# Patient Record
Sex: FEMALE | Race: BLACK OR AFRICAN AMERICAN | NOT HISPANIC OR LATINO | Employment: FULL TIME | URBAN - METROPOLITAN AREA
[De-identification: names, ages, dates, MRNs, and addresses within clinical notes are randomized per-mention and may not be internally consistent; named-entity substitution may affect disease eponyms.]

---

## 2017-01-18 ENCOUNTER — OFFICE VISIT - HEALTHEAST (OUTPATIENT)
Dept: INTERNAL MEDICINE | Facility: CLINIC | Age: 32
End: 2017-01-18

## 2017-01-18 ENCOUNTER — COMMUNICATION - HEALTHEAST (OUTPATIENT)
Dept: TELEHEALTH | Facility: CLINIC | Age: 32
End: 2017-01-18

## 2017-01-18 DIAGNOSIS — K21.9 GERD (GASTROESOPHAGEAL REFLUX DISEASE): ICD-10-CM

## 2017-01-18 DIAGNOSIS — F43.21 GRIEF REACTION: ICD-10-CM

## 2017-01-18 DIAGNOSIS — R51.9 HEADACHE: ICD-10-CM

## 2017-01-18 DIAGNOSIS — G47.00 INSOMNIA: ICD-10-CM

## 2017-02-01 ENCOUNTER — COMMUNICATION - HEALTHEAST (OUTPATIENT)
Dept: FAMILY MEDICINE | Facility: CLINIC | Age: 32
End: 2017-02-01

## 2017-02-15 ENCOUNTER — OFFICE VISIT - HEALTHEAST (OUTPATIENT)
Dept: INTERNAL MEDICINE | Facility: CLINIC | Age: 32
End: 2017-02-15

## 2017-02-15 DIAGNOSIS — F32.9 REACTIVE DEPRESSION (SITUATIONAL): ICD-10-CM

## 2017-02-15 DIAGNOSIS — F43.21 GRIEF REACTION: ICD-10-CM

## 2017-02-15 DIAGNOSIS — G44.209 TENSION HEADACHE: ICD-10-CM

## 2017-02-15 ASSESSMENT — MIFFLIN-ST. JEOR: SCORE: 1264.5

## 2017-08-12 ENCOUNTER — HEALTH MAINTENANCE LETTER (OUTPATIENT)
Age: 32
End: 2017-08-12

## 2018-03-09 ENCOUNTER — AMBULATORY - HEALTHEAST (OUTPATIENT)
Dept: MULTI SPECIALTY CLINIC | Facility: CLINIC | Age: 33
End: 2018-03-09

## 2018-03-09 ENCOUNTER — OFFICE VISIT (OUTPATIENT)
Dept: FAMILY MEDICINE | Facility: CLINIC | Age: 33
End: 2018-03-09
Payer: COMMERCIAL

## 2018-03-09 VITALS
BODY MASS INDEX: 23.39 KG/M2 | SYSTOLIC BLOOD PRESSURE: 104 MMHG | TEMPERATURE: 98 F | WEIGHT: 137 LBS | HEIGHT: 64 IN | HEART RATE: 86 BPM | DIASTOLIC BLOOD PRESSURE: 59 MMHG

## 2018-03-09 DIAGNOSIS — Z00.00 ROUTINE GENERAL MEDICAL EXAMINATION AT A HEALTH CARE FACILITY: Primary | ICD-10-CM

## 2018-03-09 DIAGNOSIS — H53.9 VISION CHANGES: ICD-10-CM

## 2018-03-09 DIAGNOSIS — Z12.4 SCREENING FOR MALIGNANT NEOPLASM OF CERVIX: ICD-10-CM

## 2018-03-09 LAB
HPV_EXT - HISTORICAL: NORMAL
PAP SMEAR - HIM PATIENT REPORTED: NORMAL

## 2018-03-09 PROCEDURE — G0145 SCR C/V CYTO,THINLAYER,RESCR: HCPCS | Performed by: FAMILY MEDICINE

## 2018-03-09 PROCEDURE — 87624 HPV HI-RISK TYP POOLED RSLT: CPT | Performed by: FAMILY MEDICINE

## 2018-03-09 PROCEDURE — 99385 PREV VISIT NEW AGE 18-39: CPT | Performed by: FAMILY MEDICINE

## 2018-03-09 ASSESSMENT — PATIENT HEALTH QUESTIONNAIRE - PHQ9
SUM OF ALL RESPONSES TO PHQ QUESTIONS 1-9: 6
10. IF YOU CHECKED OFF ANY PROBLEMS, HOW DIFFICULT HAVE THESE PROBLEMS MADE IT FOR YOU TO DO YOUR WORK, TAKE CARE OF THINGS AT HOME, OR GET ALONG WITH OTHER PEOPLE: NOT DIFFICULT AT ALL
SUM OF ALL RESPONSES TO PHQ QUESTIONS 1-9: 6

## 2018-03-09 NOTE — LETTER
[address] [id number] [phone]      March 18, 2019      Xiao Beal  [address] [facility] [address]          Dear Xiao Beal:    This is to remind you that your provider wanted you to return to the clinic for lab testing.    If you are coming in for Lipids and/or Glucose testing please fast for 10-12 hours. Morning medications can be taken with water.    You may call our office at [phone] to schedule an appointment.    Please disregard this notice if you have already had your labs drawn or made an            appointment.        Sincerely,    Nica Tse MD

## 2018-03-09 NOTE — MR AVS SNAPSHOT
After Visit Summary   3/9/2018    Xiao Beal    MRN: [id number]           Patient Information     Date Of Birth          1985        Visit Information        Provider Department      3/9/2018 11:00 AM Mariola Alvarado MD [facility]        Today's Diagnoses     Routine general medical examination at a health care facility    -  1    Screening for malignant neoplasm of cervix        Vision changes          Care Instructions      Preventive Health Recommendations  Female Ages 26 - 39  Yearly exam:   See your health care provider every year in order to    Review health changes.     Discuss preventive care.      Review your medicines if you your doctor has prescribed any.    Until age 30: Get a Pap test every three years (more often if you have had an abnormal result).    After age 30: Talk to your doctor about whether you should have a Pap test every 3 years or have a Pap test with HPV screening every 5 years.   You do not need a Pap test if your uterus was removed (hysterectomy) and you have not had cancer.  You should be tested each year for STDs (sexually transmitted diseases), if you're at risk.   Talk to your provider about how often to have your cholesterol checked.  If you are at risk for diabetes, you should have a diabetes test (fasting glucose).  Shots: Get a flu shot each year. Get a tetanus shot every 10 years.   Nutrition:     Eat at least 5 servings of fruits and vegetables each day.    Eat whole-grain bread, whole-wheat pasta and brown rice instead of white grains and rice.    Talk to your provider about Calcium and Vitamin D.     Lifestyle    Exercise at least 150 minutes a week (30 minutes a day, 5 days of the week). This will help you control your weight and prevent disease.    Limit alcohol to one drink per day.    No smoking.     Wear sunscreen to prevent skin cancer.    See your dentist every six months for an exam and cleaning.            Follow-ups  after your visit        Additional Services     OPTOMETRY REFERRAL       Your provider has referred you to: [facility]: [facility] [phone]    [email or web address]    Please be aware that coverage of these services is subject to the terms and limitations of your health insurance plan.  Call member services at your health plan with any benefit or coverage questions.      Please bring the following with you to your appointment:    (1) Any X-Rays, CTs or MRIs which have been performed.  Contact the facility where they were done to arrange for  prior to your scheduled appointment.    (2) List of current medications  (3) This referral request   (4) Any documents/labs given to you for this referral                  Future tests that were ordered for you today     Open Future Orders        Priority Expected Expires Ordered    **Glucose FUTURE anytime Routine 3/9/2018 3/9/2019 3/9/2018    Lipid panel reflex to direct LDL Fasting Routine 3/9/2018 3/9/2019 3/9/2018            Who to contact     If you have questions or need follow up information about today's clinic visit or your schedule please contact [facility] directly at [phone].  Normal or non-critical lab and imaging results will be communicated to you by MyChart, letter or phone within 4 business days after the clinic has received the results. If you do not hear from us within 7 days, please contact the clinic through MyChart or phone. If you have a critical or abnormal lab result, we will notify you by phone as soon as possible.  Submit refill requests through Lyft or call your pharmacy and they will forward the refill request to us. Please allow 3 business days for your refill to be completed.          Additional Information About Your Visit        BioserieharKontest Information     Lyft lets you send messages to your doctor, view your test results, renew your prescriptions, schedule appointments  "and more. To sign up, go to [email or web address] . Click on \"Log in\" on the left side of the screen, which will take you to the Welcome page. Then click on \"Sign up Now\" on the right side of the page.     You will be asked to enter the access code listed below, as well as some personal information. Please follow the directions to create your username and password.     Your access code is: [email or web address]  Expires: 4/15/2018 10:32 AM     Your access code will  in 90 days. If you need help or a new code, please call your [facility] clinic or [phone].        Care EveryWhere ID     This is your Care EveryWhere ID. This could be used by other organizations to access your [facility] medical records  [id number]        Your Vitals Were     Pulse Temperature Height Last Period Breastfeeding? BMI (Body Mass Index)    86 98  F (36.7  C) (Oral) 5' 4\" (1.626 m) 2018 (Approximate) No 23.52 kg/m2       Blood Pressure from Last 3 Encounters:   18 104/59   11/01/10 103/62   06 114/74    Weight from Last 3 Encounters:   18 137 lb (62.1 kg)   11/01/10 120 lb (54.4 kg)   06 131 lb 8 oz (59.6 kg)              We Performed the Following     HPV High Risk Types DNA Cervical     OPTOMETRY REFERRAL     Pap imaged thin layer screen with HPV - recommended age 30 - 65 years (select HPV order below)        Primary Care Provider Office Phone # Fax #    Richard Buchanan [phone]       [facility] [address] [place] [address]        Equal Access to Services     [facility]: Cecilia Bird, víctor peterson, cony steinberg. So [facility] [phone].    ATENCIÓN: Si habla español, tiene a david disposición servicios gratuitos de asistencia lingüística. Llame al [phone].    We comply with applicable federal civil rights laws and [place] laws. We do not discriminate on the basis of race, color, " national origin, age, disability, sex, sexual orientation, or gender identity.            Thank you!     Thank you for choosing [facility]  for your care. Our goal is always to provide you with excellent care. Hearing back from our patients is one way we can continue to improve our services. Please take a few minutes to complete the written survey that you may receive in the mail after your visit with us. Thank you!             Your Updated Medication List - Protect others around you: Learn how to safely use, store and throw away your medicines at www.disposemymeds.org.          This list is accurate as of 3/9/18 12:00 PM.  Always use your most recent med list.                   Brand Name Dispense Instructions for use Diagnosis    NO ACTIVE MEDICATIONS

## 2018-03-09 NOTE — PROGRESS NOTES
SUBJECTIVE:   CC: Xiao Beal is an 33 year old woman who presents for preventive health visit.     Physical   Annual:     Getting at least 3 servings of Calcium per day::  Yes    Bi-annual eye exam::  NO    Dental care twice a year::  Yes    Sleep apnea or symptoms of sleep apnea::  None    Frequency of exercise::  None    Taking medications regularly::  Yes    Medication side effects::  Not applicable    Additional concerns today::  No            Skin:  She has dry skin. She has seen a dermatologist in the past and she was told to take less hot showers and use lotions. She states that she still has some dry skin and it is itchy. She will scratch it to the point that she will break her skin on her legs. She uses coconut oil as a moisturizer every day, but nothing else. She will put the coconut oil on at night. The itching is worst on her legs.     Mood:  Would like to discuss emotional support -  committed suicide last year 1/1/2017. She is trying to work through this with her family. She has two children 12 and 13. She states that she is no longer feeling depressed, and she is working through this. She states that she was the one that found him, so this has been hard of her.     Additional Concerns:  She states that she had both of her children through [employer]. She has been going to [facility] for about the last 14 years, but she states that she had not been very good at follow up.     She is concerned about her vision, it seems to be changing.     She has hearing some sounds in her right ear at times. The sound will decrease, and then resolve.     Her mother has diverticulitis.     Of Note:  She is from [place] about [place] ago She was in a refugee [place].      Today's PHQ-2 Score:   PHQ-2 ( 1999 [employer]) 3/9/2018   Q1: Little interest or pleasure in doing things 0   Q2: Feeling down, depressed or hopeless 3   PHQ-2 Score 3   Q1: Little interest or  pleasure in doing things Not at all   Q2: Feeling down, depressed or hopeless Nearly every day   PHQ-2 Score 3       Abuse: Current or Past(Physical, Sexual or Emotional)- No  Do you feel safe in your environment - Yes    Social History   Substance Use Topics     Smoking status: Never Smoker     Smokeless tobacco: Never Used     Alcohol use No     No flowsheet data found.No flowsheet data found.    Reviewed orders with patient.  Reviewed health maintenance and updated orders accordingly - Yes  Patient Active Problem List   Diagnosis     actual AGE greater than recorded     Past Surgical History:   Procedure Laterality Date     C  DELIVERY ONLY  10-09-04    , Low Cervical. Transfused with 2 units       Social History   Substance Use Topics     Smoking status: Never Smoker     Smokeless tobacco: Never Used     Alcohol use No     Family History   Problem Relation Age of Onset     GASTROINTESTINAL DISEASE Mother      diverticulitis     CANCER Father      lung cancer         Current Outpatient Prescriptions   Medication Sig Dispense Refill     NO ACTIVE MEDICATIONS        No Known Allergies    Mammogram not appropriate for this patient based on age.    Pertinent mammograms are reviewed under the imaging tab.  History of abnormal Pap smear: YES - updated in Problem List and Health Maintenance accordingly    Reviewed and updated as needed this visit by clinical staff  Tobacco  Allergies  Meds  Problems  Med Hx  Surg Hx  Fam Hx  Soc Hx          Reviewed and updated as needed this visit by Provider  Allergies  Meds  Problems  Med Hx  Surg Hx  Fam Hx            Review of Systems  C: NEGATIVE for fever, chills, change in weight  I: NEGATIVE for worrisome rashes, moles or lesions  E: NEGATIVE for vision changes or irritation  ENT: NEGATIVE for ear, mouth and throat problems  R: NEGATIVE for significant cough or SOB  B: NEGATIVE for masses, tenderness or discharge  CV: NEGATIVE for chest pain,  "palpitations or peripheral edema  GI: NEGATIVE for nausea, abdominal pain, heartburn, or change in bowel habits  : NEGATIVE for unusual urinary or vaginal symptoms. Periods are regular.  M: NEGATIVE for significant arthralgias or myalgia  N: NEGATIVE for weakness, dizziness or paresthesias  P: NEGATIVE for changes in mood or affect     This document serves as a record of the services and decisions personally performed by GRISELDA MATHUR. It was created on his/her behalf by Traci Ibarra, a trained medical scribe. The creation of this document is based on the provider's statements to the medical scribe. Traci Ibarra, March 9, 2018 11:28 AM    OBJECTIVE:   /59  Pulse 86  Temp 98  F (36.7  C) (Oral)  Ht 5' 4\" (1.626 m)  Wt 137 lb (62.1 kg)  LMP 03/03/2018 (Approximate)  Breastfeeding? No  BMI 23.52 kg/m2  Physical Exam  GENERAL: healthy, alert and no distress  EYES: Eyes grossly normal to inspection, PERRL and conjunctivae and sclerae normal  HENT: ear canals and TM's normal, nose and mouth without ulcers or lesions  NECK: no adenopathy, no asymmetry, masses, or scars and thyroid normal to palpation  RESP: lungs clear to auscultation - no rales, rhonchi or wheezes  BREAST: normal without masses, tenderness or nipple discharge and no palpable axillary masses or adenopathy  CV: regular rate and rhythm, normal S1 S2, no S3 or S4, no murmur, click or rub, no peripheral edema and peripheral pulses strong  ABDOMEN: soft, nontender, no hepatosplenomegaly, no masses and bowel sounds normal   (female): normal female external genitalia, normal urethral meatus, vaginal mucosa pink, moist, well rugated, and normal cervix/adnexa/uterus without masses or discharge  MS: no gross musculoskeletal defects noted, no edema  SKIN: dry skin on legs. no suspicious lesions or rashes  NEURO: Normal strength and tone, mentation intact and speech normal  PSYCH: mentation appears normal, affect " "normal/bright    ASSESSMENT/PLAN:   (Z00.00) Routine general medical examination at a health care facility  (primary encounter diagnosis)  Comment: Healthy.  Plan: **Glucose FUTURE anytime, Lipid panel reflex to        direct LDL Fasting        Mammogram recommended, patient declined at this time. All other health maintenance updated per chart.  We discussed recommendation for mammography based on actual age.  But patient is concerned about cost and lack of insurance coverage so prefers to wait at this time.    (Z12.4) Screening for malignant neoplasm of cervix  Comment: Health maintenance.  Plan: Pap imaged thin layer screen with HPV -         recommended age 30 - 65 years (select HPV order        below), HPV High Risk Types DNA Cervical            (H53.9) Vision changes  Comment: Patient complains of some vision changes.   Plan: OPTOMETRY REFERRAL        Consult recommended.       COUNSELING:  Reviewed preventive health counseling, as reflected in patient instructions         reports that she has never smoked. She has never used smokeless tobacco.    Estimated body mass index is 23.52 kg/(m^2) as calculated from the following:    Height as of this encounter: 5' 4\" (1.626 m).    Weight as of this encounter: 137 lb (62.1 kg).       Counseling Resources:  ATP IV Guidelines  Pooled Cohorts Equation Calculator  Breast Cancer Risk Calculator  FRAX Risk Assessment  ICSI Preventive Guidelines  Dietary Guidelines for Americans, 2010  USDA's MyPlate  ASA Prophylaxis  Lung CA Screening    Mariola Alvarado MD  [facility]  Answers for HPI/ROS submitted by the patient on 3/9/2018   PHQ-2 Score: 3  If you checked off any problems, how difficult have these problems made it for you to do your work, take care of things at home, or get along with other people?: Not difficult at all  PHQ9 TOTAL SCORE: 6    The information in this document, created by the medical scribe Traci Ibarra for me, accurately " reflects the services I personally performed and the decisions made by me. I have reviewed and approved this document for accuracy prior to leaving the patient care area.

## 2018-03-09 NOTE — PATIENT INSTRUCTIONS
Preventive Health Recommendations  Female Ages 26 - 39  Yearly exam:   See your health care provider every year in order to    Review health changes.     Discuss preventive care.      Review your medicines if you your doctor has prescribed any.    Until age 30: Get a Pap test every three years (more often if you have had an abnormal result).    After age 30: Talk to your doctor about whether you should have a Pap test every 3 years or have a Pap test with HPV screening every 5 years.   You do not need a Pap test if your uterus was removed (hysterectomy) and you have not had cancer.  You should be tested each year for STDs (sexually transmitted diseases), if you're at risk.   Talk to your provider about how often to have your cholesterol checked.  If you are at risk for diabetes, you should have a diabetes test (fasting glucose).  Shots: Get a flu shot each year. Get a tetanus shot every 10 years.   Nutrition:     Eat at least 5 servings of fruits and vegetables each day.    Eat whole-grain bread, whole-wheat pasta and brown rice instead of white grains and rice.    Talk to your provider about Calcium and Vitamin D.     Lifestyle    Exercise at least 150 minutes a week (30 minutes a day, 5 days of the week). This will help you control your weight and prevent disease.    Limit alcohol to one drink per day.    No smoking.     Wear sunscreen to prevent skin cancer.    See your dentist every six months for an exam and cleaning.    [facility]   Discharged by : Teri PLATA MA    If you have any questions regarding your visit please contact your care team:     Team Gold                Clinic Hours Telephone Number     Dr. Nica Murphy NP 7am-7pm  Monday - Thursday   7am-5pm  Fridays  [phone]   (Appointment scheduling available 24/7)     RN Line  [phone] option 2     Urgent Care - [place] [place] and [place] [facility]  [place] - 11am-9pm Monday-Friday Saturday-Sunday- 9am-5pm     [place] -   5pm-9pm Monday-Friday Saturday-Sunday- 9am-5pm    [phone] - [place]    [phone] - [place]       For a Price Quote for your services, please call our Consumer Price Line at [phone].     What options do I have for visits at the clinic other than the traditional office visit?     To expand how we care for you, many of our providers are utilizing electronic visits (e-visits) and telephone visits, when medically appropriate, for interactions with their patients rather than a visit in the clinic. We also offer nurse visits for many medical concerns. Just like any other service, we will bill your insurance company for this type of visit based on time spent on the phone with your provider. Not all insurance companies cover these visits. Please check with your medical insurance if this type of visit is covered. You will be responsible for any charges that are not paid by your insurance.   E-visits via Nellix: generally incur a $35.00 fee.     Telephone visits:   Time spent on the phone: *charged based on time that is spent on the phone in increments of 10 minutes. Estimated cost:   5-10 mins $30.00   11-20 mins. $59.00   21-30 mins. $85.00     Use VenueBookt (secure email communication and access to your chart) to send your primary care provider a message or make an appointment. Ask someone on your Team how to sign up for Nellix.     As always, Thank you for trusting us with your health care needs!      [facility]:    Scheduling Appointments  Liban Alvarado [facility]  Call: [phone]    MadisonRadha thurman, [facility]  Call: [phone]    [facility]  Call: [phone]    For Gastroenterology referrals   [facility]   Clinics and Surgery Center, 4th Floor   [address] [place] [address]   Appointments: [phone]    WHERE TO GO FOR  CARE?  Clinic    Make an appointment if you:       Are sick (cold, cough, flu, sore throat, earache or in pain).       Have a small injury (sprain, small cut, burn or broken bone).       Need a physical exam, Pap smear, vaccine or prescription refill.       Have questions about your health or medicines.    To reach us:      Call [phone] ([phone]). Open 24 hours every day. (For counseling services, call [phone].)    Log into [employer] at Symptify. (Visit [email or web address] to create an account.) Hospital emergency room    An emergency is a serious or life- threatening problem that must be treated right away.    Call 830 or get to the hospital if you have:      Very bad or sudden:            - Chest pain or pressure         - Bleeding         - Head or belly pain         - Dizziness or trouble seeing, walking or                          Speaking      Problems breathing      Blood in your vomit or you are coughing up blood      A major injury (knocked out, loss of a finger or limb, rape, broken bone protruding from skin)    A mental health crisis. (Or call the Mental Health Crisis line at 1-977.119.8131 or Suicide Prevention Hotline at 1-888.650.3564.)    Open 24 hours every day. You don't need an appointment.     Urgent care    Visit urgent care for sickness or small injuries when the clinic is closed. You don't need an appointment. To check hours or find an urgent care near you, visit [email or web address]. Online care    Get online care from [facility] for more than 70 common problems, like colds, allergies and infections. Open 24 hours every day at:   [email or web address]   Need help deciding?    For advice about where to be seen, you may call your clinic and ask to speak with a nurse. We're here for you 24 hours every day.         If you are deaf or hard of hearing, please let us know. We provide many free services including sign language interpreters, oral interpreters, TTYs, telephone  amplifiers, note takers and written materials.

## 2018-03-09 NOTE — LETTER
March 19, 2018    Xiao Beal  [address] [address]    Dear Xiao,  We are happy to inform you that your PAP smear result from 3/9/18 is normal.  We are now able to do a follow up test on PAP smears. The DNA test is for HPV (Human Papilloma Virus). Cervical cancer is closely linked with certain types of HPV. Your result showed no evidence of high risk HPV.  Therefore we recommend you return in 3 years for your next pap smear.  You will still need to return to the clinic every year for an annual exam and other preventive tests.  Please contact the clinic at [phone] with any questions.  Sincerely,    Mariola Alvarado MD/[facility]

## 2018-03-10 ASSESSMENT — PATIENT HEALTH QUESTIONNAIRE - PHQ9: SUM OF ALL RESPONSES TO PHQ QUESTIONS 1-9: 6

## 2018-03-13 LAB
COPATH REPORT: NORMAL
PAP: NORMAL

## 2018-03-14 ENCOUNTER — TELEPHONE (OUTPATIENT)
Dept: FAMILY MEDICINE | Facility: CLINIC | Age: 33
End: 2018-03-14

## 2018-03-14 LAB
FINAL DIAGNOSIS: NORMAL
HPV HR 12 DNA CVX QL NAA+PROBE: NEGATIVE
HPV16 DNA SPEC QL NAA+PROBE: NEGATIVE
HPV18 DNA SPEC QL NAA+PROBE: NEGATIVE
SPECIMEN DESCRIPTION: NORMAL
SPECIMEN SOURCE CVX/VAG CYTO: NORMAL

## 2018-03-14 NOTE — TELEPHONE ENCOUNTER
Patient notified by phone with understanding voiced. She states she is also due for fasting labs, and writer sees future orders entered, so assisted with scheduling labs for 3/19/18.    Mariann Roper RN

## 2018-03-14 NOTE — TELEPHONE ENCOUNTER
Reason for Call:  Other     Detailed comments: Patient stated that she had discussed a lotion that she was to use at last office visit and unsure of the name. Please contact patient to discuss further. [facility]     Phone Number Patient can be reached at: Home number on file [phone] (home)    Best Time:     Can we leave a detailed message on this number? Not Applicable    Call taken on 3/14/2018 at 11:58 AM by Lovely Noland

## 2018-03-14 NOTE — TELEPHONE ENCOUNTER
My apologies.  We were discussing Aquaphor - a lotion she could apply daily to her skin to help relieve dryness  I had intended to put this in her after visit summary - but I see that I did not get it there.

## 2018-03-14 NOTE — TELEPHONE ENCOUNTER
Route to provider patient request for name of topical cream suggested at last visit.  Writer reviewed note and medications from visit on 3/9/18, and no mention of any topical creams.     Mariann Roper RN

## 2018-04-10 ENCOUNTER — TELEPHONE (OUTPATIENT)
Dept: FAMILY MEDICINE | Facility: CLINIC | Age: 33
End: 2018-04-10

## 2018-04-10 NOTE — TELEPHONE ENCOUNTER
Received word from  that patient's visit was billed for true age as there is a discrepancy between patient's true age and age documented.  Called and spoke with patient to let her know that if patient received a denial for their 4/9 visit to reach out to patient's insurance.  Patient said that she was going to reach out to her  to get assistance in getting age corrected.  More of an FYI to patient.

## 2018-04-16 ENCOUNTER — OFFICE VISIT (OUTPATIENT)
Dept: FAMILY MEDICINE | Facility: CLINIC | Age: 33
End: 2018-04-16
Payer: COMMERCIAL

## 2018-04-16 VITALS — SYSTOLIC BLOOD PRESSURE: 105 MMHG | TEMPERATURE: 98 F | DIASTOLIC BLOOD PRESSURE: 58 MMHG

## 2018-04-16 DIAGNOSIS — Z23 NEED FOR VACCINATION: ICD-10-CM

## 2018-04-16 DIAGNOSIS — Z71.84 TRAVEL ADVICE ENCOUNTER: Primary | ICD-10-CM

## 2018-04-16 PROCEDURE — 90632 HEPA VACCINE ADULT IM: CPT | Mod: GA | Performed by: NURSE PRACTITIONER

## 2018-04-16 PROCEDURE — 99402 PREV MED CNSL INDIV APPRX 30: CPT | Mod: 25 | Performed by: NURSE PRACTITIONER

## 2018-04-16 PROCEDURE — 90471 IMMUNIZATION ADMIN: CPT | Mod: GA | Performed by: NURSE PRACTITIONER

## 2018-04-16 PROCEDURE — 90717 YELLOW FEVER VACCINE SUBQ: CPT | Mod: GA | Performed by: NURSE PRACTITIONER

## 2018-04-16 PROCEDURE — 90691 TYPHOID VACCINE IM: CPT | Mod: GA | Performed by: NURSE PRACTITIONER

## 2018-04-16 PROCEDURE — 90472 IMMUNIZATION ADMIN EACH ADD: CPT | Mod: GA | Performed by: NURSE PRACTITIONER

## 2018-04-16 PROCEDURE — 90734 MENACWYD/MENACWYCRM VACC IM: CPT | Mod: GA | Performed by: NURSE PRACTITIONER

## 2018-04-16 RX ORDER — AZITHROMYCIN 500 MG/1
500 TABLET, FILM COATED ORAL DAILY
Qty: 3 TABLET | Refills: 0 | Status: SHIPPED | OUTPATIENT
Start: 2018-04-16 | End: 2018-04-19

## 2018-04-16 RX ORDER — ATOVAQUONE AND PROGUANIL HYDROCHLORIDE 250; 100 MG/1; MG/1
1 TABLET, FILM COATED ORAL DAILY
Qty: 20 TABLET | Refills: 0 | Status: SHIPPED | OUTPATIENT
Start: 2018-04-16 | End: 2021-10-08

## 2018-04-16 RX ORDER — ONDANSETRON 4 MG/1
4-8 TABLET, ORALLY DISINTEGRATING ORAL EVERY 8 HOURS PRN
Qty: 20 TABLET | Refills: 1 | Status: SHIPPED | OUTPATIENT
Start: 2018-04-16 | End: 2021-10-08

## 2018-04-16 NOTE — NURSING NOTE
"Chief Complaint   Patient presents with     [facility]     initial /58 (BP Location: Left arm, Cuff Size: Adult Regular)  Temp 98  F (36.7  C) (Tympanic) Estimated body mass index is 23.52 kg/(m^2) as calculated from the following:    Height as of 3/9/18: 5' 4\" (1.626 m).    Weight as of 3/9/18: 137 lb (62.1 kg).  BP completed using cuff size: regular.  L  arm      Health Maintenance that is potentially due pending provider review:  NONE    n/a    Neo Tolentino ma  "

## 2018-04-16 NOTE — PATIENT INSTRUCTIONS
Today April 16, 2018 you received the    Hepatitis A Vaccine - Please return on 10/13/18 or later for your 2nd and final dose.    Yellow Fever (YF)    Meningococcal (Menactra) Vaccine    Typhoid - injectable. This vaccine is valid for two years.   .    These appointments can be made as a NURSE ONLY visit.    **It is very important for the vaccinations to be given on the scheduled day(s), this helps ensure you receive the full effectiveness of the vaccine.**    Please call [facility] with any questions [phone]    Thank you for visiting [facility]

## 2018-04-16 NOTE — PROGRESS NOTES
Nurse Note      Itinerary:  [facility]      Departure Date: 5/12/18      Return Date: 5/30/18      Length of Trip 2 weeks      Reason for Travel: Tourism           Urban or rural: both      Accommodations: Hostel        IMMUNIZATION HISTORY  Have you received any immunizations within the past 4 weeks?  No  Have you ever fainted from having your blood drawn or from an injection?  No  Have you ever had a fever reaction to vaccination?  No  Have you ever had any bad reaction or side effect from any vaccination?  No  Have you ever had hepatitis A or B vaccine?  Yes  Do you live (or work closely) with anyone who has AIDS, an AIDS-like condition, any other immune disorder or who is on chemotherapy for cancer?  No  Do you have a family history of immunodeficiency?  No  Have you received any injection of immune globulin or any blood products during the past 12 months?  No    Patient roomed by Neo Pitts  Xiao Beal is a 33 year old female seen today with 2 daughters for counsultation for international travel to [facility] for Visiting friends and relatives.  Patient will be departing in  3 week(s) and staying for   2.5 week(s) and  traveling with family members  Patient itinerary :  will be in the urban region of [place] with additional travel to other regions which presents risk for Malaria, Yellow Fever, Dengue Fever, Chikungungya, Zika,  Trypanosomiasis, Schistosomiasis, Rabies, food borne illnesses, motor vehicle accidents, Typhoid, Leishmaniasis and Lassa Fever. exposure.      Patient's activities will include sightseeing and visiting friends and relatives.    Special medical concerns: none  Pre-travel questionnaire was completed by patient and reviewed by provider.     Vitals: /58 (BP Location: Left arm, Cuff Size: Adult Regular)  Temp 98  F (36.7  C) (Tympanic)  BMI= There is no height or weight on file to calculate BMI.    EXAM:  General:  Well-nourished, well-developed in no  acute distress.  Appears to be stated age, interacts appropriately and expresses understanding of information given to patient.    Current Outpatient Prescriptions   Medication Sig Dispense Refill     NO ACTIVE MEDICATIONS        Patient Active Problem List   Diagnosis     Abnormal glandular Papanicolaou smear of cervix     No Known Allergies      Immunizations discussed include:   Hepatitis A:  Ordered/given today, risks, benefits and side effects reviewed  Hepatitis B: Up to date  Influenza: Up to date  Typhoid: Ordered/given today, risks, benefits and side effects reviewed  Rabies: Declined  Cost  reviewed managment of a animal bite or scratch (washing wound, seek medical care within 24 hours for post exposure prophylaxis )  Yellow Fever: Stamaril Ordered/given today - consent completed, side effects, precautions, allergies, risks discussed. Patient expressed understanding.  Iranian Encephalitis: Not indicated  Meningococcus: Ordered/given today, risks, benefits and side effects reviewed  Tetanus/Diphtheria: Up to date  Measles/Mumps/Rubella: Up to date  Cholera: Not needed  Polio: Up to date  Pneumococcal: Under age of 65  Varicella: Up to date  Zostavax:  Not indicated  Shingrix: Not indicated  HPV:  Not indicated  TB:  Low risk     Stamaril Informed Consent    The patient was provided with a copy of the IRB-approved consent form and all questions were answered before the patient agreed to participate by signing the informed consent document.   A copy of the form was provided to the patient.    Date: April 16, 2018  Consent Version Date: 05/10/2017  Consent Obtained by:  Tracy Sommer CNP (Lori)     HIPAA:  Yes  HIPAA Authorization Signed Date: April 16, 2018      Inclusion/Exclusion Criteria:    (Similar to Yellow Fever-VAX)      The patient met all of the following inclusion criteria in order to be eligible for the Stamaril vaccination under this EAP (Expanded Access Investigational New Drug Program)            At increased risk for YF, including researchers, laboratory workers, vaccine production staff, and those who are traveling within 30 days to a YF-endemic region or to a country requiring proof of YF vaccination under IHRs (International Health Regulations)?       Yes     Patient is greater than or equal to 9 months of age on the day of vaccination?     Yes     Patient is greater than or equal to 18 years of age and signed and dated the Consent Forms?     Yes     Patient is < 18 years of age and parent(s)/guardian(s) signed and dated the Consent Forms?      Patient is 7 years to < 18 years of age and signed and dated the Assent form?        No Assent is required.  Patient is <7 years of age.     No      No      N/A     The patient did not meet any of the following criteria that would have excluded the patient from receiving the Stamaril vaccination under this EAP              Patient is less than 9 months of age.       No     The patient is breast-feeding and cannot stop nursing for at least 14 days after vaccination.    Note: Yellow Fever vaccine virus may be transmissible via breast milk by nursing mothers who are vaccinated during the final 2 weeks of pregnancy or post-partum.   Following transmission, infants may develop encephalitis.  The minimum time of discontinuation of breastfeeding for 14 days after vaccination is based on the expected clearance of live-attenuated vaccine virus.       No     The patient is immunosuppressed, whether congenital or idiopathic, including for example, leukemia, lymphoma, other malignancies, and patients who are receiving immunosuppressant medications (e.g. Systemic corticosteroids [greater than the standard dose of topical or inhaled steroids], alkylating drugs, antimetabolites, of other cytotoxic or immunomodulatory drugs) or radiation therapy or organ transplantation.       No     The patient has known hypersensitivity to the active substance or to any of the excipients of  Stamaril vaccine or to eggs or chicken proteins.     No     The patient is symptomatic for human immunodeficiency virus (HIV) infection     No     The patient is asymptomatic for HIV infection but accompanied by evidence of severe immune suppression    Note:  Evidence of severe immune suppression includes CD4+ T-cell counts < 200 cubic millimeters (or < 15% total lymphocytes in children aged < 6 years), or as determined by the health care provider.       No     The patient has a history of thymus dysfunction (including myasthenia gravis, thymoma, thymectomy)     No     Moderate or severe febrile illness or acute illness    Note: Participation in the EAP can be reassessed when moderate or severe febrile illness or acute illness has resolved.       No       Altitude Exposure on this trip: no  Past tolerance to Altitude: na    ASSESSMENT/PLAN:    ICD-10-CM    1. Travel advice encounter Z71.89 atovaquone-proguanil (MALARONE) 250-100 MG per tablet     C YELLOW FEVER IMMUNIZATION, LIVE, SQ (STAMARIL)     TYPHOID VACCINE, IM     HEPA VACCINE ADULT IM     MENINGOCOCCAL VACCINE,IM (MENACTRA)     azithromycin (ZITHROMAX) 500 MG tablet     ondansetron (ZOFRAN ODT) 4 MG ODT tab   2. Need for vaccination Z23 C YELLOW FEVER IMMUNIZATION, LIVE, SQ (STAMARIL)     TYPHOID VACCINE, IM     HEPA VACCINE ADULT IM     MENINGOCOCCAL VACCINE,IM (MENACTRA)     I have reviewed general recommendations for safe travel   including: food/water precautions, insect precautions, safer sex   practices given high prevalence of Zika, HIV and other STDs,   roadway safety. Educational materials and [employer] report provided.    Malaraia prophylaxis recommended: Malarone  Symptomatic treatment for traveler's diarrhea: azithromycin  Altitude illness prevention and treatment: none      Evacuation insurance advised and resources were provided to patient.    Total visit time 30 minutes  with over 50% of time spent counseling patient as detailed above.    Tracy  BARRY Sommer CNP

## 2018-04-18 ENCOUNTER — OFFICE VISIT (OUTPATIENT)
Dept: OPTOMETRY | Facility: CLINIC | Age: 33
End: 2018-04-18
Payer: COMMERCIAL

## 2018-04-18 DIAGNOSIS — H52.7 ACCOMMODATIVE DYSFUNCTION: ICD-10-CM

## 2018-04-18 DIAGNOSIS — H52.12 MYOPIA, LEFT: Primary | ICD-10-CM

## 2018-04-18 DIAGNOSIS — H04.129 DRY EYE: ICD-10-CM

## 2018-04-18 PROCEDURE — 92004 COMPRE OPH EXAM NEW PT 1/>: CPT | Performed by: OPTOMETRIST

## 2018-04-18 PROCEDURE — 92015 DETERMINE REFRACTIVE STATE: CPT | Performed by: OPTOMETRIST

## 2018-04-18 ASSESSMENT — REFRACTION_MANIFEST
OD_ADD: +1.50
OD_CYLINDER: +0.25
OD_SPHERE: +1.00
OS_ADD: +1.50
OD_SPHERE: PLANO
OD_AXIS: 163
OS_CYLINDER: SPHERE
OS_AXIS: 29
OD_SPHERE: -0.25
OD_CYLINDER: +0.25
OS_CYLINDER: +0.25
METHOD_AUTOREFRACTION: 1
OS_SPHERE: -0.75
OS_SPHERE: -0.50
OS_SPHERE: -0.50

## 2018-04-18 ASSESSMENT — VISUAL ACUITY
OS_SC+: -3
METHOD: SNELLEN - LINEAR
OD_SC: 20/200
METHOD_MR_RETINOSCOPY: 1
OS_SC: 20/20
OS_SC: 20/125
OD_SC: 20/20

## 2018-04-18 ASSESSMENT — TONOMETRY
IOP_METHOD: APPLANATION
OS_IOP_MMHG: 15
OD_IOP_MMHG: 15

## 2018-04-18 ASSESSMENT — CONF VISUAL FIELD
OD_NORMAL: 1
OS_NORMAL: 1

## 2018-04-18 ASSESSMENT — EXTERNAL EXAM - RIGHT EYE: OD_EXAM: NORMAL

## 2018-04-18 ASSESSMENT — CUP TO DISC RATIO
OS_RATIO: 0.3
OD_RATIO: 0.3

## 2018-04-18 ASSESSMENT — EXTERNAL EXAM - LEFT EYE: OS_EXAM: NORMAL

## 2018-04-18 ASSESSMENT — SLIT LAMP EXAM - LIDS: COMMENTS: NORMAL

## 2018-04-18 NOTE — PROGRESS NOTES
Chief Complaint   Patient presents with     COMPREHENSIVE EYE EXAM     1st Exam        Last Eye Exam: 1st  Dilated Previously: No, side effects of dilation explained today    What are you currently using to see?  does not use glasses or contacts       Distance Vision Acuity: Satisfied with vision    Near Vision Acuity: Not satisfied     Eye Comfort: good  Do you use eye drops? 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       OBJECTIVE: See Ophthalmology exam    ASSESSMENT:    ICD-10-CM    1. Myopia, left H52.12 REFRACTION     EYE EXAM (SIMPLE-NONBILLABLE)   2. Accommodative dysfunction H52.7 REFRACTION   3. Dry eye H04.129 EYE EXAM (SIMPLE-NONBILLABLE)      PLAN:   Prescription for reading only at this time   Warm compresses artificial tears    Change make up to below lower lash line    Jacklyn Martínez OD

## 2018-04-18 NOTE — PATIENT INSTRUCTIONS
Slightly nearsighted in L eye , mostly need reading correction   DRY EYE TREATMENT    I recommend using artificial tears for your dry eye. There are over the counter drops that work well and may be used up to 4x daily. ( systane balance, refresh optive, soothe xp)   If you need more than 4 drops daily, use a preservative free product which come in individual vials which may be used for 24 hours and discarded.     Artificial tears work best as a preventative and not as well after your eyes are starting to bother you.  It may take 4- 6 weeks of using the drops before you notice improvement.  If after that time you are still having problems schedule an appointment for an evaluation and discussion of different treatments.  Dry eyes are a chronic condition and you may have more symptoms at certain times of the year.      Additional recommended treatment:  Warm compresses once to twice daily for 5-10 minutes    Directions for warm soaks  There are few methods for hot compresses. Moisten a washcloth with hot water, or microwave for 10 seconds, being careful to not get the cloth too hot.   Then put the washcloth onto your eyelids for 5 minutes. It will cool quickly so a rice pack or eyemask that can be heated and laid on top of the washcloth will help retain the heat.          Omega 3 fatty acid supplements taken 1-2x daily

## 2018-04-18 NOTE — MR AVS SNAPSHOT
After Visit Summary   4/18/2018    Xiao Beal    MRN: [id number]           Patient Information     Date Of Birth          1985        Visit Information        Provider Department      4/18/2018 5:40 PM Jacklyn Martínez, [facility]        Today's Diagnoses     Myopia, left    -  1    Accommodative dysfunction        Dry eye          Care Instructions    Slightly nearsighted in L eye , mostly need reading correction   DRY EYE TREATMENT    I recommend using artificial tears for your dry eye. There are over the counter drops that work well and may be used up to 4x daily. ( systane balance, refresh optive, soothe xp)   If you need more than 4 drops daily, use a preservative free product which come in individual vials which may be used for 24 hours and discarded.     Artificial tears work best as a preventative and not as well after your eyes are starting to bother you.  It may take 4- 6 weeks of using the drops before you notice improvement.  If after that time you are still having problems schedule an appointment for an evaluation and discussion of different treatments.  Dry eyes are a chronic condition and you may have more symptoms at certain times of the year.      Additional recommended treatment:  Warm compresses once to twice daily for 5-10 minutes    Directions for warm soaks  There are few methods for hot compresses. Moisten a washcloth with hot water, or microwave for 10 seconds, being careful to not get the cloth too hot.   Then put the washcloth onto your eyelids for 5 minutes. It will cool quickly so a rice pack or eyemask that can be heated and laid on top of the washcloth will help retain the heat.          Omega 3 fatty acid supplements taken 1-2x daily            Follow-ups after your visit        Follow-up notes from your care team     Return in about 1 year (around 4/18/2019).      Who to contact     If you have questions or need follow up information about  "today's clinic visit or your schedule please contact [facility] directly at [phone].  Normal or non-critical lab and imaging results will be communicated to you by MyChart, letter or phone within 4 business days after the clinic has received the results. If you do not hear from us within 7 days, please contact the clinic through MyChart or phone. If you have a critical or abnormal lab result, we will notify you by phone as soon as possible.  Submit refill requests through [employer]) or call your pharmacy and they will forward the refill request to us. Please allow 3 business days for your refill to be completed.          Additional Information About Your Visit        Microtaskhart Information     [employer]) lets you send messages to your doctor, view your test results, renew your prescriptions, schedule appointments and more. To sign up, go to [email or web address]) . Click on \"Log in\" on the left side of the screen, which will take you to the Welcome page. Then click on \"Sign up Now\" on the right side of the page.     You will be asked to enter the access code listed below, as well as some personal information. Please follow the directions to create your username and password.     Your access code is: [email or web address]  Expires: 7/15/2018  3:42 PM     Your access code will  in 90 days. If you need help or a new code, please call your [facility] clinic or [phone].        Care EveryWhere ID     This is your Care EveryWhere ID. This could be used by other organizations to access your [facility] medical records  [id number]         Blood Pressure from Last 3 Encounters:   18 105/58   18 104/59   11/01/10 103/62    Weight from Last 3 Encounters:   18 62.1 kg (137 lb)   11/01/10 54.4 kg (120 lb)   06 59.6 kg (131 lb 8 oz)              We Performed the Following     EYE EXAM (SIMPLE-NONBILLABLE)     REFRACTION        Primary Care Provider Office Phone # Fax #    Mariola Davi Alvarado, " [phone]       [address] [address]        Equal Access to Services     VLADIMIR TRINIDAD : Hadii aad ku hadrenveronica Cyndeejamie, wakrisda rodriguearleneha, qaakvyata kacallumda mei, cony anthonyin hayaaastrid pattersonsusan lugo laNidhihong abdul. So [facility] [phone].    ATENCIÓN: Si habla español, tiene a david disposición servicios gratuitos de asistencia lingüística. Llame al [phone].    We comply with applicable federal civil rights laws and [place] laws. We do not discriminate on the basis of race, color, national origin, age, disability, sex, sexual orientation, or gender identity.            Thank you!     Thank you for choosing [facility]  for your care. Our goal is always to provide you with excellent care. Hearing back from our patients is one way we can continue to improve our services. Please take a few minutes to complete the written survey that you may receive in the mail after your visit with us. Thank you!             Your Updated Medication List - Protect others around you: Learn how to safely use, store and throw away your medicines at www.disposemymeds.org.          This list is accurate as of 4/18/18  6:33 PM.  Always use your most recent med list.                   Brand Name Dispense Instructions for use Diagnosis    atovaquone-proguanil 250-100 MG per tablet    MALARONE    20 tablet    Take 1 tablet by mouth daily Start 2 days before exposure to Malaria and continue daily till  7 days after exposure.    Travel advice encounter       azithromycin 500 MG tablet    ZITHROMAX    3 tablet    Take 1 tablet (500 mg) by mouth daily for 3 doses Take 1 tablet a day for up to 3 days for severe diarrhea    Travel advice encounter       NO ACTIVE MEDICATIONS           ondansetron 4 MG ODT tab    ZOFRAN ODT    20 tablet    Take 1-2 tablets (4-8 mg) by mouth every 8 hours as needed for nausea    Travel advice encounter

## 2018-04-18 NOTE — LETTER
4/18/2018         RE: Xiao Beal  [address] [address]        Dear Colleague,    Thank you for referring your patient, Xiao Beal, to the [facility]. Please see a copy of my visit note below.    Chief Complaint   Patient presents with     COMPREHENSIVE EYE EXAM     1st Exam        Last Eye Exam: 1st  Dilated Previously: No, side effects of dilation explained today    What are you currently using to see?  does not use glasses or contacts       Distance Vision Acuity: Satisfied with vision    Near Vision Acuity: Not satisfied     Eye Comfort: good  Do you use eye drops? 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       OBJECTIVE: See Ophthalmology exam    ASSESSMENT:    ICD-10-CM    1. Myopia, left H52.12 REFRACTION     EYE EXAM (SIMPLE-NONBILLABLE)   2. Accommodative dysfunction H52.7 REFRACTION   3. Dry eye H04.129 EYE EXAM (SIMPLE-NONBILLABLE)      PLAN:   Prescription for reading only at this time   Warm compresses artificial tears    Change make up to below lower lash line    Jacklyn Martínez OD     Again, thank you for allowing me to participate in the care of your patient.        Sincerely,        Jacklyn Martínez, OD

## 2019-04-30 ENCOUNTER — OFFICE VISIT - HEALTHEAST (OUTPATIENT)
Dept: FAMILY MEDICINE | Facility: CLINIC | Age: 34
End: 2019-04-30

## 2019-04-30 DIAGNOSIS — H93.8X1 SENSATION OF PLUGGED EAR ON RIGHT SIDE: ICD-10-CM

## 2019-04-30 DIAGNOSIS — Z00.00 ROUTINE GENERAL MEDICAL EXAMINATION AT A HEALTH CARE FACILITY: ICD-10-CM

## 2019-04-30 DIAGNOSIS — H93.11 TINNITUS OF RIGHT EAR: ICD-10-CM

## 2019-04-30 LAB
ALBUMIN SERPL-MCNC: 4 G/DL (ref 3.5–5)
ALP SERPL-CCNC: 48 U/L (ref 45–120)
ALT SERPL W P-5'-P-CCNC: 14 U/L (ref 0–45)
ANION GAP SERPL CALCULATED.3IONS-SCNC: 8 MMOL/L (ref 5–18)
AST SERPL W P-5'-P-CCNC: 16 U/L (ref 0–40)
BILIRUB SERPL-MCNC: 0.4 MG/DL (ref 0–1)
BUN SERPL-MCNC: 13 MG/DL (ref 8–22)
CALCIUM SERPL-MCNC: 9.5 MG/DL (ref 8.5–10.5)
CHLORIDE BLD-SCNC: 107 MMOL/L (ref 98–107)
CHOLEST SERPL-MCNC: 192 MG/DL
CO2 SERPL-SCNC: 26 MMOL/L (ref 22–31)
CREAT SERPL-MCNC: 0.71 MG/DL (ref 0.6–1.1)
FASTING STATUS PATIENT QL REPORTED: YES
GFR SERPL CREATININE-BSD FRML MDRD: >60 ML/MIN/1.73M2
GLUCOSE BLD-MCNC: 84 MG/DL (ref 70–125)
HDLC SERPL-MCNC: 57 MG/DL
LDLC SERPL CALC-MCNC: 122 MG/DL
POTASSIUM BLD-SCNC: 4.3 MMOL/L (ref 3.5–5)
PROT SERPL-MCNC: 6.8 G/DL (ref 6–8)
SODIUM SERPL-SCNC: 141 MMOL/L (ref 136–145)
TRIGL SERPL-MCNC: 63 MG/DL

## 2019-04-30 ASSESSMENT — MIFFLIN-ST. JEOR: SCORE: 1284.63

## 2019-05-01 ENCOUNTER — COMMUNICATION - HEALTHEAST (OUTPATIENT)
Dept: FAMILY MEDICINE | Facility: CLINIC | Age: 34
End: 2019-05-01

## 2019-05-23 ENCOUNTER — OFFICE VISIT - HEALTHEAST (OUTPATIENT)
Dept: FAMILY MEDICINE | Facility: CLINIC | Age: 34
End: 2019-05-23

## 2019-05-23 DIAGNOSIS — M54.50 ACUTE MIDLINE LOW BACK PAIN WITHOUT SCIATICA: ICD-10-CM

## 2019-05-23 DIAGNOSIS — M54.2 NECK PAIN: ICD-10-CM
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
## 2019-05-28 ENCOUNTER — COMMUNICATION - HEALTHEAST (OUTPATIENT)
Dept: FAMILY MEDICINE | Facility: CLINIC | Age: 34
End: 2019-05-28

## 2019-05-30 ENCOUNTER — OFFICE VISIT - HEALTHEAST (OUTPATIENT)
Dept: FAMILY MEDICINE | Facility: CLINIC | Age: 34
End: 2019-05-30
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
## 2019-05-30 DIAGNOSIS — S46.819S STRAIN OF TRAPEZIUS MUSCLE, UNSPECIFIED LATERALITY, SEQUELA: ICD-10-CM

## 2019-05-30 DIAGNOSIS — G44.309 POST-TRAUMATIC HEADACHE, NOT INTRACTABLE, UNSPECIFIED CHRONICITY PATTERN: ICD-10-CM

## 2019-05-30 ASSESSMENT — MIFFLIN-ST. JEOR: SCORE: 1289.17

## 2019-06-05 ENCOUNTER — OFFICE VISIT - HEALTHEAST (OUTPATIENT)
Dept: AUDIOLOGY | Facility: CLINIC | Age: 34
End: 2019-06-05

## 2019-06-05 ENCOUNTER — OFFICE VISIT - HEALTHEAST (OUTPATIENT)
Dept: OTOLARYNGOLOGY | Facility: CLINIC | Age: 34
End: 2019-06-05

## 2019-06-05 DIAGNOSIS — H90.3 BILATERAL SENSORINEURAL HEARING LOSS: ICD-10-CM

## 2019-06-05 DIAGNOSIS — H93.13 TINNITUS OF BOTH EARS: ICD-10-CM

## 2019-06-05 DIAGNOSIS — H93.13 TINNITUS, BILATERAL: ICD-10-CM

## 2019-06-05 DIAGNOSIS — H90.3 SENSORINEURAL HEARING LOSS, BILATERAL: ICD-10-CM

## 2019-08-29 ENCOUNTER — OFFICE VISIT - HEALTHEAST (OUTPATIENT)
Dept: FAMILY MEDICINE | Facility: CLINIC | Age: 34
End: 2019-08-29
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
## 2019-08-29 DIAGNOSIS — S16.1XXD STRAIN OF NECK MUSCLE, SUBSEQUENT ENCOUNTER: ICD-10-CM

## 2019-09-16 ENCOUNTER — OFFICE VISIT - HEALTHEAST (OUTPATIENT)
Dept: PHYSICAL THERAPY | Facility: REHABILITATION | Age: 34
End: 2019-09-16

## 2019-09-16 DIAGNOSIS — M54.2 CERVICALGIA: ICD-10-CM

## 2019-09-23 ENCOUNTER — OFFICE VISIT - HEALTHEAST (OUTPATIENT)
Dept: PHYSICAL THERAPY | Facility: REHABILITATION | Age: 34
End: 2019-09-23

## 2019-09-23 DIAGNOSIS — M54.2 CERVICALGIA: ICD-10-CM

## 2019-10-07 ENCOUNTER — OFFICE VISIT - HEALTHEAST (OUTPATIENT)
Dept: PHYSICAL THERAPY | Facility: REHABILITATION | Age: 34
End: 2019-10-07

## 2019-10-07 DIAGNOSIS — M54.2 CERVICALGIA: ICD-10-CM

## 2019-10-14 ENCOUNTER — OFFICE VISIT - HEALTHEAST (OUTPATIENT)
Dept: PHYSICAL THERAPY | Facility: REHABILITATION | Age: 34
End: 2019-10-14

## 2019-10-14 DIAGNOSIS — M54.2 CERVICALGIA: ICD-10-CM

## 2019-10-21 ENCOUNTER — OFFICE VISIT - HEALTHEAST (OUTPATIENT)
Dept: PHYSICAL THERAPY | Facility: REHABILITATION | Age: 34
End: 2019-10-21

## 2019-10-21 DIAGNOSIS — M54.2 CERVICALGIA: ICD-10-CM

## 2019-10-28 ENCOUNTER — OFFICE VISIT - HEALTHEAST (OUTPATIENT)
Dept: PHYSICAL THERAPY | Facility: REHABILITATION | Age: 34
End: 2019-10-28

## 2019-10-28 DIAGNOSIS — M54.2 CERVICALGIA: ICD-10-CM

## 2019-11-11 ENCOUNTER — OFFICE VISIT - HEALTHEAST (OUTPATIENT)
Dept: PHYSICAL THERAPY | Facility: REHABILITATION | Age: 34
End: 2019-11-11

## 2019-11-11 DIAGNOSIS — M54.2 CERVICALGIA: ICD-10-CM

## 2021-01-11 ENCOUNTER — COMMUNICATION - HEALTHEAST (OUTPATIENT)
Dept: FAMILY MEDICINE | Facility: CLINIC | Age: 36
End: 2021-01-11

## 2021-01-11 ENCOUNTER — OFFICE VISIT - HEALTHEAST (OUTPATIENT)
Dept: FAMILY MEDICINE | Facility: CLINIC | Age: 36
End: 2021-01-11

## 2021-01-11 ENCOUNTER — COMMUNICATION - HEALTHEAST (OUTPATIENT)
Dept: SCHEDULING | Facility: CLINIC | Age: 36
End: 2021-01-11

## 2021-01-11 DIAGNOSIS — N63.20 LEFT BREAST LUMP: ICD-10-CM

## 2021-01-11 DIAGNOSIS — Z00.00 ROUTINE GENERAL MEDICAL EXAMINATION AT A HEALTH CARE FACILITY: ICD-10-CM

## 2021-01-11 DIAGNOSIS — T50.Z95A: ICD-10-CM

## 2021-01-11 DIAGNOSIS — S16.1XXA STRAIN OF NECK MUSCLE, INITIAL ENCOUNTER: ICD-10-CM

## 2021-01-11 ASSESSMENT — MIFFLIN-ST. JEOR
SCORE: 1301.79
SCORE: 1301.79

## 2021-01-20 ENCOUNTER — HOSPITAL ENCOUNTER (OUTPATIENT)
Dept: MAMMOGRAPHY | Facility: CLINIC | Age: 36
Discharge: HOME | End: 2021-01-20

## 2021-01-20 ENCOUNTER — AMBULATORY - HEALTHEAST (OUTPATIENT)
Dept: SURGERY | Facility: CLINIC | Age: 36
End: 2021-01-20

## 2021-01-20 DIAGNOSIS — N63.20 LEFT BREAST LUMP: ICD-10-CM

## 2021-01-21 ENCOUNTER — HOSPITAL ENCOUNTER (OUTPATIENT)
Dept: MAMMOGRAPHY | Facility: CLINIC | Age: 36
Discharge: HOME | End: 2021-01-21

## 2021-01-21 DIAGNOSIS — N63.20 LEFT BREAST MASS: ICD-10-CM

## 2021-01-21 DIAGNOSIS — N63.10 BREAST MASS, RIGHT: ICD-10-CM

## 2021-01-21 DIAGNOSIS — N63.20 LEFT BREAST LUMP: ICD-10-CM

## 2021-01-21 DIAGNOSIS — N60.01 BREAST CYST, RIGHT: ICD-10-CM

## 2021-01-21 DIAGNOSIS — R59.9 ENLARGED LYMPH NODE: ICD-10-CM

## 2021-01-22 ENCOUNTER — COMMUNICATION - HEALTHEAST (OUTPATIENT)
Dept: FAMILY MEDICINE | Facility: CLINIC | Age: 36
End: 2021-01-22

## 2021-01-24 ENCOUNTER — COMMUNICATION - HEALTHEAST (OUTPATIENT)
Dept: FAMILY MEDICINE | Facility: CLINIC | Age: 36
End: 2021-01-24

## 2021-01-25 ENCOUNTER — COMMUNICATION - HEALTHEAST (OUTPATIENT)
Dept: ONCOLOGY | Facility: HOSPITAL | Age: 36
End: 2021-01-25

## 2021-01-26 ENCOUNTER — COMMUNICATION - HEALTHEAST (OUTPATIENT)
Dept: ADMINISTRATIVE | Facility: CLINIC | Age: 36
End: 2021-01-26

## 2021-01-27 ENCOUNTER — HOSPITAL ENCOUNTER (OUTPATIENT)
Dept: SURGERY | Facility: CLINIC | Age: 36
Discharge: HOME | End: 2021-01-27
Attending: SURGERY

## 2021-01-27 DIAGNOSIS — C50.912 INVASIVE DUCTAL CARCINOMA OF BREAST, LEFT (H): ICD-10-CM

## 2021-01-27 ASSESSMENT — MIFFLIN-ST. JEOR: SCORE: 1299.98

## 2021-01-28 ENCOUNTER — COMMUNICATION - HEALTHEAST (OUTPATIENT)
Dept: FAMILY MEDICINE | Facility: CLINIC | Age: 36
End: 2021-01-28

## 2021-01-28 ENCOUNTER — OFFICE VISIT - HEALTHEAST (OUTPATIENT)
Dept: FAMILY MEDICINE | Facility: CLINIC | Age: 36
End: 2021-01-28

## 2021-01-28 DIAGNOSIS — M79.622 PAIN OF LEFT UPPER ARM: ICD-10-CM

## 2021-01-28 DIAGNOSIS — Z17.0 MALIGNANT NEOPLASM OF UPPER-OUTER QUADRANT OF LEFT BREAST IN FEMALE, ESTROGEN RECEPTOR POSITIVE (H): ICD-10-CM

## 2021-01-28 DIAGNOSIS — C50.412 MALIGNANT NEOPLASM OF UPPER-OUTER QUADRANT OF LEFT BREAST IN FEMALE, ESTROGEN RECEPTOR POSITIVE (H): ICD-10-CM

## 2021-01-28 DIAGNOSIS — Z12.11 SPECIAL SCREENING FOR MALIGNANT NEOPLASMS, COLON: ICD-10-CM

## 2021-01-28 ASSESSMENT — MIFFLIN-ST. JEOR: SCORE: 1311.27

## 2021-02-02 ENCOUNTER — COMMUNICATION - HEALTHEAST (OUTPATIENT)
Dept: SURGERY | Facility: CLINIC | Age: 36
End: 2021-02-02

## 2021-02-02 ENCOUNTER — RECORDS - HEALTHEAST (OUTPATIENT)
Dept: ADMINISTRATIVE | Facility: OTHER | Age: 36
End: 2021-02-02

## 2021-02-04 ENCOUNTER — OFFICE VISIT - HEALTHEAST (OUTPATIENT)
Dept: ONCOLOGY | Facility: CLINIC | Age: 36
End: 2021-02-04

## 2021-02-04 DIAGNOSIS — Z71.83 ENCOUNTER FOR NONPROCREATIVE GENETIC COUNSELING: ICD-10-CM

## 2021-02-04 DIAGNOSIS — C50.912 MALIGNANT NEOPLASM OF LEFT BREAST IN FEMALE, ESTROGEN RECEPTOR POSITIVE, UNSPECIFIED SITE OF BREAST (H): ICD-10-CM

## 2021-02-04 DIAGNOSIS — Z17.0 MALIGNANT NEOPLASM OF LEFT BREAST IN FEMALE, ESTROGEN RECEPTOR POSITIVE, UNSPECIFIED SITE OF BREAST (H): ICD-10-CM

## 2021-02-05 ENCOUNTER — AMBULATORY - HEALTHEAST (OUTPATIENT)
Dept: INFUSION THERAPY | Facility: HOSPITAL | Age: 36
End: 2021-02-05

## 2021-02-05 ENCOUNTER — RECORDS - HEALTHEAST (OUTPATIENT)
Dept: ADMINISTRATIVE | Facility: OTHER | Age: 36
End: 2021-02-05

## 2021-02-05 DIAGNOSIS — Z17.0 MALIGNANT NEOPLASM OF LEFT BREAST IN FEMALE, ESTROGEN RECEPTOR POSITIVE, UNSPECIFIED SITE OF BREAST (H): ICD-10-CM

## 2021-02-05 DIAGNOSIS — C50.912 MALIGNANT NEOPLASM OF LEFT BREAST IN FEMALE, ESTROGEN RECEPTOR POSITIVE, UNSPECIFIED SITE OF BREAST (H): ICD-10-CM

## 2021-02-09 ENCOUNTER — COMMUNICATION - HEALTHEAST (OUTPATIENT)
Dept: SURGERY | Facility: CLINIC | Age: 36
End: 2021-02-09

## 2021-02-09 ENCOUNTER — HOSPITAL ENCOUNTER (OUTPATIENT)
Dept: SURGERY | Facility: CLINIC | Age: 36
Discharge: HOME | End: 2021-02-09
Attending: SPECIALIST

## 2021-02-09 DIAGNOSIS — C50.912 INVASIVE DUCTAL CARCINOMA OF BREAST, LEFT (H): ICD-10-CM

## 2021-02-10 ENCOUNTER — AMBULATORY - HEALTHEAST (OUTPATIENT)
Dept: FAMILY MEDICINE | Facility: CLINIC | Age: 36
End: 2021-02-10

## 2021-02-10 ENCOUNTER — AMBULATORY - HEALTHEAST (OUTPATIENT)
Dept: SURGERY | Facility: CLINIC | Age: 36
End: 2021-02-10

## 2021-02-10 DIAGNOSIS — Z11.59 ENCOUNTER FOR SCREENING FOR OTHER VIRAL DISEASES: ICD-10-CM

## 2021-02-16 ENCOUNTER — AMBULATORY - HEALTHEAST (OUTPATIENT)
Dept: LAB | Facility: CLINIC | Age: 36
End: 2021-02-16

## 2021-02-16 DIAGNOSIS — Z11.59 ENCOUNTER FOR SCREENING FOR OTHER VIRAL DISEASES: ICD-10-CM

## 2021-02-17 ENCOUNTER — COMMUNICATION - HEALTHEAST (OUTPATIENT)
Dept: FAMILY MEDICINE | Facility: CLINIC | Age: 36
End: 2021-02-17

## 2021-02-17 ENCOUNTER — COMMUNICATION - HEALTHEAST (OUTPATIENT)
Dept: ONCOLOGY | Facility: HOSPITAL | Age: 36
End: 2021-02-17

## 2021-02-17 LAB
SARS-COV-2 PCR COMMENT: NORMAL
SARS-COV-2 RNA SPEC QL NAA+PROBE: NEGATIVE
SARS-COV-2 VIRUS SPECIMEN SOURCE: NORMAL

## 2021-02-18 ENCOUNTER — COMMUNICATION - HEALTHEAST (OUTPATIENT)
Dept: SCHEDULING | Facility: CLINIC | Age: 36
End: 2021-02-18

## 2021-02-18 ENCOUNTER — ANESTHESIA - HEALTHEAST (OUTPATIENT)
Dept: SURGERY | Facility: AMBULATORY SURGERY CENTER | Age: 36
End: 2021-02-18

## 2021-02-18 ASSESSMENT — MIFFLIN-ST. JEOR
SCORE: 1319.78
SCORE: 1319.78

## 2021-02-19 ENCOUNTER — TRANSFERRED RECORDS (OUTPATIENT)
Dept: HEALTH INFORMATION MANAGEMENT | Facility: CLINIC | Age: 36
End: 2021-02-19

## 2021-02-19 ENCOUNTER — HOSPITAL ENCOUNTER (OUTPATIENT)
Dept: NUCLEAR MEDICINE | Facility: HOSPITAL | Age: 36
Discharge: HOME | End: 2021-02-19
Attending: SPECIALIST

## 2021-02-19 ENCOUNTER — SURGERY - HEALTHEAST (OUTPATIENT)
Dept: SURGERY | Facility: AMBULATORY SURGERY CENTER | Age: 36
End: 2021-02-19

## 2021-02-19 ENCOUNTER — HOSPITAL ENCOUNTER (OUTPATIENT)
Dept: MAMMOGRAPHY | Facility: CLINIC | Age: 36
Discharge: HOME | End: 2021-02-19
Attending: SPECIALIST

## 2021-02-19 ENCOUNTER — HOSPITAL ENCOUNTER (OUTPATIENT)
Dept: SURGERY | Facility: AMBULATORY SURGERY CENTER | Age: 36
Discharge: HOME | End: 2021-02-19
Attending: SPECIALIST | Admitting: SPECIALIST
Payer: COMMERCIAL

## 2021-02-19 DIAGNOSIS — C50.912 INVASIVE DUCTAL CARCINOMA OF BREAST, LEFT (H): ICD-10-CM

## 2021-02-19 LAB
DIPSTICK EXPIRATION DATE - HISTORICAL: NORMAL
DIPSTICK LOT NUMBER - HISTORICAL: NORMAL
POC PREG URINE (HCG) HE - HISTORICAL: NEGATIVE
POC SPECIFIC GRAVITY, URINE - HISTORICAL: NORMAL
POCT KIT EXPIRATION DATE - HISTORICAL: NORMAL
POCT KIT LOT NUMBER HE - HISTORICAL: NORMAL
POCT NEGATIVE CONTROL HE - HISTORICAL: NORMAL
POCT POSITIVE CONTROL HE - HISTORICAL: NORMAL

## 2021-02-19 ASSESSMENT — MIFFLIN-ST. JEOR
SCORE: 1321.19
SCORE: 1321.19

## 2021-02-23 ENCOUNTER — COMMUNICATION - HEALTHEAST (OUTPATIENT)
Dept: FAMILY MEDICINE | Facility: CLINIC | Age: 36
End: 2021-02-23

## 2021-02-23 ENCOUNTER — AMBULATORY - HEALTHEAST (OUTPATIENT)
Dept: SURGERY | Facility: CLINIC | Age: 36
End: 2021-02-23

## 2021-02-25 ENCOUNTER — COMMUNICATION - HEALTHEAST (OUTPATIENT)
Dept: FAMILY MEDICINE | Facility: CLINIC | Age: 36
End: 2021-02-25

## 2021-02-26 ENCOUNTER — TRANSCRIBE ORDERS (OUTPATIENT)
Dept: OTHER | Age: 36
End: 2021-02-26

## 2021-02-26 ENCOUNTER — HOSPITAL ENCOUNTER (OUTPATIENT)
Dept: SURGERY | Facility: CLINIC | Age: 36
Discharge: HOME | End: 2021-02-26
Attending: SPECIALIST

## 2021-02-26 DIAGNOSIS — C50.912 INVASIVE DUCTAL CARCINOMA OF BREAST, LEFT (H): ICD-10-CM

## 2021-02-26 DIAGNOSIS — C50.919 INVASIVE DUCTAL CARCINOMA OF BREAST (H): Primary | ICD-10-CM

## 2021-03-01 ENCOUNTER — COMMUNICATION - HEALTHEAST (OUTPATIENT)
Dept: FAMILY MEDICINE | Facility: CLINIC | Age: 36
End: 2021-03-01

## 2021-03-01 NOTE — TELEPHONE ENCOUNTER
ONCOLOGY INTAKE: Records Information      APPT INFORMATION:  Referring provider:  Desire Kent MD   Referring provider s clinic: [facility]  Reason for visit/diagnosis: Invasive ductal carcinoma of breast  Has patient been notified of appointment date and time?: Yes    RECORDS INFORMATION:  Were the records received with the referral (via [employer]fax)? No    Has patient been seen for any external appt for this diagnosis? No    If yes, where? N/a    Has patient had any imaging or procedures outside of [place]  [place] for this condition? No      If Yes, where? N/a    ADDITIONAL INFORMATION:  None

## 2021-03-02 NOTE — TELEPHONE ENCOUNTER
Action    Action Taken 3/2/21:    -[employer] [facility] - Path: [id number]    -[facility] - Path: [id number]    3/3/21:    -Imaging resolved to PACS  10:38 AM       RECORDS STATUS - BREAST    RECORDS REQUESTED FROM: [facility], [facility]   DATE REQUESTED:    NOTES DETAILS STATUS   OFFICE NOTE from referring provider [facility] Dr. Desire Kent: 2/26/21   OFFICE NOTE from medical oncologist     OFFICE NOTE from surgeon     OFFICE NOTE from radiation oncologist     DISCHARGE SUMMARY from hospital     DISCHARGE REPORT from the ER     OPERATIVE REPORT [facility] 2/19/21: Lumpectomy    2/2/21: Colonoscopy - Faxed to [facility] 3/3   MEDICATION LIST     CLINICAL TRIAL TREATMENTS TO DATE     LABS     PATHOLOGY REPORTS  (Tissue diagnosis, Stage, ER/MO percentage positive and intensity of staining, HER2 IHC, FISH, and all biopsies from breast and any distant metastasis)                 [facility], Reports in  ([facility]) Slides requested 3/2 [facility]:  2/19/21: [id number]    [facility]:  1/21/21: [id number]   GENONOMIC TESTING     TYPE:   (Next Generation Sequencing, including Foundation One testing, and Oncotype score) Requested 3/2 from [facility] 2/17/21: [facility]   IMAGING (NEED IMAGES & REPORT)     CT SCANS     MRI     MAMMO PACS 1/21/21 - 12/20/13: [facility]   ULTRASOUND PACS 1/21/21 - 12/20/13: [facility]   PET     BONE SCAN     BRAIN MRI

## 2021-03-04 ENCOUNTER — OFFICE VISIT - HEALTHEAST (OUTPATIENT)
Dept: FAMILY MEDICINE | Facility: CLINIC | Age: 36
End: 2021-03-04

## 2021-03-04 ENCOUNTER — PATIENT OUTREACH (OUTPATIENT)
Dept: ONCOLOGY | Facility: CLINIC | Age: 36
End: 2021-03-04

## 2021-03-04 DIAGNOSIS — C50.412 MALIGNANT NEOPLASM OF UPPER-OUTER QUADRANT OF LEFT BREAST IN FEMALE, ESTROGEN RECEPTOR POSITIVE (H): ICD-10-CM

## 2021-03-04 DIAGNOSIS — Z12.11 SCREEN FOR COLON CANCER: ICD-10-CM

## 2021-03-04 DIAGNOSIS — Z17.0 MALIGNANT NEOPLASM OF UPPER-OUTER QUADRANT OF LEFT BREAST IN FEMALE, ESTROGEN RECEPTOR POSITIVE (H): ICD-10-CM

## 2021-03-04 DIAGNOSIS — Z86.0100 HISTORY OF COLONIC POLYPS: ICD-10-CM

## 2021-03-04 NOTE — PROGRESS NOTES
RN CARE COORDINATION NOTE      Incoming:  Received a call from patient who is scheduled for a consult with Dr Kenny on 3/18. She is concerned about the timing of her appointment with Dr. Kenny.   She had a lumpectomy with Dr Kent on 2/19, the tumor was lager than expected with 2 positive lymph nodes. Oncotype testing was submitted on 2/24.    Discussed the role of Oncotype testing in helping to determine the benefit of chemotherapy for her individual disease. She has already been told she will need radiation due to the positive lymph nodes. Discussed the types of treatment; radiation, chemotherapy, endocrine therapy.     Advised the timing of the appointment with Dr Kenny is acceptable, we should wait to have the Oncotype results back in order for the visit to be beneficial. The Oncotype results are not expected back for another week. She will call writer when she has the results. If the results come back high, writer will discuss with Dr Kenny to determine the need to move up her appointment, if the results are low it will be ok to keep the appointment as scheduled.     She verbalized clearer understanding of the plan and feels more comfortable with the timing of her appointment. Provided contact information, she knows she can call writer anytime with additional questions or concerns.     Samreen Gaona MSN, RN, OCN  RN Care Coordinator  [facility]  [phone]

## 2021-03-05 PROCEDURE — 88321 CONSLTJ&REPRT SLD PREP ELSWR: CPT | Performed by: PATHOLOGY

## 2021-03-05 PROCEDURE — 999N001032 HC STATISTIC REVIEW OUTSIDE SLIDES TC 88321: Performed by: INTERNAL MEDICINE

## 2021-03-08 ENCOUNTER — COMMUNICATION - HEALTHEAST (OUTPATIENT)
Dept: SURGERY | Facility: CLINIC | Age: 36
End: 2021-03-08

## 2021-03-08 ENCOUNTER — AMBULATORY - HEALTHEAST (OUTPATIENT)
Dept: SURGERY | Facility: CLINIC | Age: 36
End: 2021-03-08

## 2021-03-08 PROCEDURE — 999N001032 HC STATISTIC REVIEW OUTSIDE SLIDES TC 88321: Performed by: INTERNAL MEDICINE

## 2021-03-08 PROCEDURE — 88321 CONSLTJ&REPRT SLD PREP ELSWR: CPT | Performed by: PATHOLOGY

## 2021-03-10 ENCOUNTER — PATIENT OUTREACH (OUTPATIENT)
Dept: ONCOLOGY | Facility: CLINIC | Age: 36
End: 2021-03-10

## 2021-03-10 NOTE — PROGRESS NOTES
RN CARE COORDINATION NOTE      Incoming:  Patient called with questions on her Radiation appointment 3/12.She was contacted today to schedule her appt with Dr De Paz for consult and sim. She is wondering if this is correct.   She is scheduled to meet with Dr Kenny on 3/18, her Oncotype results have not been given to her at this time.     Outgoing: Called Dr Kent's office. [phone]. to ask about Oncotype results. Left a vmm for Susan Lombardi RN.         Samreen Gaona MSN, RN, OCN  RN Care Coordinator  [facility]  [phone]

## 2021-03-10 NOTE — PROGRESS NOTES
RN CARE COORDINATION NOTE      Outgoing:  Returned call to patient to update her on the Oncotype results. They results are not back yet, they are estimated to be back on 3/12. Dr Kent's office will fax the results to us when they are in.   The radiation appointment is actually scheduled for 3/19, this is appropriate timing as we will have the Oncotype results back along with the consult with Dr Kenny on 3/18.  Left detailed vmm with details above    Samreen Gaona MSN, RN, OCN  RN Care Coordinator  [facility]  [phone]

## 2021-03-11 LAB
COPATH REPORT: NORMAL
COPATH REPORT: NORMAL

## 2021-03-12 ENCOUNTER — COMMUNICATION - HEALTHEAST (OUTPATIENT)
Dept: SURGERY | Facility: CLINIC | Age: 36
End: 2021-03-12

## 2021-03-15 ENCOUNTER — RECORDS - HEALTHEAST (OUTPATIENT)
Dept: ADMINISTRATIVE | Facility: OTHER | Age: 36
End: 2021-03-15

## 2021-03-15 ENCOUNTER — COMMUNICATION - HEALTHEAST (OUTPATIENT)
Dept: SURGERY | Facility: CLINIC | Age: 36
End: 2021-03-15

## 2021-03-15 LAB
LAB AP CHARGES (HE HISTORICAL CONVERSION): ABNORMAL
PATH REPORT.ADDENDUM SPEC: ABNORMAL
PATH REPORT.ADDENDUM SPEC: ABNORMAL
PATH REPORT.COMMENTS IMP SPEC: ABNORMAL
PATH REPORT.COMMENTS IMP SPEC: ABNORMAL
PATH REPORT.FINAL DX SPEC: ABNORMAL
PATH REPORT.GROSS SPEC: ABNORMAL
PATH REPORT.MICROSCOPIC SPEC OTHER STN: ABNORMAL
PATH REPORT.MICROSCOPIC SPEC OTHER STN: ABNORMAL
PATH REPORT.RELEVANT HX SPEC: ABNORMAL
RESULT FLAG (HE HISTORICAL CONVERSION): ABNORMAL

## 2021-03-18 ENCOUNTER — ONCOLOGY VISIT (OUTPATIENT)
Dept: ONCOLOGY | Facility: CLINIC | Age: 36
End: 2021-03-18
Attending: SPECIALIST
Payer: COMMERCIAL

## 2021-03-18 ENCOUNTER — RECORDS - HEALTHEAST (OUTPATIENT)
Dept: ADMINISTRATIVE | Facility: OTHER | Age: 36
End: 2021-03-18

## 2021-03-18 ENCOUNTER — PRE VISIT (OUTPATIENT)
Dept: ONCOLOGY | Facility: CLINIC | Age: 36
End: 2021-03-18

## 2021-03-18 VITALS
BODY MASS INDEX: 25.78 KG/M2 | WEIGHT: 145.5 LBS | OXYGEN SATURATION: 99 % | TEMPERATURE: 98.5 F | HEART RATE: 54 BPM | HEIGHT: 63 IN | RESPIRATION RATE: 18 BRPM | SYSTOLIC BLOOD PRESSURE: 115 MMHG | DIASTOLIC BLOOD PRESSURE: 75 MMHG

## 2021-03-18 DIAGNOSIS — Z17.0 MALIGNANT NEOPLASM OF OVERLAPPING SITES OF LEFT BREAST IN FEMALE, ESTROGEN RECEPTOR POSITIVE (H): ICD-10-CM

## 2021-03-18 DIAGNOSIS — Z79.811 USE OF AROMATASE INHIBITORS: Primary | ICD-10-CM

## 2021-03-18 DIAGNOSIS — C50.812 MALIGNANT NEOPLASM OF OVERLAPPING SITES OF LEFT BREAST IN FEMALE, ESTROGEN RECEPTOR POSITIVE (H): ICD-10-CM

## 2021-03-18 LAB
ALBUMIN SERPL-MCNC: 3.7 G/DL (ref 3.4–5)
ALP SERPL-CCNC: 76 U/L (ref 40–150)
ALT SERPL W P-5'-P-CCNC: 25 U/L (ref 0–50)
ANION GAP SERPL CALCULATED.3IONS-SCNC: 1 MMOL/L (ref 3–14)
AST SERPL W P-5'-P-CCNC: 11 U/L (ref 0–45)
BASOPHILS # BLD AUTO: 0 10E9/L (ref 0–0.2)
BASOPHILS NFR BLD AUTO: 0.4 %
BILIRUB SERPL-MCNC: 0.4 MG/DL (ref 0.2–1.3)
BUN SERPL-MCNC: 12 MG/DL (ref 7–30)
CALCIUM SERPL-MCNC: 9 MG/DL (ref 8.5–10.1)
CHLORIDE SERPL-SCNC: 108 MMOL/L (ref 94–109)
CO2 SERPL-SCNC: 30 MMOL/L (ref 20–32)
CREAT SERPL-MCNC: 0.66 MG/DL (ref 0.52–1.04)
DIFFERENTIAL METHOD BLD: NORMAL
EOSINOPHIL # BLD AUTO: 0.1 10E9/L (ref 0–0.7)
EOSINOPHIL NFR BLD AUTO: 1.5 %
ERYTHROCYTE [DISTWIDTH] IN BLOOD BY AUTOMATED COUNT: 12.1 % (ref 10–15)
ESTRADIOL SERPL-MCNC: <11 PG/ML
FSH SERPL-ACNC: 59.8 IU/L
GFR SERPL CREATININE-BSD FRML MDRD: >90 ML/MIN/{1.73_M2}
GLUCOSE SERPL-MCNC: 95 MG/DL (ref 70–99)
HCT VFR BLD AUTO: 38 % (ref 35–47)
HGB BLD-MCNC: 13.1 G/DL (ref 11.7–15.7)
IMM GRANULOCYTES # BLD: 0 10E9/L (ref 0–0.4)
IMM GRANULOCYTES NFR BLD: 0.2 %
LYMPHOCYTES # BLD AUTO: 1.9 10E9/L (ref 0.8–5.3)
LYMPHOCYTES NFR BLD AUTO: 34.9 %
MCH RBC QN AUTO: 32 PG (ref 26.5–33)
MCHC RBC AUTO-ENTMCNC: 34.5 G/DL (ref 31.5–36.5)
MCV RBC AUTO: 93 FL (ref 78–100)
MONOCYTES # BLD AUTO: 0.4 10E9/L (ref 0–1.3)
MONOCYTES NFR BLD AUTO: 7.2 %
NEUTROPHILS # BLD AUTO: 3 10E9/L (ref 1.6–8.3)
NEUTROPHILS NFR BLD AUTO: 55.8 %
NRBC # BLD AUTO: 0 10*3/UL
NRBC BLD AUTO-RTO: 0 /100
PLATELET # BLD AUTO: 202 10E9/L (ref 150–450)
POTASSIUM SERPL-SCNC: 3.8 MMOL/L (ref 3.4–5.3)
PROT SERPL-MCNC: 7.1 G/DL (ref 6.8–8.8)
RBC # BLD AUTO: 4.1 10E12/L (ref 3.8–5.2)
SODIUM SERPL-SCNC: 139 MMOL/L (ref 133–144)
WBC # BLD AUTO: 5.3 10E9/L (ref 4–11)

## 2021-03-18 PROCEDURE — G0463 HOSPITAL OUTPT CLINIC VISIT: HCPCS

## 2021-03-18 PROCEDURE — 83001 ASSAY OF GONADOTROPIN (FSH): CPT | Performed by: INTERNAL MEDICINE

## 2021-03-18 PROCEDURE — 36415 COLL VENOUS BLD VENIPUNCTURE: CPT

## 2021-03-18 PROCEDURE — 99205 OFFICE O/P NEW HI 60 MIN: CPT | Performed by: INTERNAL MEDICINE

## 2021-03-18 PROCEDURE — 85025 COMPLETE CBC W/AUTO DIFF WBC: CPT | Performed by: INTERNAL MEDICINE

## 2021-03-18 PROCEDURE — 82670 ASSAY OF TOTAL ESTRADIOL: CPT | Performed by: INTERNAL MEDICINE

## 2021-03-18 PROCEDURE — 80053 COMPREHEN METABOLIC PANEL: CPT | Performed by: INTERNAL MEDICINE

## 2021-03-18 ASSESSMENT — PAIN SCALES - GENERAL: PAINLEVEL: NO PAIN (0)

## 2021-03-18 ASSESSMENT — MIFFLIN-ST. JEOR: SCORE: 1314.11

## 2021-03-18 NOTE — LETTER
3/18/2021         RE: Xiao Beal  [address]  [place] [address]        Dear Colleague,    Thank you for referring your patient, Xiao Beal, to the [facility]. Please see a copy of my visit note below.    Oncology Consult:      I am seeing Ms. Beal at the request of Dr Virgie Kent regarding a new breast cancer.    HPI:  This is a 35 y.o. Female (older than her stated age likely in her 50s based on immigration and change of age), who I'm seeing for a left breast cancer. This was picked up by the patient. She noticed an indentation or dimpling of her breast several months ago. She brought it up to her physician when she was seen because of neck and arm pain after a Covid vaccine. She underwent a mammogram and ultrasound. A needle biopsy was done which shows an invasive ductal carcinoma. It is estrogen receptor positive 95%, progesterone receptor positive weakly 3% and HER-2 negative 0 by IHC.  R breast revealed two fibroadenomas.  L breast - 11x13 mm mass in the 1 oclock position 9 cm from the nipple.  Lymph nodes appeared normal.     She met with Dr Kent.  She underwent a lumpectomy with SNLB.  She is here to discuss the results and next steps with me.      Oncotype Dx score returned at 17 giving her a risk of metastatic disease of 15% at 10 years.  No benefit from chemotherapy.    She believes she is postmenopausal.  She is from [place]  She reports her actual age is likely 49.  She is having hot flashes.  LMP one year ago.  SHe is .  She has two daughters who are teenagers.  No HRT.  No prior radiation.  No prior biopsyies.  No family history of malignancy.  She feels well herself.      No f/c.  No chest pain or shortness of breath.  No n/v/d/c.     ROS: 10 point ROS neg other than the symptoms noted above in the HPI.    Past Medical History:   Diagnosis Date     Abnormal glandular Papanicolaou smear of cervix     confusing info related to pap     Breast cancer  "(H)      Previous  delivery, antepartum condition or complication 2005     Current Outpatient Medications   Medication     atovaquone-proguanil (MALARONE) 250-100 MG per tablet     letrozole (FEMARA) 2.5 MG tablet     NO ACTIVE MEDICATIONS     ondansetron (ZOFRAN ODT) 4 MG ODT tab     No current facility-administered medications for this visit.      NKDA    SH:  .  Two daughters that are teenagers.  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  She is originally from [place] but has been in the [place] since .  No tobacco or etoh use.    FH:  Unknown    PE:  /75   Pulse 54   Temp 98.5  F (36.9  C) (Tympanic)   Resp 18   Ht 1.6 m (5' 3\")   Wt 66 kg (145 lb 8 oz)   SpO2 99%   BMI 25.77 kg/m    Gen: well appearing, NAD  HEENT: no icterus  NECK: supple  CV: rrr   Lungs: clear  Abd: soft, nt, nd + bs  Ext: no edema  Breast: symmetric.  Scar in upper outer quadrant of the left breast.  No lymphadenopathy, no axillary cording    FINAL DIAGNOSIS:   CASE FROM [place], [place] ([id number],   OBTAINED 2021):   A. LEFT BREAST, 1:00, 9 CM FROM NIPPLE, ULTRASOUND-GUIDED NEEDLE CORE   BIOPSY: - INVASIVE DUCTAL CARCINOMA,   [place] grade 1, at least 9 mm in greatest linear extent.   - Focal microcalcifications associated with invasive carcinoma.   - Invasive carcinoma is positive for estrogen receptor (>95% nuclei,   strong staining), low positive for   progesterone receptor (3-5% nuclei, weak staining), and negative for   HER2/solange gene amplification by   immunohistochemistry (score 0).     B. LEFT LYMPH NODE, AXILLA, ULTRASOUND-GUIDED NEEDLE CORE BIOPSY: - Benign    lymph node tissue.C. RIGHT BREAST,   6:00, 5 CM FROM NIPPLE, MASS, ULTRASOUND-GUIDED NEEDLE CORE BIOPSY: -   Fibroadenoma, hyalinized   intracanalicular pattern.- Negative for atypia or malignancy.   D. RIGHT BREAST, 8:00, 6 CM FROM NIPPLE, ULTRASOUND-GUIDED NEEDLE CORE   BIOPSY: - Fibroadenomatous " change with   sclerosing adenosis and microcalcifications associated with benign   epithelium. - Negative for atypia or   malignancy.     I have personally reviewed all specimens and/or slides, including the   listed special stains, and used them   with my medical judgement to determine or confirm the final diagnosis.     Electronically signed out by:     Xochitl Gupta M.D., Madisyn          MICROSCOPIC AND DIAGNOSIS:  A) LEFT BREAST, ORIENTED LUMPECTOMY:       1) INVASIVE DUCTAL CARCINOMA, WITH FOCAL MICROPAPILLARY PATTERN            a) Grade: [place] grade II (of III)            b) Size:  28 x 22 x 15 mm            c) Margins: Uninvolved, 3 mm from inferior margin, 5 mm from  superior and posterior margins       2) DUCTAL CARCINOMA IN SITU: EIC Negative            a) Nuclear grade: Intermediate            b) Patterns: Cribriform and solid, with focal necrosis and  microcalcifications            c) Size: Approximately 10%            d) Margins: Uninvolved, 5 mm from the posterior margin, and at  greater distance from other margins       3) ADDITIONAL FINDINGS:  Background proliferative fibrocystic changes, with duct ectasia and  cystic changes, columnar cell hyperplasia and focal flat cell atypia,  micropapillomas, usual, florid and atypical ductal hyperplasia, and  focal atypical lobular hyperplasia  Multifocal complex sclerosing lesions  Multifocal adenosis and sclerosing adenomas (confirmed in [facility]WC/p63  immunostain in block A24) with microcalcifications            Benign skin with no Paget's disease, no involvement of in situ  or invasive carcinoma       4) Previous biopsy site present, with cavity formation and  organizing changes    B) LEFT AXILLARY LYMPH NODE, LIMITED DISSECTION:       1) TWO OF TOTAL SIX LYMPH NODES POSITIVE FOR METASTATIC CARCINOMA  (2/6),           MEASURES UP TO 9 x 5 mm ON SLIDE       2) EXTENSIVE EXTRANODAL INVOLVEMENT, MEASURES UP TO 6 mm IN  GREATEST DIMENSION    PATHOLOGIC  STAGE: pT2, pN1a, pM-Not applicable    A/P:  35 y/o female with a pT2N1a left breast cancer s/p lumpectomy and SNLB.  The tumor is ER + essentially [place] negative, her2 negative.    I had a long coversation with the patient regarding her overall disease.  We discussed she has had complete surgical excision.  She will need radiation therapy.  She would like to have this done in [facility].  Referral placed.    We discussed the role of chemotherapy.  Given her low oncotype result of 17, I do not anticipate she will get a lot of benefit from chemotherapy.  I'd like to confirm her menopausal status with labs.  From her history however I believe she is postmenopausal.  Based on the ResponderRx study published in NEJM 2020, this woman would not receive any benefit with chemotherapy given the low oncotype even with two lymph nodes involved.      I discussed with her that she will need adjuvant endocrine therapy.  If postmenopausal, I would advise an aromatase inhibitor for ten years based on the MA-17 trial.      Will obtain dexa scan.  Briefly discussed the role of prophylactic bisphosphonates.      Encouraged her to exercise.    She is recovering well.      Plan:  - bloodwork today to confirm menopausal status  - dexa scan  - refer to radiation oncology  - return in 6-8 weeks to discuss starting AI.  - Will need to confirm that she has had genetic testing/counseling.      Cristy Kenny MD

## 2021-03-18 NOTE — NURSING NOTE
Venipuncture blood draw done on patients right antecubital area. Patient tolerated well without any complications. 23 G needle used.     See flowsheets.      Radha Hare MA

## 2021-03-18 NOTE — NURSING NOTE
"      Oncology Rooming Note    March 18, 2021 12:56 PM   Xiao Beal is a 36 year old female who presents for:    Chief Complaint   Patient presents with     Consult     Abnormal glandular Papanicolaou smear of cervix      Initial Vitals: /75   Pulse 54   Temp 98.5  F (36.9  C) (Tympanic)   Resp 18   Ht 1.6 m (5' 3\")   Wt 66 kg (145 lb 8 oz)   SpO2 99%   BMI 25.77 kg/m   Estimated body mass index is 25.77 kg/m  as calculated from the following:    Height as of this encounter: 1.6 m (5' 3\").    Weight as of this encounter: 66 kg (145 lb 8 oz). Body surface area is 1.71 meters squared.  No Pain (0) Comment: Data Unavailable   No LMP recorded.  Allergies reviewed: Yes  Medications reviewed: Yes    Medications: Medication refills not needed today.  Pharmacy name entered into [employer]:    [facility] [place], [place] - [address] [place] [address] [id number] - [place], [place] - [address]  AT [facility] [address]    Clinical concerns: No new needs.     Arleth Gaona CMA                      "

## 2021-03-19 DIAGNOSIS — Z17.0 MALIGNANT NEOPLASM OF OVERLAPPING SITES OF LEFT BREAST IN FEMALE, ESTROGEN RECEPTOR POSITIVE (H): Primary | ICD-10-CM

## 2021-03-19 DIAGNOSIS — C50.812 MALIGNANT NEOPLASM OF OVERLAPPING SITES OF LEFT BREAST IN FEMALE, ESTROGEN RECEPTOR POSITIVE (H): Primary | ICD-10-CM

## 2021-03-19 RX ORDER — LETROZOLE 2.5 MG/1
2.5 TABLET, FILM COATED ORAL DAILY
Qty: 90 TABLET | Refills: 3 | Status: SHIPPED | OUTPATIENT
Start: 2021-03-19 | End: 2022-06-27

## 2021-03-22 ENCOUNTER — DOCUMENTATION ONLY (OUTPATIENT)
Dept: ONCOLOGY | Facility: CLINIC | Age: 36
End: 2021-03-22

## 2021-03-22 NOTE — PROGRESS NOTES
FMLA forms received via pt in clinic.      Forms to be completed and put in folder for provider to approve.    Fax #:  [phone]      Tiana Rodriguez CMA ([facility])

## 2021-03-23 ENCOUNTER — COMMUNICATION - HEALTHEAST (OUTPATIENT)
Dept: ADMINISTRATIVE | Facility: CLINIC | Age: 36
End: 2021-03-23

## 2021-03-23 ENCOUNTER — COMMUNICATION - HEALTHEAST (OUTPATIENT)
Dept: ONCOLOGY | Facility: HOSPITAL | Age: 36
End: 2021-03-23

## 2021-03-23 RX ORDER — LETROZOLE 2.5 MG/1
2.5 TABLET, FILM COATED ORAL DAILY
Qty: 90 TABLET | Refills: 3 | Status: SHIPPED | OUTPATIENT
Start: 2021-03-23 | End: 2021-10-08

## 2021-03-23 NOTE — PROGRESS NOTES
Oncology Consult:      I am seeing Ms. Beal at the request of Dr Virgie Kent regarding a new breast cancer.    HPI:  This is a 35 y.o. Female (older than her stated age likely in her 50s based on immigration and change of age), who I'm seeing for a left breast cancer. This was picked up by the patient. She noticed an indentation or dimpling of her breast several months ago. She brought it up to her physician when she was seen because of neck and arm pain after a Covid vaccine. She underwent a mammogram and ultrasound. A needle biopsy was done which shows an invasive ductal carcinoma. It is estrogen receptor positive 95%, progesterone receptor positive weakly 3% and HER-2 negative 0 by IHC.  R breast revealed two fibroadenomas.  L breast - 11x13 mm mass in the 1 oclock position 9 cm from the nipple.  Lymph nodes appeared normal.     She met with Dr Kent.  She underwent a lumpectomy with SNLB.  She is here to discuss the results and next steps with me.      Oncotype Dx score returned at 17 giving her a risk of metastatic disease of 15% at 10 years.  No benefit from chemotherapy.    She believes she is postmenopausal.  She is from [place]  She reports her actual age is likely 49.  She is having hot flashes.  LMP one year ago.  SHe is .  She has two daughters who are teenagers.  No HRT.  No prior radiation.  No prior biopsyies.  No family history of malignancy.  She feels well herself.      No f/c.  No chest pain or shortness of breath.  No n/v/d/c.     ROS: 10 point ROS neg other than the symptoms noted above in the HPI.    Past Medical History:   Diagnosis Date     Abnormal glandular Papanicolaou smear of cervix     confusing info related to pap     Breast cancer (H)      Previous  delivery, antepartum condition or complication 2005     Current Outpatient Medications   Medication     atovaquone-proguanil (MALARONE) 250-100 MG per tablet     letrozole (FEMARA) 2.5 MG tablet     NO ACTIVE  "MEDICATIONS     ondansetron (ZOFRAN ODT) 4 MG ODT tab     No current facility-administered medications for this visit.      NKDA    SH:  .  Two daughters that are teenagers.  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  She is originally from [place] but has [place] since [place].  No tobacco or etoh use.    FH:  Unknown    PE:  /75   Pulse 54   Temp 98.5  F (36.9  C) (Tympanic)   Resp 18   Ht 1.6 m (5' 3\")   Wt 66 kg (145 lb 8 oz)   SpO2 99%   BMI 25.77 kg/m    Gen: well appearing, NAD  HEENT: no icterus  NECK: supple  CV: rrr   Lungs: clear  Abd: soft, nt, nd + bs  Ext: no edema  Breast: symmetric.  Scar in upper outer quadrant of the left breast.  No lymphadenopathy, no axillary cording    FINAL DIAGNOSIS:   CASE FROM [facility], [place], [place] ([id number],   OBTAINED 01/21/2021):   A. LEFT BREAST, 1:00, 9 CM FROM NIPPLE, ULTRASOUND-GUIDED NEEDLE CORE   BIOPSY: - INVASIVE DUCTAL CARCINOMA,   [place] grade 1, at least 9 mm in greatest linear extent.   - Focal microcalcifications associated with invasive carcinoma.   - Invasive carcinoma is positive for estrogen receptor (>95% nuclei,   strong staining), low positive for   progesterone receptor (3-5% nuclei, weak staining), and negative for   HER2/solange gene amplification by   immunohistochemistry (score 0).     B. LEFT LYMPH NODE, AXILLA, ULTRASOUND-GUIDED NEEDLE CORE BIOPSY: - Benign    lymph node tissue.C. RIGHT BREAST,   6:00, 5 CM FROM NIPPLE, MASS, ULTRASOUND-GUIDED NEEDLE CORE BIOPSY: -   Fibroadenoma, hyalinized   intracanalicular pattern.- Negative for atypia or malignancy.   D. RIGHT BREAST, 8:00, 6 CM FROM NIPPLE, ULTRASOUND-GUIDED NEEDLE CORE   BIOPSY: - Fibroadenomatous change with   sclerosing adenosis and microcalcifications associated with benign   epithelium. - Negative for atypia or   malignancy.     I have personally reviewed all specimens and/or slides, including the   listed special stains, and used " them   with my medical judgement to determine or confirm the final diagnosis.     Electronically signed out by:     Xochitl Gupta M.D., Madisyn          MICROSCOPIC AND DIAGNOSIS:  A) LEFT BREAST, ORIENTED LUMPECTOMY:       1) INVASIVE DUCTAL CARCINOMA, WITH FOCAL MICROPAPILLARY PATTERN            a) Grade: [place] grade II (of III)            b) Size:  28 x 22 x 15 mm            c) Margins: Uninvolved, 3 mm from inferior margin, 5 mm from  superior and posterior margins       2) DUCTAL CARCINOMA IN SITU: EIC Negative            a) Nuclear grade: Intermediate            b) Patterns: Cribriform and solid, with focal necrosis and  microcalcifications            c) Size: Approximately 10%            d) Margins: Uninvolved, 5 mm from the posterior margin, and at  greater distance from other margins       3) ADDITIONAL FINDINGS:  Background proliferative fibrocystic changes, with duct ectasia and  cystic changes, columnar cell hyperplasia and focal flat cell atypia,  micropapillomas, usual, florid and atypical ductal hyperplasia, and  focal atypical lobular hyperplasia  Multifocal complex sclerosing lesions  Multifocal adenosis and sclerosing adenomas (confirmed in [facility]WC/p63  immunostain in block A24) with microcalcifications            Benign skin with no Paget's disease, no involvement of in situ  or invasive carcinoma       4) Previous biopsy site present, with cavity formation and  organizing changes    B) LEFT AXILLARY LYMPH NODE, LIMITED DISSECTION:       1) TWO OF TOTAL SIX LYMPH NODES POSITIVE FOR METASTATIC CARCINOMA  (2/6),           MEASURES UP TO 9 x 5 mm ON SLIDE       2) EXTENSIVE EXTRANODAL INVOLVEMENT, MEASURES UP TO 6 mm IN  GREATEST DIMENSION    PATHOLOGIC STAGE: pT2, pN1a, pM-Not applicable    A/P:  37 y/o female with a pT2N1a left breast cancer s/p lumpectomy and SNLB.  The tumor is ER + essentially [place] negative, her2 negative.    I had a long coversation with the patient regarding her overall  disease.  We discussed she has had complete surgical excision.  She will need radiation therapy.  She would like to have this done in [facility].  Referral placed.    We discussed the role of chemotherapy.  Given her low oncotype result of 17, I do not anticipate she will get a lot of benefit from chemotherapy.  I'd like to confirm her menopausal status with labs.  From her history however I believe she is postmenopausal.  Based on the ResponderRx study published in NEJM 2020, this woman would not receive any benefit with chemotherapy given the low oncotype even with two lymph nodes involved.      I discussed with her that she will need adjuvant endocrine therapy.  If postmenopausal, I would advise an aromatase inhibitor for ten years based on the MA-17 trial.      Will obtain dexa scan.  Briefly discussed the role of prophylactic bisphosphonates.      Encouraged her to exercise.    She is recovering well.      Plan:  - bloodwork today to confirm menopausal status  - dexa scan  - refer to radiation oncology  - return in 6-8 weeks to discuss starting AI.  - Will need to confirm that she has had genetic testing/counseling.      Cristy Kenny MD

## 2021-03-23 NOTE — PROGRESS NOTES
FMLA forms filled out and put in providers folder for review and signature.      Tiana Rodriguez CMA ([facility])

## 2021-03-23 NOTE — TELEPHONE ENCOUNTER
----- Message from Chastity Gaona RN sent at 3/23/2021 11:56 AM CDT -----  Regarding: fax URGENT  Hello,    [facility] at [phone]    Thank you  Samreen

## 2021-03-23 NOTE — PROGRESS NOTES
Referral for radiation faxed to [email or web address] @ [phone].    Tiana Rodriguez CMA ([facility])

## 2021-03-24 ENCOUNTER — COMMUNICATION - HEALTHEAST (OUTPATIENT)
Dept: ONCOLOGY | Facility: HOSPITAL | Age: 36
End: 2021-03-24

## 2021-03-25 ENCOUNTER — OFFICE VISIT - HEALTHEAST (OUTPATIENT)
Dept: RADIATION ONCOLOGY | Facility: HOSPITAL | Age: 36
End: 2021-03-25

## 2021-03-25 DIAGNOSIS — I49.9 IRREGULAR HEART BEAT: ICD-10-CM

## 2021-03-25 DIAGNOSIS — C50.912 INVASIVE DUCTAL CARCINOMA OF BREAST, LEFT (H): ICD-10-CM

## 2021-03-25 ASSESSMENT — MIFFLIN-ST. JEOR: SCORE: 1302.77

## 2021-03-25 NOTE — PROGRESS NOTES
Referral for Radiation  paperwork completed, checked for accuracy, signed and faxed to [facility] @ [phone]. A copy was made, sent to scanning and original mailed to patient at home address.    Successful transmission verified in Right Fax.      Stefanie Fernandes MA     yes

## 2021-03-26 ENCOUNTER — AMBULATORY - HEALTHEAST (OUTPATIENT)
Dept: RADIATION ONCOLOGY | Facility: HOSPITAL | Age: 36
End: 2021-03-26

## 2021-03-26 DIAGNOSIS — C50.912 INVASIVE DUCTAL CARCINOMA OF BREAST, LEFT (H): ICD-10-CM

## 2021-03-29 ENCOUNTER — COMMUNICATION - HEALTHEAST (OUTPATIENT)
Dept: ONCOLOGY | Facility: HOSPITAL | Age: 36
End: 2021-03-29

## 2021-04-01 ENCOUNTER — HOSPITAL ENCOUNTER (OUTPATIENT)
Dept: CARDIOLOGY | Facility: HOSPITAL | Age: 36
Discharge: HOME | End: 2021-04-01
Attending: RADIOLOGY

## 2021-04-01 DIAGNOSIS — I49.9 IRREGULAR HEART BEAT: ICD-10-CM

## 2021-04-01 LAB
ATRIAL RATE - MUSE: 89 BPM
DIASTOLIC BLOOD PRESSURE - MUSE: NORMAL
INTERPRETATION ECG - MUSE: NORMAL
P AXIS - MUSE: 67 DEGREES
PR INTERVAL - MUSE: 150 MS
QRS DURATION - MUSE: 86 MS
QT - MUSE: 358 MS
QTC - MUSE: 435 MS
R AXIS - MUSE: -21 DEGREES
SYSTOLIC BLOOD PRESSURE - MUSE: NORMAL
T AXIS - MUSE: 44 DEGREES
VENTRICULAR RATE- MUSE: 89 BPM

## 2021-04-05 ENCOUNTER — AMBULATORY - HEALTHEAST (OUTPATIENT)
Dept: RADIATION ONCOLOGY | Facility: HOSPITAL | Age: 36
End: 2021-04-05

## 2021-04-06 ENCOUNTER — COMMUNICATION - HEALTHEAST (OUTPATIENT)
Dept: ONCOLOGY | Facility: HOSPITAL | Age: 36
End: 2021-04-06

## 2021-04-06 ENCOUNTER — PATIENT OUTREACH (OUTPATIENT)
Dept: ONCOLOGY | Facility: CLINIC | Age: 36
End: 2021-04-06

## 2021-04-08 ENCOUNTER — COMMUNICATION - HEALTHEAST (OUTPATIENT)
Dept: RADIATION ONCOLOGY | Facility: HOSPITAL | Age: 36
End: 2021-04-08

## 2021-04-08 ENCOUNTER — ANCILLARY PROCEDURE (OUTPATIENT)
Dept: BONE DENSITY | Facility: CLINIC | Age: 36
End: 2021-04-08
Attending: INTERNAL MEDICINE
Payer: COMMERCIAL

## 2021-04-08 DIAGNOSIS — Z17.0 MALIGNANT NEOPLASM OF OVERLAPPING SITES OF LEFT BREAST IN FEMALE, ESTROGEN RECEPTOR POSITIVE (H): ICD-10-CM

## 2021-04-08 DIAGNOSIS — Z79.811 USE OF AROMATASE INHIBITORS: ICD-10-CM

## 2021-04-08 DIAGNOSIS — C50.812 MALIGNANT NEOPLASM OF OVERLAPPING SITES OF LEFT BREAST IN FEMALE, ESTROGEN RECEPTOR POSITIVE (H): ICD-10-CM

## 2021-04-08 PROCEDURE — 77080 DXA BONE DENSITY AXIAL: CPT | Performed by: INTERNAL MEDICINE

## 2021-04-09 ENCOUNTER — AMBULATORY - HEALTHEAST (OUTPATIENT)
Dept: FAMILY MEDICINE | Facility: CLINIC | Age: 36
End: 2021-04-09

## 2021-04-09 ENCOUNTER — PATIENT OUTREACH (OUTPATIENT)
Dept: ONCOLOGY | Facility: CLINIC | Age: 36
End: 2021-04-09

## 2021-04-09 ENCOUNTER — TELEPHONE (OUTPATIENT)
Dept: ONCOLOGY | Facility: CLINIC | Age: 36
End: 2021-04-09

## 2021-04-09 DIAGNOSIS — C50.912 INVASIVE DUCTAL CARCINOMA OF BREAST, LEFT (H): ICD-10-CM

## 2021-04-09 NOTE — PROGRESS NOTES
RN CARE COORDINATION NOTE    Spoke to patient who has a lot of questions regarding the need for radiation. Advised that Dr Kenny will be calling her today to discuss her questions.       Samreen Gaona MSN, RN, OCN  RN Care Coordinator  [facility]  [phone]

## 2021-04-09 NOTE — TELEPHONE ENCOUNTER
I called Xiao to discuss her concerns regarding radiation therapy.  I recommended given her lymph node involvement, I would recommend radiation.  I am in agreement with Dr Espinoza's recommendation plan to give her radiation therapy at 180 cGy each fraction to a total of 5040 cGy in 28 treatments targeted to the left breast and regional lymph nodes followed by additional 1000 cGy in 5 fractions given to the primary tumor bed using electron.       Will arrange follow up with ZANDER in 6 weeks and me in 12 weeks.    I answered questions to the best of my ability.    Cristy Kenny

## 2021-04-11 ENCOUNTER — COMMUNICATION - HEALTHEAST (OUTPATIENT)
Dept: SCHEDULING | Facility: CLINIC | Age: 36
End: 2021-04-11

## 2021-04-12 ENCOUNTER — AMBULATORY - HEALTHEAST (OUTPATIENT)
Dept: RADIATION ONCOLOGY | Facility: HOSPITAL | Age: 36
End: 2021-04-12

## 2021-04-12 ENCOUNTER — OFFICE VISIT - HEALTHEAST (OUTPATIENT)
Dept: RADIATION ONCOLOGY | Facility: HOSPITAL | Age: 36
End: 2021-04-12

## 2021-04-12 DIAGNOSIS — C50.912 INVASIVE DUCTAL CARCINOMA OF BREAST, LEFT (H): ICD-10-CM

## 2021-04-13 ENCOUNTER — AMBULATORY - HEALTHEAST (OUTPATIENT)
Dept: RADIATION ONCOLOGY | Facility: HOSPITAL | Age: 36
End: 2021-04-13

## 2021-04-14 ENCOUNTER — AMBULATORY - HEALTHEAST (OUTPATIENT)
Dept: RADIATION ONCOLOGY | Facility: HOSPITAL | Age: 36
End: 2021-04-14

## 2021-04-15 ENCOUNTER — OFFICE VISIT - HEALTHEAST (OUTPATIENT)
Dept: CARDIOLOGY | Facility: CLINIC | Age: 36
End: 2021-04-15

## 2021-04-15 ENCOUNTER — AMBULATORY - HEALTHEAST (OUTPATIENT)
Dept: RADIATION ONCOLOGY | Facility: HOSPITAL | Age: 36
End: 2021-04-15

## 2021-04-15 DIAGNOSIS — R00.2 PALPITATIONS: ICD-10-CM

## 2021-04-15 ASSESSMENT — MIFFLIN-ST. JEOR: SCORE: 1320.92

## 2021-04-16 ENCOUNTER — AMBULATORY - HEALTHEAST (OUTPATIENT)
Dept: RADIATION ONCOLOGY | Facility: HOSPITAL | Age: 36
End: 2021-04-16

## 2021-04-19 ENCOUNTER — COMMUNICATION - HEALTHEAST (OUTPATIENT)
Dept: ADMINISTRATIVE | Facility: HOSPITAL | Age: 36
End: 2021-04-19

## 2021-04-19 ENCOUNTER — AMBULATORY - HEALTHEAST (OUTPATIENT)
Dept: RADIATION ONCOLOGY | Facility: HOSPITAL | Age: 36
End: 2021-04-19

## 2021-04-19 ENCOUNTER — OFFICE VISIT - HEALTHEAST (OUTPATIENT)
Dept: RADIATION ONCOLOGY | Facility: HOSPITAL | Age: 36
End: 2021-04-19

## 2021-04-19 DIAGNOSIS — C50.912 INVASIVE DUCTAL CARCINOMA OF BREAST, LEFT (H): ICD-10-CM

## 2021-04-20 ENCOUNTER — AMBULATORY - HEALTHEAST (OUTPATIENT)
Dept: RADIATION ONCOLOGY | Facility: HOSPITAL | Age: 36
End: 2021-04-20

## 2021-04-21 ENCOUNTER — AMBULATORY - HEALTHEAST (OUTPATIENT)
Dept: RADIATION ONCOLOGY | Facility: HOSPITAL | Age: 36
End: 2021-04-21

## 2021-04-22 ENCOUNTER — AMBULATORY - HEALTHEAST (OUTPATIENT)
Dept: RADIATION ONCOLOGY | Facility: HOSPITAL | Age: 36
End: 2021-04-22

## 2021-04-23 ENCOUNTER — AMBULATORY - HEALTHEAST (OUTPATIENT)
Dept: RADIATION ONCOLOGY | Facility: HOSPITAL | Age: 36
End: 2021-04-23

## 2021-04-26 ENCOUNTER — OFFICE VISIT - HEALTHEAST (OUTPATIENT)
Dept: RADIATION ONCOLOGY | Facility: HOSPITAL | Age: 36
End: 2021-04-26

## 2021-04-26 ENCOUNTER — AMBULATORY - HEALTHEAST (OUTPATIENT)
Dept: RADIATION ONCOLOGY | Facility: HOSPITAL | Age: 36
End: 2021-04-26

## 2021-04-26 DIAGNOSIS — C50.912 INVASIVE DUCTAL CARCINOMA OF BREAST, LEFT (H): ICD-10-CM

## 2021-04-26 NOTE — PROGRESS NOTES
RN CARE COORDINATION NOTE      Spoke to patient who has several questions on radiation. She is unsure if she wants to move forward with the treatment.   She would like to speak to Dr. Kenny more to better understand her risks of forgoing radiation.     Updated Dr Kenny on patient request to call her to discuss questions.       Samreen Gaona MSN, RN, OCN  RN Care Coordinator  [facility]  [phone]

## 2021-04-27 ENCOUNTER — AMBULATORY - HEALTHEAST (OUTPATIENT)
Dept: RADIATION ONCOLOGY | Facility: HOSPITAL | Age: 36
End: 2021-04-27

## 2021-04-27 ENCOUNTER — OFFICE VISIT - HEALTHEAST (OUTPATIENT)
Dept: ONCOLOGY | Facility: CLINIC | Age: 36
End: 2021-04-27

## 2021-04-27 DIAGNOSIS — C50.912 MALIGNANT NEOPLASM OF LEFT BREAST IN FEMALE, ESTROGEN RECEPTOR POSITIVE, UNSPECIFIED SITE OF BREAST (H): ICD-10-CM

## 2021-04-27 DIAGNOSIS — Z17.0 MALIGNANT NEOPLASM OF LEFT BREAST IN FEMALE, ESTROGEN RECEPTOR POSITIVE, UNSPECIFIED SITE OF BREAST (H): ICD-10-CM

## 2021-04-28 ENCOUNTER — AMBULATORY - HEALTHEAST (OUTPATIENT)
Dept: RADIATION ONCOLOGY | Facility: HOSPITAL | Age: 36
End: 2021-04-28

## 2021-04-29 ENCOUNTER — AMBULATORY - HEALTHEAST (OUTPATIENT)
Dept: RADIATION ONCOLOGY | Facility: HOSPITAL | Age: 36
End: 2021-04-29

## 2021-05-04 ENCOUNTER — AMBULATORY - HEALTHEAST (OUTPATIENT)
Dept: RADIATION ONCOLOGY | Facility: HOSPITAL | Age: 36
End: 2021-05-04

## 2021-05-05 ENCOUNTER — AMBULATORY - HEALTHEAST (OUTPATIENT)
Dept: RADIATION ONCOLOGY | Facility: HOSPITAL | Age: 36
End: 2021-05-05

## 2021-05-05 ENCOUNTER — COMMUNICATION - HEALTHEAST (OUTPATIENT)
Dept: RADIATION ONCOLOGY | Facility: HOSPITAL | Age: 36
End: 2021-05-05

## 2021-05-06 ENCOUNTER — OFFICE VISIT - HEALTHEAST (OUTPATIENT)
Dept: RADIATION ONCOLOGY | Facility: HOSPITAL | Age: 36
End: 2021-05-06

## 2021-05-06 ENCOUNTER — AMBULATORY - HEALTHEAST (OUTPATIENT)
Dept: RADIATION ONCOLOGY | Facility: HOSPITAL | Age: 36
End: 2021-05-06

## 2021-05-06 DIAGNOSIS — C50.912 INVASIVE DUCTAL CARCINOMA OF BREAST, LEFT (H): ICD-10-CM

## 2021-05-07 ENCOUNTER — AMBULATORY - HEALTHEAST (OUTPATIENT)
Dept: RADIATION ONCOLOGY | Facility: HOSPITAL | Age: 36
End: 2021-05-07

## 2021-05-10 ENCOUNTER — OFFICE VISIT - HEALTHEAST (OUTPATIENT)
Dept: RADIATION ONCOLOGY | Facility: HOSPITAL | Age: 36
End: 2021-05-10

## 2021-05-10 ENCOUNTER — AMBULATORY - HEALTHEAST (OUTPATIENT)
Dept: RADIATION ONCOLOGY | Facility: HOSPITAL | Age: 36
End: 2021-05-10

## 2021-05-10 DIAGNOSIS — C50.912 INVASIVE DUCTAL CARCINOMA OF BREAST, LEFT (H): ICD-10-CM

## 2021-05-11 ENCOUNTER — AMBULATORY - HEALTHEAST (OUTPATIENT)
Dept: RADIATION ONCOLOGY | Facility: HOSPITAL | Age: 36
End: 2021-05-11

## 2021-05-12 ENCOUNTER — AMBULATORY - HEALTHEAST (OUTPATIENT)
Dept: RADIATION ONCOLOGY | Facility: HOSPITAL | Age: 36
End: 2021-05-12

## 2021-05-13 ENCOUNTER — AMBULATORY - HEALTHEAST (OUTPATIENT)
Dept: RADIATION ONCOLOGY | Facility: HOSPITAL | Age: 36
End: 2021-05-13

## 2021-05-14 ENCOUNTER — AMBULATORY - HEALTHEAST (OUTPATIENT)
Dept: RADIATION ONCOLOGY | Facility: HOSPITAL | Age: 36
End: 2021-05-14

## 2021-05-14 DIAGNOSIS — C50.912 INVASIVE DUCTAL CARCINOMA OF BREAST, LEFT (H): ICD-10-CM

## 2021-05-17 ENCOUNTER — OFFICE VISIT - HEALTHEAST (OUTPATIENT)
Dept: RADIATION ONCOLOGY | Facility: HOSPITAL | Age: 36
End: 2021-05-17

## 2021-05-17 ENCOUNTER — AMBULATORY - HEALTHEAST (OUTPATIENT)
Dept: RADIATION ONCOLOGY | Facility: HOSPITAL | Age: 36
End: 2021-05-17

## 2021-05-17 DIAGNOSIS — C50.912 INVASIVE DUCTAL CARCINOMA OF BREAST, LEFT (H): ICD-10-CM

## 2021-05-18 ENCOUNTER — AMBULATORY - HEALTHEAST (OUTPATIENT)
Dept: RADIATION ONCOLOGY | Facility: HOSPITAL | Age: 36
End: 2021-05-18

## 2021-05-19 ENCOUNTER — OFFICE VISIT - HEALTHEAST (OUTPATIENT)
Dept: CARDIOLOGY | Facility: CLINIC | Age: 36
End: 2021-05-19

## 2021-05-19 ENCOUNTER — AMBULATORY - HEALTHEAST (OUTPATIENT)
Dept: RADIATION ONCOLOGY | Facility: HOSPITAL | Age: 36
End: 2021-05-19

## 2021-05-19 DIAGNOSIS — R07.2 PRECORDIAL PAIN: ICD-10-CM

## 2021-05-19 ASSESSMENT — MIFFLIN-ST. JEOR: SCORE: 1320.92

## 2021-05-20 ENCOUNTER — AMBULATORY - HEALTHEAST (OUTPATIENT)
Dept: RADIATION ONCOLOGY | Facility: HOSPITAL | Age: 36
End: 2021-05-20

## 2021-05-20 NOTE — PROGRESS NOTES
Medical Oncology Follow-up    Reason for visit: left breast cancer     HPI:  This is a 36 y.o. Female (older than her stated age likely in her 50s based on immigration and change of age), with T2N1a left breast cancer, ER positive. This was picked up by the patient. She noticed an indentation or dimpling of her breast several months ago. She brought it up to her physician when she was seen because of neck and arm pain after a Covid vaccine. She underwent a mammogram and ultrasound. A needle biopsy was done which shows an invasive ductal carcinoma. It is estrogen receptor positive 95%, progesterone receptor positive weakly 3% and HER-2 negative 0 by IHC.  R breast revealed two fibroadenomas.  L breast - 11x13 mm mass in the 1 oclock position 9 cm from the nipple.  Lymph nodes appeared normal.       She met with Dr Kent.  She underwent a lumpectomy with SNLB.      Oncotype Dx score returned at 17 giving her a risk of metastatic disease of 15% at 10 years.  No benefit from chemotherapy. Dr. Kenny recommended adjuvant radiation and adjuvant endocrine therapy. Lab work confirmed postmenopausal.     Interval History: Xiao is here today with her mom. She is currently undergoing radiation treatment at [facility] and has about a week and a half left. Treatment is scheduled to finish on . She has some skin irritation and fatigue. Also notes some hot flashes. She did not start letrozole yet. Radiation oncologist said she should start following treatment.     She has many questions today about benefits/risks of aromatase inhibitors, ER positive breast cancer pathology in general, DEXA scan review.     Past Medical History:   Diagnosis Date     Abnormal glandular Papanicolaou smear of cervix     confusing info related to pap     Breast cancer (H)      Previous  delivery, antepartum condition or complication 2005     Current Outpatient Medications   Medication     atovaquone-proguanil (MALARONE) 250-100 MG per  tablet     letrozole (FEMARA) 2.5 MG tablet     letrozole (FEMARA) 2.5 MG tablet     NO ACTIVE MEDICATIONS     ondansetron (ZOFRAN ODT) 4 MG ODT tab     No current facility-administered medications for this visit.        PHYSICAL EXAM:  /65   Pulse 82   Temp 98.4  F (36.9  C) (Oral)   Resp 18   Wt 67.4 kg (148 lb 11.2 oz)   SpO2 99%   BMI 26.34 kg/m    General: Alert, oriented, pleasant, NAD  HEENT: Normocephalic, atraumatic, no icterus.   Breast: Inspection done only. Skin has mild erythema and hyperpigmentation. Some minor skin breakdown infraclavicular space       A/P:  35 y/o female with a pT2N1a left breast cancer s/p lumpectomy and SNLB.  The tumor is ER + essentially [place] negative, her2 negative.    Left breast cancer: S/p lumpectomy + SLNB and now undergoing adjuvant radiation. She is tolerating okay with expected side effects. Reviewed timeline of fatigue. Encouraged her to continue aquaphor BID and use silvadene as prescribed by rad onc. Okay to defer letrozole until she is 2 weeks post-XRT. I reviewed letrozole MOA and common side effects including loss of bone density over time, hot flashes, vaginal dryness, joint stiffness. Reviewed her oncotype DX and recurrence risk. Dr. Kenny has recommended a 10 year course of AI.     Bone health: DEXA scan was reviewed showing osteopenia. Discussed calcium and vitamin D and weight bearing exercise. Dr. Kenny may discuss bisphosphonates more at next visit.       Follow-up as planned in early July. Discussed follow-up after this will be every 3 months.     80 minutes spent on the date of the encounter doing chart review, review of test results, interpretation of tests, patient visit and documentation     Raquel Alarcon PA-C

## 2021-05-21 ENCOUNTER — ONCOLOGY VISIT (OUTPATIENT)
Dept: ONCOLOGY | Facility: CLINIC | Age: 36
End: 2021-05-21
Attending: INTERNAL MEDICINE
Payer: COMMERCIAL

## 2021-05-21 ENCOUNTER — AMBULATORY - HEALTHEAST (OUTPATIENT)
Dept: RADIATION ONCOLOGY | Facility: HOSPITAL | Age: 36
End: 2021-05-21

## 2021-05-21 VITALS
TEMPERATURE: 98.4 F | SYSTOLIC BLOOD PRESSURE: 104 MMHG | RESPIRATION RATE: 18 BRPM | HEART RATE: 82 BPM | OXYGEN SATURATION: 99 % | DIASTOLIC BLOOD PRESSURE: 65 MMHG | WEIGHT: 148.7 LBS | BODY MASS INDEX: 26.34 KG/M2

## 2021-05-21 DIAGNOSIS — C50.812 MALIGNANT NEOPLASM OF OVERLAPPING SITES OF LEFT BREAST IN FEMALE, ESTROGEN RECEPTOR POSITIVE (H): Primary | ICD-10-CM

## 2021-05-21 DIAGNOSIS — Z17.0 MALIGNANT NEOPLASM OF OVERLAPPING SITES OF LEFT BREAST IN FEMALE, ESTROGEN RECEPTOR POSITIVE (H): Primary | ICD-10-CM

## 2021-05-21 PROCEDURE — 99214 OFFICE O/P EST MOD 30 MIN: CPT | Performed by: PHYSICIAN ASSISTANT

## 2021-05-21 PROCEDURE — G0463 HOSPITAL OUTPT CLINIC VISIT: HCPCS

## 2021-05-21 ASSESSMENT — PAIN SCALES - GENERAL: PAINLEVEL: NO PAIN (0)

## 2021-05-21 NOTE — LETTER
2021         RE: Xiao Beal  [address]  [place] [address]      Medical Oncology Follow-up    Reason for visit: left breast cancer     HPI:  This is a 36 y.o. Female (older than her stated age likely in her 50s based on immigration and change of age), with T2N1a left breast cancer, ER positive. This was picked up by the patient. She noticed an indentation or dimpling of her breast several months ago. She brought it up to her physician when she was seen because of neck and arm pain after a Covid vaccine. She underwent a mammogram and ultrasound. A needle biopsy was done which shows an invasive ductal carcinoma. It is estrogen receptor positive 95%, progesterone receptor positive weakly 3% and HER-2 negative 0 by IHC.  R breast revealed two fibroadenomas.  L breast - 11x13 mm mass in the 1 oclock position 9 cm from the nipple.  Lymph nodes appeared normal.       She met with Dr Kent.  She underwent a lumpectomy with SNLB.      Oncotype Dx score returned at 17 giving her a risk of metastatic disease of 15% at 10 years.  No benefit from chemotherapy. Dr. Kenny recommended adjuvant radiation and adjuvant endocrine therapy. Lab work confirmed postmenopausal.     Interval History: Xiao is here today with her mom. She is currently undergoing radiation treatment at [facility] and has about a week and a half left. Treatment is scheduled to finish on . She has some skin irritation and fatigue. Also notes some hot flashes. She did not start letrozole yet. Radiation oncologist said she should start following treatment.     She has many questions today about benefits/risks of aromatase inhibitors, ER positive breast cancer pathology in general, DEXA scan review.     Past Medical History:   Diagnosis Date     Abnormal glandular Papanicolaou smear of cervix     confusing info related to pap     Breast cancer (H)      Previous  delivery, antepartum condition or complication 2005     Current  Outpatient Medications   Medication     atovaquone-proguanil (MALARONE) 250-100 MG per tablet     letrozole (FEMARA) 2.5 MG tablet     letrozole (FEMARA) 2.5 MG tablet     NO ACTIVE MEDICATIONS     ondansetron (ZOFRAN ODT) 4 MG ODT tab     No current facility-administered medications for this visit.        PHYSICAL EXAM:  /65   Pulse 82   Temp 98.4  F (36.9  C) (Oral)   Resp 18   Wt 67.4 kg (148 lb 11.2 oz)   SpO2 99%   BMI 26.34 kg/m    General: Alert, oriented, pleasant, NAD  HEENT: Normocephalic, atraumatic, no icterus.   Breast: Inspection done only. Skin has mild erythema and hyperpigmentation. Some minor skin breakdown infraclavicular space       A/P:  35 y/o female with a pT2N1a left breast cancer s/p lumpectomy and SNLB.  The tumor is ER + essentially AK negative, her2 negative.    Left breast cancer: S/p lumpectomy + SLNB and now undergoing adjuvant radiation. She is tolerating okay with expected side effects. Reviewed timeline of fatigue. Encouraged her to continue aquaphor BID and use silvadene as prescribed by rad onc. Okay to defer letrozole until she is 2 weeks post-XRT. I reviewed letrozole MOA and common side effects including loss of bone density over time, hot flashes, vaginal dryness, joint stiffness. Reviewed her oncotype DX and recurrence risk. Dr. Kenny has recommended a 10 year course of AI.     Bone health: DEXA scan was reviewed showing osteopenia. Discussed calcium and vitamin D and weight bearing exercise. Dr. Kenny may discuss bisphosphonates more at next visit.       Follow-up as planned in early July. Discussed follow-up after this will be every 3 months.     80 minutes spent on the date of the encounter doing chart review, review of test results, interpretation of tests, patient visit and documentation     CAYETANO Fleming PA-C

## 2021-05-21 NOTE — NURSING NOTE
"Oncology Rooming Note    May 21, 2021 12:09 PM   Xiao Beal is a 36 year old female who presents for:    Chief Complaint   Patient presents with     Oncology Clinic Visit     INVASIVE DUCTAL CARCINOMA OF BREAST     Initial Vitals: /65   Pulse 82   Temp 98.4  F (36.9  C) (Oral)   Resp 18   Wt 67.4 kg (148 lb 11.2 oz)   SpO2 99%   BMI 26.34 kg/m   Estimated body mass index is 26.34 kg/m  as calculated from the following:    Height as of 3/18/21: 1.6 m (5' 3\").    Weight as of this encounter: 67.4 kg (148 lb 11.2 oz). Body surface area is 1.73 meters squared.  No Pain (0) Comment: Data Unavailable   No LMP recorded.  Allergies reviewed: Yes  Medications reviewed: Yes    Medications: Medication refills not needed today.  Pharmacy name entered into EPIC:    [facility] [place], [place] - [address] [id number] [place], [place] - [address] [facility]  [address] [facility] [address]  [id number] - [place], [place] - [address] AT [facility] [address]    Clinical concerns: None.       Radha Hare MA            "

## 2021-05-24 ENCOUNTER — OFFICE VISIT - HEALTHEAST (OUTPATIENT)
Dept: RADIATION ONCOLOGY | Facility: HOSPITAL | Age: 36
End: 2021-05-24

## 2021-05-24 ENCOUNTER — AMBULATORY - HEALTHEAST (OUTPATIENT)
Dept: RADIATION ONCOLOGY | Facility: HOSPITAL | Age: 36
End: 2021-05-24

## 2021-05-24 DIAGNOSIS — C50.912 INVASIVE DUCTAL CARCINOMA OF BREAST, LEFT (H): ICD-10-CM

## 2021-05-25 ENCOUNTER — AMBULATORY - HEALTHEAST (OUTPATIENT)
Dept: RADIATION ONCOLOGY | Facility: HOSPITAL | Age: 36
End: 2021-05-25

## 2021-05-26 ENCOUNTER — AMBULATORY - HEALTHEAST (OUTPATIENT)
Dept: RADIATION ONCOLOGY | Facility: HOSPITAL | Age: 36
End: 2021-05-26

## 2021-05-27 ENCOUNTER — AMBULATORY - HEALTHEAST (OUTPATIENT)
Dept: RADIATION ONCOLOGY | Facility: HOSPITAL | Age: 36
End: 2021-05-27

## 2021-05-27 VITALS
SYSTOLIC BLOOD PRESSURE: 101 MMHG | HEART RATE: 92 BPM | HEIGHT: 63 IN | RESPIRATION RATE: 14 BRPM | BODY MASS INDEX: 26.05 KG/M2 | WEIGHT: 147 LBS | DIASTOLIC BLOOD PRESSURE: 66 MMHG

## 2021-05-27 VITALS — BODY MASS INDEX: 25.86 KG/M2 | WEIGHT: 146 LBS

## 2021-05-27 VITALS — WEIGHT: 146.5 LBS | BODY MASS INDEX: 25.95 KG/M2

## 2021-05-28 NOTE — PROGRESS NOTES
Assessment:     1. Routine general medical examination at a health care facility  Comprehensive Metabolic Panel    Lipid Cascade FASTING    cholecalciferol, vitamin D3, (VITAMIN D3) 2,000 unit Tab   2. Sensation of plugged ear on right side  Hearing Screening   3. Tinnitus of right ear  Ambulatory referral to ENT        Healthy female exam.         Plan:       All questions answered.  Diagnosis explained in detail, including differential.  Discussed healthy lifestyle modifications.  Educational material distributed.     Subjective:     Chief Complaint   Patient presents with     Annual Exam     Fasting, pap smear today also.  RIght ear is bothersome        Xiao Beal is a 34 y.o. female who presents for an annual exam. The patient is not currently sexually active. The patient participates in regular exercise: no. The patient reports that there is not domestic violence in her life.     Recently started having periods that are getting lighter  She informs me that her real age is 44.  This has been documented in her previous clinic visits.  However, she declines any further preventative testing in relation to age as she has been billed higher.  This was regarding mammogram.  She informs me that she has had mammogram in the past and for the procedure done and ended up paying her bills.  Having said that, she informs me that she will pursue testing if she feels there is a need.    She feels that her right ear is plugged and she is having ringing in the right ear.      Healthy Habits:   Regular Exercise: No  Sunscreen Use: Yes  Healthy Diet: Yes  Dental Visits Regularly: Yes  Seat Belt: Yes  Sexually active: No  Self Breast Exam Monthly:Yes and No  Hemoccults: N/A  Flex Sig: N/A  Colonoscopy: N/A  Lipid Profile: Yes  Glucose Screen: Yes  Prevention of Osteoporosis: N/A  Last Dexa: N/A  Guns at Home:  No      Immunization History   Administered Date(s) Administered     Hep B, historic 07/11/2001, 04/04/2002, 06/02/2006      IPV 2002     Influenza, inj, historic,unspecified 10/21/2005, 10/15/2010, 10/04/2013     MMR 2001     Td,adult,historic,unspecified 2001, 2002     Tdap 2013     Varicella 2001, 2002     Immunization status: up to date and documented.     Hearing Screening    125Hz 250Hz 500Hz 1000Hz 2000Hz 3000Hz 4000Hz 6000Hz 8000Hz   Right ear:   25 25 30 30 30     Left ear:   25 25 30 30 30         Gynecologic History  No LMP recorded.   Last week  Contraception: none  Last Pap: 2018. Results were: normal        OB History   No data available       Current Outpatient Medications   Medication Sig Dispense Refill     cholecalciferol, vitamin D3, (VITAMIN D3) 2,000 unit Tab Take 1 tablet (2,000 Units total) by mouth daily. 100 tablet 3     ibuprofen (ADVIL,MOTRIN) 200 MG tablet Take 2 tablets (400 mg total) by mouth every 6 (six) hours as needed for pain. 30 tablet 0     omeprazole (PRILOSEC) 20 MG capsule Take 1 capsule (20 mg total) by mouth Daily before breakfast. 30 capsule 2     zolpidem (AMBIEN) 5 MG tablet Take 1 tablet (5 mg total) by mouth bedtime as needed for sleep. 30 tablet 0     No current facility-administered medications for this visit.      History reviewed. No pertinent past medical history.  Past Surgical History:   Procedure Laterality Date     PA  DELIVERY ONLY      Description:  Section;  Recorded: 2012;     Patient has no known allergies.  Family History   Problem Relation Age of Onset     Diverticulitis Mother      Lung cancer Father      Social History     Socioeconomic History     Marital status:      Spouse name: Not on file     Number of children: Not on file     Years of education: Not on file     Highest education level: Not on file   Occupational History     Not on file   Social Needs     Financial resource strain: Not on file     Food insecurity:     Worry: Not on file     Inability: Not on file     Transportation needs:      "Medical: Not on file     Non-medical: Not on file   Tobacco Use     Smoking status: Never Smoker     Smokeless tobacco: Never Used   Substance and Sexual Activity     Alcohol use: No     Drug use: No     Sexual activity: Not Currently     Partners: Male   Lifestyle     Physical activity:     Days per week: Not on file     Minutes per session: Not on file     Stress: Not on file   Relationships     Social connections:     Talks on phone: Not on file     Gets together: Not on file     Attends Yazidi service: Not on file     Active member of club or organization: Not on file     Attends meetings of clubs or organizations: Not on file     Relationship status: Not on file     Intimate partner violence:     Fear of current or ex partner: Not on file     Emotionally abused: Not on file     Physically abused: Not on file     Forced sexual activity: Not on file   Other Topics Concern     Not on file   Social History Narrative    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  She is a  and has 2 teenaged children.  Her  committed suicide in 2016.  She does have brothers and sisters and her mom in the area.        Reviewed above.    Patient is with her 2 daughters.  She feels that she is in stable phase of life.    Emmanuel Peterson MD  5/1/2019           Review of Systems  General:  Denies problem  Eyes: Denies problem  Ears/Nose/Throat: Denies problem  Cardiovascular: Denies problem  Respiratory:  Denies problem  Gastrointestinal:  Denies problem, Genitourinary: Denies problem  Musculoskeletal:  Denies problem  Skin: Denies problem  Neurologic: Denies problem  Psychiatric: Denies problem  Endocrine: Denies problem  Heme/Lymphatic: Denies problem   Allergic/Immunologic: Denies problem        Objective:         Vitals:    04/30/19 0826   BP: 108/62   Pulse: 82   SpO2: 99%   Weight: 139 lb (63 kg)   Height: 5' 3\" (1.6 m)     Body mass index is 24.62 kg/m .    Physical Exam:  General Appearance: Alert, " cooperative, no distress, appears stated age  Head: Normocephalic, without obvious abnormality, atraumatic  Eyes: PERRL, conjunctiva/corneas clear, EOM's intact  Ears: Normal TM's and external ear canals, both ears  Nose: Nares normal, septum midline,mucosa normal, no drainage  Throat: Lips, mucosa, and tongue normal; teeth and gums normal  Neck: Supple, symmetrical, trachea midline, no adenopathy;  thyroid: not enlarged, symmetric, no tenderness/mass/nodules; no carotid bruit or JVD  Back: Symmetric, no curvature, ROM normal, no CVA tenderness  Lungs: Clear to auscultation bilaterally, respirations unlabored  Breasts: No breast masses, tenderness, asymmetry, or nipple discharge.  Heart: Regular rate and rhythm, S1 and S2 normal, no murmur, rub, or gallop,   Abdomen: Soft, non-tender, bowel sounds active all four quadrants,  no masses, no organomegaly  Pelvic:Not examined  Extremities: Extremities normal, atraumatic, no cyanosis or edema  Skin: Skin color, texture, turgor normal, no rashes or lesions  Lymph nodes: Cervical, supraclavicular, and axillary nodes normal  Neurologic: Normal

## 2021-05-29 NOTE — TELEPHONE ENCOUNTER
EMELY for Pt at 3:12 pm letting her know appointment is scheduled to see Dr Peterson on 5/30/19 at 9:30 am  TAVARES Tesfaye
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
## 2021-05-29 NOTE — PROGRESS NOTES
"HISTORY OF PRESENT ILLNESS  Asked to see by Dr. Peterson for evaluation ears. Patient reports that her ear bothers her sometimes, not all of the time. It is worse when she gets stressed or tired. She has a history of tinnitus. She has what she describes as a \"flirring sound.\" She has had symptoms for almost 2-3 years. Recently her kids have complained that her hearing is decreased.     REVIEW OF SYSTEMS  Review of Systems: a 10-system review was performed. Pertinent positives are noted in the HPI and on a separate scanned document in the chart.    PMH, PSH, FH and SH has documented in the EHR.      EXAM    CONSTITUTIONAL  General Appearance:  Normal, well developed, well nourished, no obvious distress  Ability to Communicate:  communicates appropriately.    HEAD AND FACE  Appearance and Symmetry:  Normal, no scalp or facial scarring or suspicious lesions.  Paranasal sinuses tenderness:  Normal, Paranasal sinuses non tender    EARS  Clinical speech reception threshold:  Normal, able to hear normal speech.  Auricle:  Normal, Auricles without scars, lesions, masses.  External auditory canal:  Normal, External auditory canal normal.  Tympanic membrane:  Normal, Tympanic membranes normal without swelling or erythema.  Tympanic membrane mobility:  Normal, Normal tympanic membrane mobility.    NOSE (speculum or scope)  Architecture:  Normal, Grossly normal external nasal architecture with no masses or lesions.  Mucosa:  Normal mucosa, No polyps or masses.  Septum:  Normal, Septum non-obstructing.  Turbinates:  Normal, No turbinate abnormalities    ORAL CAVITY AND OROPHARYNX  Lips:  Normal.  Dental and gingiva:  Normal, No obvious dental or gingival disease.  Mucosa:  Normal, Moist mucous membranes.  Tongue:  Normal, Tongue mobile with no mucosal abnormalities  Hard and soft palate:  Normal, Hard and soft palate without cleft or mucosal lesions.  Oral pharynx:  Normal, Posterior pharynx without lesions or remarkable " asymmetry.  Saliva:  Normal, Clear saliva.  Masses:  Normal, No palpable masses or pathologically enlarged lymph nodes.    NECK  Masses/lymph nodes:  Normal, No worrisome neck masses or lymph nodes.  Salivary glands:  Normal, Parotid and submandibular glands.  Trachea and larynx position:  Normal, Trachea and larynx midline.  Thyroid:  Normal, No thyroid abnormality.  Tenderness:  Normal, No cervical tenderness.  Suppleness:  Normal, Neck supple    NEUROLOGICAL  Speech pattern:  Normal, Proasaic    RESPIRATORY  Symmetry and Respiratory effort:  Normal, Symmetric chest movement and expansion with no increased intercostal retractions or use of accessory muscles.     HEARING TEST  Results of hearing test as documented in audiology notes which were reviewed.    IMPRESSION  Tinnitus related to sensorineural hearing loss. I discussed the pathophysiology with her using online images.     RECOMMENDATION  I discussed hearing aid evaluation. She understands the elective nature of acquiring hearing aids. She also understands that the tinnitus is something that she is going to have live with.    Benjie Estrada MD
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
## 2021-05-30 VITALS — WEIGHT: 136.3 LBS

## 2021-05-30 VITALS — WEIGHT: 134.56 LBS | HEIGHT: 63 IN | BODY MASS INDEX: 23.84 KG/M2
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
## 2021-06-01 ENCOUNTER — AMBULATORY - HEALTHEAST (OUTPATIENT)
Dept: RADIATION ONCOLOGY | Facility: HOSPITAL | Age: 36
End: 2021-06-01
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
## 2021-06-02 ENCOUNTER — AMBULATORY - HEALTHEAST (OUTPATIENT)
Dept: RADIATION ONCOLOGY | Facility: HOSPITAL | Age: 36
End: 2021-06-02

## 2021-06-02 ENCOUNTER — OFFICE VISIT - HEALTHEAST (OUTPATIENT)
Dept: RADIATION ONCOLOGY | Facility: HOSPITAL | Age: 36
End: 2021-06-02

## 2021-06-02 DIAGNOSIS — C50.912 INVASIVE DUCTAL CARCINOMA OF BREAST, LEFT (H): ICD-10-CM
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
## 2021-06-03 VITALS — BODY MASS INDEX: 24.88 KG/M2 | WEIGHT: 140.44 LBS

## 2021-06-03 VITALS — WEIGHT: 140 LBS | BODY MASS INDEX: 24.8 KG/M2

## 2021-06-03 VITALS — BODY MASS INDEX: 24.8 KG/M2 | WEIGHT: 140 LBS | HEIGHT: 63 IN

## 2021-06-03 VITALS — WEIGHT: 139 LBS | HEIGHT: 63 IN | BODY MASS INDEX: 24.63 KG/M2
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
## 2021-06-05 VITALS
TEMPERATURE: 97.5 F | WEIGHT: 144 LBS | HEART RATE: 87 BPM | HEIGHT: 63 IN | SYSTOLIC BLOOD PRESSURE: 102 MMHG | BODY MASS INDEX: 25.52 KG/M2 | DIASTOLIC BLOOD PRESSURE: 60 MMHG

## 2021-06-05 VITALS — WEIGHT: 144.8 LBS | BODY MASS INDEX: 25.65 KG/M2

## 2021-06-05 VITALS
WEIGHT: 138.31 LBS | BODY MASS INDEX: 22.23 KG/M2 | BODY MASS INDEX: 22.23 KG/M2 | HEIGHT: 66 IN | HEIGHT: 66 IN | WEIGHT: 138.31 LBS

## 2021-06-05 VITALS
RESPIRATION RATE: 16 BRPM | HEIGHT: 63 IN | HEART RATE: 77 BPM | SYSTOLIC BLOOD PRESSURE: 96 MMHG | BODY MASS INDEX: 26.05 KG/M2 | OXYGEN SATURATION: 98 % | WEIGHT: 147 LBS | DIASTOLIC BLOOD PRESSURE: 66 MMHG

## 2021-06-05 VITALS
DIASTOLIC BLOOD PRESSURE: 67 MMHG | HEIGHT: 63 IN | HEART RATE: 75 BPM | SYSTOLIC BLOOD PRESSURE: 108 MMHG | BODY MASS INDEX: 25.23 KG/M2 | OXYGEN SATURATION: 97 % | WEIGHT: 142.4 LBS | RESPIRATION RATE: 16 BRPM

## 2021-06-05 VITALS — BODY MASS INDEX: 25.16 KG/M2 | HEIGHT: 63 IN | WEIGHT: 142 LBS

## 2021-06-05 VITALS
SYSTOLIC BLOOD PRESSURE: 115 MMHG | HEART RATE: 84 BPM | DIASTOLIC BLOOD PRESSURE: 59 MMHG | WEIGHT: 146.3 LBS | OXYGEN SATURATION: 99 % | BODY MASS INDEX: 25.92 KG/M2

## 2021-06-05 VITALS
BODY MASS INDEX: 25.34 KG/M2 | WEIGHT: 143 LBS | HEART RATE: 90 BPM | SYSTOLIC BLOOD PRESSURE: 95 MMHG | HEIGHT: 63 IN | OXYGEN SATURATION: 98 % | DIASTOLIC BLOOD PRESSURE: 57 MMHG

## 2021-06-05 VITALS
SYSTOLIC BLOOD PRESSURE: 108 MMHG | BODY MASS INDEX: 25.23 KG/M2 | HEART RATE: 75 BPM | RESPIRATION RATE: 16 BRPM | OXYGEN SATURATION: 97 % | WEIGHT: 142.4 LBS | DIASTOLIC BLOOD PRESSURE: 67 MMHG | HEIGHT: 63 IN

## 2021-06-05 VITALS — BODY MASS INDEX: 25.93 KG/M2 | WEIGHT: 146.4 LBS

## 2021-06-08 NOTE — PROGRESS NOTES
[facility] Follow Up Note    Assessment/Plan:  1. Grief reaction and reactive depression.  Patient does have moderate depressive symptoms.  She wants to continue seeing a therapist and referral was provided.  She will follow-up in 2 weeks to decide with that she wants to try antidepressant.  I did recommend that antidepressant with her severity of symptoms would be appropriate.  He can also help her with sleep and tension headaches.  She was on medical leave for the months of January and I did fill out her paperwork last time.  Currently she is back to work.  Discussed if she needs a letter for accommodations for her visits to the doctor and the psychologist he can write 1.  - Ambulatory referral to Psychology    2. Tension headache  Secondary to stress.  She is is a ibuprofen as needed.  Will continue Prilosec with that due to history of acid reflux.  - ibuprofen (ADVIL,MOTRIN) 200 MG tablet; Take 2 tablets (400 mg total) by mouth every 6 (six) hours as needed for pain.  Dispense: 30 tablet; Refill: 0    3.  Insomnia.  Improved on Ambien.  Patient is using it only as needed and not daily.    Xiomara Block MD    Chief Complaint:  Chief Complaint   Patient presents with     Follow-up     Headache     IMPROVEMENT NOTED     Grief/loss       History of Present Illness:  Xiao is a 31 y.o. female this history of migraines and occasional back pain who is currently here for follow-up for situational depression, insomnia and tension headaches.    Patient's  committed suicide on January 1, 2017.  Patient was the one who found him and still having flashbacks.  She developed acute grieving reaction and situational depression.  I did recommend that she goes on temporary medical leave to sort out her life and new routine was taking care of her children.  Patient did stay home for the months of January.  She did see a counselor for 3 visits which helped but now she needs a referral for insurance to cover  more visits.  She did go back to work in February because could not stand staying home anymore.  She wants to sell her current house and buying new house so it does not remind him of her .  She does get help from from her mother.  I did prescribe Ambien for severe insomnia and it helped a lot.  Currently she only uses it as needed 3 times a week and is able to fall asleep and have a good night sleep without Ambien several days a week.  Appetite is still not back to normal and she lost 2 pounds.  She still has depressive symptoms and showed pH Q9 was 21 out of 27.  Discusses that is moderate depression medical treatment and therapy would be most efficacious.  For now patient wants to establish with new psychologist and then decide on medical therapy.  She will follow-up with me in 2 weeks.  She denies any anxiety or panic attacks.    Patient also has chronic tension headaches and some tension in the neck and trapezius muscles.  She uses ibuprofen as needed several times a week.  She does have intermittent acid reflux for which she takes Prilosec.  No abdominal pain or blood in the stool.  No problems his range of motion in her neck, upper extremity pain and numbness.    Review of Systems:  A comprehensive review of systems was performed and was otherwise negative.  No chest pains palpitations or dizziness.  No panic attacks.    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  She is a  and has 2 teenaged children.  Her  committed suicide in 2016.  She does have brothers and sisters and her mom in the area.       Past History: Reviewed  Current Outpatient Prescriptions   Medication Sig Dispense Refill     ibuprofen (ADVIL,MOTRIN) 200 MG tablet Take 2 tablets (400 mg total) by mouth every 6 (six) hours as needed for pain. 30 tablet 0      "omeprazole (PRILOSEC) 20 MG capsule Take 1 capsule (20 mg total) by mouth Daily before breakfast. 30 capsule 2     zolpidem (AMBIEN) 5 MG tablet Take 1 tablet (5 mg total) by mouth bedtime as needed for sleep. 30 tablet 0     No current facility-administered medications for this visit.        Family History: Reviewed    Physical Exam:    Vitals:    02/15/17 1423   BP: (!) 84/52   Patient Site: Left Arm   Patient Position: Sitting   Cuff Size: Adult Regular   Pulse: 80   Resp: 16   SpO2: 97%   Weight: 134 lb 9 oz (61 kg)   Height: 5' 3\" (1.6 m)     Wt Readings from Last 3 Encounters:   02/15/17 134 lb 9 oz (61 kg)   01/18/17 136 lb 4.8 oz (61.8 kg)   01/05/15 142 lb 14.4 oz (64.8 kg)     Body mass index is 23.84 kg/(m^2).    Constitutional:  Reveals a pleasant female.  Vitals:  Per nursing notes.  HEENT:No cervical LAD, no thyromegaly,  conjunctiva is pink, no scleral icterus, TMs are visualized and normal bl, oropharynx is clear, no exudates,   Cardiac:  Regular rate and rhythm,no murmurs, rubs, or gallops.Legs without edema. Palpation of the radial pulse regular.  Lungs: Clear to auscultation bl.  Respiratory effort normal.  Abdomen:positive BS, soft, nontender, nondistended.  No hepato-splenomagaly  Skin:   Without rash, bruise, or palpable lesions.     Psychiatric: affect is slightly flat but she is not tearful, memory intact.  No flight of ideas or pressured speech.  Thought processes is linear    Data Review:    Analysis and Summary of Old Records (2): Yes     Records Requested (1): No      Other History Summarized (from other people in the room) (2): No    Radiology Tests Summarized (XRAY/CT/MRI/DXA) (1): No    Labs Reviewed (1): Yes    Medicine Tests Reviewed (EKG/ECHO/COLONOSCOPY/EGD) (1): No    Independent Review of EKG or X-RAY (2): No    "

## 2021-06-08 NOTE — PROGRESS NOTES
[facility] Follow Up Note    Assessment/Plan:    1. Headache  Family tension headache due to insomnia and decreased oral intake.  Blood ration was also on the lower side.  Recommended that patient drinks plenty of fluids and take some Gatorade, next frequently to maintain adequate sugar levels.  I suspect that improvement in her sleep will improve her headache as well.    2. Grief reaction, Ms. elements of PTSD and severe insomnia.  Patient will continue seeing a therapist.  We'll start by giving her sleeping medication.  If after getting adequate sleep she continues to have flashbacks and worsening mood will put her on antidepressant at that time.  As it overall showed very high risk for depression due to the circumstance of her 's death.  Currently because patient needs to get into a new routine taking care of her kids is a single mom she does want to go on medical leave and bladder for her work was provided.  She will see me back in follow-up in several weeks.  She'll try Ambien at night for insomnia.    - zolpidem (AMBIEN) 5 MG tablet; Take 1 tablet (5 mg total) by mouth bedtime as needed for sleep.  Dispense: 30 tablet; Refill: 0    3. GERD (gastroesophageal reflux disease)  Due to overuse of ibuprofen and decreased by mouth intake.  She will start on Prilosec.  - omeprazole (PRILOSEC) 20 MG capsule; Take 1 capsule (20 mg total) by mouth Daily before breakfast.  Dispense: 30 capsule; Refill: 2      Xiomara Block MD    Chief Complaint:  Chief Complaint   Patient presents with     Headache     due to stress      Stress     to loss of  due to suicide on 1/1/2017       History of Present Illness:  Xiao is a 31 y.o. female with history of migraines and intermittent low back pain who is currently here to meet me for the first time and address acute issues with symptoms of insomnia, headaches and acute grief reaction.    Patient has apparent did have mental illness and was not  consistently taking his medications and committed suicide on January 1, 2017.  Patient was the one who found him dead.  Thankfully children were not around at that time.  Currently she is in acute grieving process but also have some symptoms of PTSD this flashbacks.  She denies any anxiety or panic attacks but is very hard for her to sleep and she has had severe insomnia.  She also has not been very hungry and not eating much.  Historically her blood pressure has always been low but currently it's gotten slightly lower and she is having mild dizziness and intermittent tension headache.  She indicates to seeing counselor.  Patient is also really jealous and gets support from her [employer].  Her mom did move in with them and she also has close brothers and sisters in the area.    Patient does have history of migraines but no aura.  Currently she has been taking some 6-800 mg of Advil several times a day.  Normally when she cries makes her headaches worse.  Normally headaches of 5 out of 10 in severity.  She has been having more symptoms of acid reflux due to increased use of ibuprofen.    Review of Systems:  A comprehensive review of systems was performed and was otherwise negative.  No chest pains palpitations, no shortness of breath, since she has been not eating much she did develop significant constipation.  Acid reflux is getting worse but no blood in the stool.    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  She is a  and has 2 teenaged children.  Her  committed suicide in 2016.  She does have brothers and sisters and her mom in the area.       Past History: Reviewed  Current Outpatient Prescriptions   Medication Sig Dispense Refill     ibuprofen (ADVIL,MOTRIN) 200 MG tablet Take 400 mg by mouth every 6 (six) hours as needed for pain.        omeprazole (PRILOSEC) 20 MG capsule Take 1 capsule (20 mg total) by mouth Daily before breakfast. 30 capsule 2     zolpidem (AMBIEN) 5 MG tablet Take 1 tablet (5 mg total) by mouth bedtime as needed for sleep. 30 tablet 0     No current facility-administered medications for this visit.        Physical Exam:    Vitals:    01/18/17 1302 01/18/17 1306   BP: (!) 88/60 (!) 86/60   Patient Site: Left Arm Left Arm   Patient Position: Sitting Sitting   Cuff Size: Adult Regular Adult Regular   Pulse: 84    Temp:  98.9  F (37.2  C)   TempSrc:  Oral   SpO2: 98%    Weight: 136 lb 4.8 oz (61.8 kg)      Wt Readings from Last 3 Encounters:   01/18/17 136 lb 4.8 oz (61.8 kg)   01/05/15 142 lb 14.4 oz (64.8 kg)     There is no height or weight on file to calculate BMI.    Constitutional:  Reveals a pleasant female.  Vitals:  Per nursing notes.  HEENT:No cervical LAD, no thyromegaly,  conjunctiva is pink, no scleral icterus, TMs are visualized and normal bl, oropharynx is clear, no exudates,   Cardiac:  Regular rate and rhythm,no murmurs, rubs, or gallops. Legs without edema. Palpation of the radial pulse regular.  Lungs: Clear to auscultation bl.  Respiratory effort normal.  Abdomen:positive BS, soft, nontender, nondistended.  No hepato-splenomagaly  Skin:   Without rash, bruise, or palpable lesions.  Neurologic:  Cranial nerves II-XII intact.     Psychiatric: affect slightly flat, memory intact.  No flight of ideas or pressured speech.    Data Review:    Analysis and Summary of Old Records (2): Yes      Records Requested (1): No      Other History Summarized (from other people in the room) (2): No    Radiology Tests Summarized (XRAY/CT/MRI/DXA) (1): No    Labs Reviewed (1): Yes    Medicine Tests Reviewed (EKG/ECHO/COLONOSCOPY/EGD) (1): No    Independent Review of EKG or X-RAY (2): No

## 2021-06-14 ENCOUNTER — COMMUNICATION - HEALTHEAST (OUTPATIENT)
Dept: RADIATION ONCOLOGY | Facility: HOSPITAL | Age: 36
End: 2021-06-14

## 2021-06-14 NOTE — TELEPHONE ENCOUNTER
Reason for Call:  Other returning call      Detailed comments: pt calling back for Dr Peterson- I did not see a message to relay- would like Dr MOSES to call her back    Phone Number Patient can be reached at: Home number on file [phone] (home)    Best Time: 1/27/21 in AM    Can we leave a detailed message on this number?: Yes    Call taken on 1/26/2021 at 4:49 PM by Frances Andujar

## 2021-06-14 NOTE — PROGRESS NOTES
Assessment:     1. Vaccines and biological substances causing adverse effect in therapeutic use, initial encounter  cyclobenzaprine (FLEXERIL) 10 MG tablet   2. Routine general medical examination at a health care facility  cholecalciferol, vitamin D3, (VITAMIN D3) 50 mcg (2,000 unit) Tab   3. Strain of neck muscle, initial encounter       SIRVA-is to be considered in differential diagnosis.  Previous exacerbation of neck muscle strain is also possibility.  At this time continue to monitor with rice therapy.  Patient is wondering if she can continue to work.  Patient has continued to work during the acute phase which was on 12/22.  This is now 3 weeks down the line and at this time if it does not improve we may need to consider orthopedic referral for cortisone etc.  She can work as tolerated.  Trial of Flexeril    Following discussed from up-to-date    Clinical features of shoulder injury related to vaccine administration  subdeltoid or subacromial bursitis include shoulder pain and reduced range of motion at the shoulder within a few hours of vaccination without evidence of microbial infection. Pain and reduced function can be prolonged. Management may include physiotherapy and intra-articular glucocorticoid injections       Plan:      The diagnosis was discussed with the patient and evaluation and treatment plans outlined.  Reassured patient that symptoms are almost certainly benign and self-resolving.  Follow up: Return in about 2 weeks (around 1/25/2021) for recheck.     Subjective:      Xiao Beal is a  female who presents for evaluation of   Chief Complaint   Patient presents with     Neck Pain     Pt here today to evaluate LT side neck pain that radiates to arm, shoulder affecting ROM. Started after getting COVID vaccine 12/22/2020     Patient continues to work on until  today when she went for her second shot.  Since she complained about severe pain, a second series was deferred.  She denies any  "fevers or loss of range of motion.  She does complain of difficulty and pain with overhead range of motion.    The following portions of the patient's history were reviewed and updated as appropriate: allergies, current medications, past family history, past medical history, past social history, past surgical history and problem list.    Review of Systems  Constitutional: negative  Respiratory: negative  Cardiovascular: negative       Objective:   /67 (Patient Site: Left Arm, Patient Position: Sitting, Cuff Size: Adult Regular)   Pulse 75   Resp 16   Ht 5' 3.11\" (1.603 m)   Wt 142 lb 6.4 oz (64.6 kg)   LMP 09/01/2020 (Approximate)   SpO2 97%   BMI 25.14 kg/m      GENERAL: Healthy, alert and no distress  EYES: Eyes grossly normal to inspection. No discharge or erythema, or obvious scleral/conjunctival abnormalities.  RESP: No audible wheeze, cough, or visible cyanosis.  No visible retractions or increased work of breathing.    MS: Strength in upper extremity is normal.  Some generalized tenderness in shoulder and trapezius area.  Range of motion restricted over the hand motion.  NEURO: Cranial nerves grossly intact. Mentation and speech appropriate for age.  PSYCH: Mentation appears normal, affect normal/bright, judgement and insight intact, normal speech and appearance well-groomed    "

## 2021-06-14 NOTE — PROGRESS NOTES
"   Assessment:     1. Left breast lump  Mammo Diagnostic Bilateral    US Breast Left Limited 1-3 Quadrants     Medical decision making: Patient with an indeterminate/poorly demarcated left breast lump with some puckering of the overlying skin presents today.  She informs me that she is always had breast lumps and has had biopsies done which were benign.       Plan:      The diagnosis was discussed with the patient and evaluation and treatment plans outlined.      Subjective:      Xiao Beal is a  female who presents for evaluation of   Chief Complaint   Patient presents with     Breast Mass     Pt here today to evaluate lump/ puckering of skin found in LT breast x 2-3 mo ago     Patient does give a history of breast lumps and biopsies in the past which have been negative/normal.  The current lump is on the left breast on the upper and outer side.  She denies any pain.  She does not report any discharge.      The following portions of the patient's history were reviewed and updated as appropriate: allergies, current medications, past family history, past medical history, past social history, past surgical history and problem list.    Review of Systems  Constitutional: negative  Respiratory: negative  Cardiovascular: negative       Objective:   /67 (Patient Site: Left Arm, Patient Position: Sitting, Cuff Size: Adult Regular)   Pulse 75   Resp 16   Ht 5' 3.11\" (1.603 m)   Wt 142 lb 6.4 oz (64.6 kg)   SpO2 97%   BMI 25.14 kg/m      GENERAL: Healthy, alert and no distress  EYES: Eyes grossly normal to inspection. No discharge or erythema, or obvious scleral/conjunctival abnormalities.  RESP: No audible wheeze, cough, or visible cyanosis.  No visible retractions or increased work of breathing.    BREAST: normal without masses, tenderness or nipple discharge of the right breast, no palpable axillary masses or adenopathy and   Left breast has a dimple on the left upper and outer quadrant with underlying " hardness.  No discrete mass but palpable extended area of hardness in an area under the dimple which cannot be clearly demarcated.  NEURO: Cranial nerves grossly intact. Mentation and speech appropriate for age.  PSYCH: Mentation appears normal, affect normal/bright, judgement and insight intact, normal speech and appearance well-groomed

## 2021-06-14 NOTE — PROGRESS NOTES
History:  This is a 35 y.o. female who I'm asked to see (by Dr. Jones) for evaluation of a left breast cancer.  She presents with her sister.  This was picked up by the patient.   A few months ago she noticed that there was a skin indentation in the upper outer quadrant of the left breast.  She never really did anything about it until after receiving her first dose of the Covid vaccine.  She had significant left arm and neck pain.  She was evaluated in her primary's office and she brought up the left breast indentation.  A mass was palpated under it.  This led to imaging work-up followed by a needle biopsy which shows invasive ductal carcinoma.  It is estrogen receptor positive, progesterone receptor negative and HER-2 negative.    Past medical history:  Denies    Past surgical history:   x2    Medications:     acetaminophen (TYLENOL) 325 MG tablet, Take 650 mg by mouth every 6 (six) hours as needed for pain., Disp: , Rfl:      ibuprofen (ADVIL,MOTRIN) 200 MG tablet, Take 2 tablets (400 mg total) by mouth every 6 (six) hours as needed for pain., Disp: 30 tablet, Rfl: 0    Allergies:  No Known Allergies    Social History:  Reports that she has never smoked. She has never used smokeless tobacco. She reports that she does not drink alcohol or use drugs.  Has 2 daughters 14 and 15 years old.    Family History:  She has no family history of breast cancer.  Her father  of lung cancer.    Review of systems:  General ROS: No complaints or constitutional symptoms  Skin: No complaints or symptoms   Hematologic/Lymphatic: No symptoms or complaints  Psychiatric: No symptoms or complaints  Endocrine: No excessive fatigue, no hypermetabolic symptoms reported  Respiratory ROS: No cough, shortness of breath, or wheezing  Cardiovascular ROS: No chest pain or dyspnea on exertion  Breast ROS: Left breast skin indentation.  No rash or nipple discharge.  Gastrointestinal ROS: No abdominal pain, nausea, diarrhea, or  "constipation  Musculoskeletal ROS: No recent injuries reported  Neurological ROS: No focal neurologic defects reported.      Physical exam:  Resp 16   Ht 5' 3.11\" (1.603 m)   Wt 142 lb (64.4 kg)   LMP 09/01/2020 (Approximate)   BMI 25.07 kg/m    General: Alert, cooperative, appears stated age   Skin: Skin color, texture, turgor normal, no rashes or lesions   Lymphatic: No obvious adenopathy, no swelling   Eyes: No scleral icterus, pupils equal  HENT: No traumatic injury to the head or face, no gross abnormalities  Lungs: Normal respiratory effort, breath sounds equal bilaterally  Heart: Regular rate and rhythm  Breasts: Left breast extreme upper outer quadrant just before axilla is a more prominent skin indentation compared to the right.  With palpation in this area, there is a fullness just deep to the skin measuring a little over a centimeter.  There is no palpable lymphadenopathy or any other changes bilaterally.  Abdomen: Soft, non-distended and non-tender to palpation  Neurologic: Grossly intact    Imaging:  Pertinent images personally reviewed by myself and discussed with the patient.  Radiology reports:  EXAM: MAMMO DIAGNOSTIC 3D BILATERAL, US BREAST BILATERAL LIMITED 1-3 QUADRANTS  LOCATION: [facility]  [facility]  DATE/TIME: 01/20/2021, 12:58 PM     INDICATION: Lump in the left upper outer quadrant.  COMPARISON: 12/30/2013.     MAMMOGRAPHIC FINDINGS: Bilateral full-field digital diagnostic mammograms performed. The breasts are heterogeneously dense, which may obscure small masses. Images evaluated with the assistance of CAD. Breast tomosynthesis was used in interpretation. Multiple circumscribed masses are seen in the right breast. One contains chunky calcifications consistent with a benign fibroadenoma. This was evaluated on the prior exam. The others are in the inferior breast. In the left breast, there is a circumscribed mass with a marker clip from prior biopsy. Under the BB in " the left upper outer quadrant, there is a spiculated mass which is suspicious for malignancy. Scattered benign-appearing round and chunky calcifications are seen in both breasts.     ULTRASOUND FINDINGS: Targeted bilateral breast ultrasound was performed by both the ultrasonographer and the radiologist. In the left breast in the 1:00 position 9 cm from the nipple, in the location of the palpable abnormality, there is a spiculated mass measuring 11 x 13 by approximately 11 mm. It is highly suspicious for malignancy and correlates with the palpable and mammographic abnormalities. The left axilla appears normal with no evidence of lymphadenopathy.     In the right breast in the 8:00 position 6 cm from the nipple, there is a hypoechoic circumscribed solid mass measuring 12 x 7 x 12 mm correlating with the largest lesion seen on the patient's mammogram. Multiple benign cysts are seen. In the 6:00 position 5 cm from the nipple, there was a hypoechoic lesion which was taller than it was wide with slight shadowing measuring approximately 7 mm in greatest dimension. Images of this were not saved appropriately. It is indeterminate.     IMPRESSION:   1.  Palpable mass in the left breast is highly suspicious for malignancy. Ultrasound-guided core biopsy and surgical consultation is recommended.     2.  Mass in the right breast in the 8:00 position likely represents benign fibroadenoma. However, ultrasound-guided core biopsy is recommended to confirm this given its size.     3.  Hypoechoic lesion in the right breast in the 6:00 position is indeterminate and ultrasound-guided aspiration or biopsy of this lesion is recommended.     4.  The findings and the recommendations were discussed with the patient at the time of exam. The patient is scheduled with Dr. Brambila for surgical consultation on Wednesday, 01/27/2021 and for ultrasound-guided core biopsy of the lesions described above   tomorrow.     ACR BI-RADS Category 5: Highly  Suggestive of Malignancy.       Pathology:  A) BREAST, LEFT, 1 O'CLOCK, 9 CM FROM NIPPLE, MASS, ULTRASOUND-GUIDED NEEDLE CORE BIOPSY:         - INVASIVE DUCTAL CARCINOMA, BIANCA GRADE I (TUBULES 2, NUCLEI 2, MITOSIS 1)         - GREATEST LENGTH OF INVASIVE TUMOR, 9 MM         - MICROCALCIFICATION PRESENT IN INVASIVE CARCINOMA         - DCIS IS NOT IDENTIFIED           ESTROGEN RECEPTOR:  POSITIVE, STRONG STAINING IN GREATER THAN 95% OF INVASIVE CARCINOMA   PROGESTERONE RECEPTOR:  POSITIVE, WEAK STAINING IN 1-5% OF INVASIVE CARCINOMA   HER2/AAKASH:  NEGATIVE       B) LYMPH NODE, LEFT, AXILLA, MASS, ULTRASOUND-GUIDED NEEDLE CORE BIOPSY:         - LYMPH NODE TISSUE WITH REACTIVE LYMPHOID HYPERPLASIA         - NEGATIVE FOR METASTATIC CARCINOMA (0/1)     C) BREAST, RIGHT, 6 O'CLOCK, 5 CM FROM NIPPLE, MASS, ULTRASOUND-GUIDED NEEDLE CORE BIOPSY:         - FIBROADENOMA           - NEGATIVE FOR CARCINOMA     D) BREAST, RIGHT, 8 O'CLOCK, 6 CM FROM NIPPLE, MASS, ULTRASOUND-GUIDED NEEDLE CORE BIOPSY:         - FIBROADENOMA, USUAL TO FLORID DUCTAL HYPERPLASIA AND SCLEROSING ADENOSIS WITH           MICROCALCIFICATIONS         - NEGATIVE FOR CARCINOMA     IMPRESSION:  Left breast invasive ductal carcinoma, ER+, HER2-    PLAN:   Discussed the surgical options for treatment of breast cancer which generally are a lumpectomy (partial mastectomy) combined with radiation versus a mastectomy.  Explained that the survival benefit is the same for both.  The difference is in local recurrence risk.  The patient is a good candidate for a lumpectomy.  It is palpable and just deep to the skin.  It would require some skin removal along with the lumpectomy.  If we performed mastectomy, she would also need skin removed over this area of the tumor because it feels close to invading through the epidermis.  Discussed SLN biopsy.  The procedure and rationale were explained.   Discussed that at this point we do not know yet whether or not she will  need chemotherapy and we may not know until we get all of the results of surgery back.  Sometimes the need for chemotherapy is dependent upon an Oncotype score.  Since the tumor is estrogen receptor positive, she will be a candidate for endocrine therapy.  Lastly, with her young age, she would qualify for genetic testing if she was interested.  Sometimes the results of genetic testing will help to make a surgical decision.    After our discussion, all questions were answered to satisfaction.  She is interested in obtaining urgent genetic referral so that she can get these results.  The results would likely sway her one way or another surgically.  In the meantime, if she is more strongly considering mastectomy with reconstructive surgery, then I encouraged her to meet with plastic surgery to discuss reconstructive options and postoperative recovery and restrictions.  We briefly discussed that lumpectomy or mastectomy would be performed on an outpatient basis with same-day discharge.  Lumpectomy or mastectomy alone would be at the outpatient surgery center.  If reconstruction is involved, then surgery would be at [facility].  She would follow-up with myself about 1 week after surgery to review final pathology and next steps.    She will call the office or message me through [employer] if she has any questions or concerns in the meantime.      Theresa Randall DO  General Surgeon  [facility]  Breast [facility]  [address] [place] [id number]  Office: [phone]  Employed by - [facility]

## 2021-06-14 NOTE — TELEPHONE ENCOUNTER
Patient is calling clinic back to talk with Dr. Peterson. Patient will be available until 4pm today to call. Patient would like to discuss her test results.

## 2021-06-14 NOTE — TELEPHONE ENCOUNTER
Pt called back again to make sure you had correct phone number. Best number to reach her is [phone]

## 2021-06-14 NOTE — PROGRESS NOTES
Per Breast Radiologist request, scheduled patient to see Dr. Randall  for a surgical consult on Weds, 1-27-21 at 1400 at the [facility].

## 2021-06-14 NOTE — PROGRESS NOTES
"Xiao presents to [facility] today for a surgical consult with Dr. Randall  regarding her newly diagnosed left breast cancer.  She is accompanied by her sister  for consult. 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  She has two children, ages 14 and 15.   RN assessment and EMR update. Resp 16   Ht 5' 3.11\" (1.603 m)   Wt 142 lb (64.4 kg)   LMP 09/01/2020 (Approximate)   BMI 25.07 kg/m    Patient given a Breast Cancer Packet, contents reviewed.  She met with Dr. Randall  see dictation for details of visit. She will plan to meet with Genetics and make a decision after that. Support provided, invited calls.  RN time 20 mins  "

## 2021-06-14 NOTE — TELEPHONE ENCOUNTER
Telephoned and left voice message for Xiao requesting call back at her earliest convenience.  She did return my call and and we discussed pathology results from the following studies performed at [facility] on 1/20/21:    Left breast, 1 o'clock, 9 cm from nipple, mass, US-guided needle core biopsy,    Lymph node, left, axilla mass, US-guided needle core biopsy,     Right breast, 6 o'clock, 5 cm from nipple, mass US needle core biopsy and     Right breast, 8 o'clock, 6 cm from nipple, mass US-guided needle core biopsy     We reviewed breast anatomy, histologic type, histologic grade, and hormone receptors.  She already has surgical consult scheduled with Dr. Theresa Randall on 1/27/21.  She verbalized understanding of appointment details, location, and she currently will bet allowed to bring one visitor.      Xiao requested information about breast cancer be sent to her e-mail address and that has gone out.      Emotional support and encouragement provided and calls welcomed.  I remain a resource to Xiao as needed.     A second e-mail, below, was also sent:  Dear Xiao:  Please see my first e-mail for resources about breast cancer.  The following organization and  [facility] - [email or web address]     Facing Cancer Together programs- [email or web address]   Attached are booklets for you and for your children. [facility] also has wonderful virtual programming during the pandemic.    Emergency Financial Assistance - [email or web address]  [employer] Fund.  If eligible funds can be used toward rent/mortgage, utilities, or [employer] gift cards, or [employer] gift cards.  If you meet the income criterial, you can apply for this independently, or if you would like me to assist, I can use their online portal to apply for you.    Financial Cancer Care - This program aims to help cancer patients reduce both  short- and long-term financial toxicity. This free program is open to anyone in active cancer treatment who is over age 18, financially stable, not currently working with a , and living in the [facility].  [employer]-https://www.[email or web address]/   This wonderful ywpm-fu-tfhh support program has been a huge help to many of our patients.  They also have a social media presence on Facebook (the  Nonprofit Organization  NOT  [employer] ) and Twitter as [email or web address].  Please note they have a podcast as well entitled Breast Cancer 1 to 1 with [employer] and you can find that on most podcast services.   Their phone number is [phone].  I urge you to reach out to them to be matched with a guide.  Young Survival Coalition-http://community.youngsurvival.org/     https://www.youngsurvival.org/connect/find-support-online   [facility] Discussion Boards  Share and discuss information with other young women online 24/7.  Private Facebook Group  Request to join the [facility] private Facebook group--a place to share and feel supported by your peers.  [facility] Virtual Hangouts  Join one of our virtual hangouts--monthly meetings where you can share feelings, experiences, information and resources from the comfort of your own home.   Please call if I may be of help!  Warm regards,  Sharita Lieberman RN, OCN, CBCN  Nurse Navigator  [facility]  Cancer [address] [address]  [email or web address]  [email or web address]   Office: [phone]  Employed by [facility] [employer]

## 2021-06-14 NOTE — PATIENT INSTRUCTIONS - HE
Shoulder Injury Related to Vaccine Administration (SIRVA)  What is SIRVA?  Shoulder injury related to vaccine administration, or SIRVA, happens when a vaccine is injected too high or too deep in the shoulder. Injecting the vaccine this way can lead to intense and prolonged pain and other shoulder injuries, such as a rotator cuff tear or tendonitis.  Shoulder injections are typically given in the deltoid muscle. When the injection is given too high or too deep in this muscle, the needle can hit bone or puncture the fluid-filled sac called the bursa, which protects the tendons in the shoulder. When this happens, the bursa, tendons and ligaments can become inflamed.  SIRVA can happen when a provider uses a needle that is too long for the patient or does not inject in the correct spot in the shoulder.  What are the symptoms of SIRVA?  Most vaccinations into the shoulder can cause muscle irritation. However, pain from SIRVA results from injury and inflammation in the shoulder joint and can be intense and long lasting.  Shoulder pain is the most common symptom of SIRVA, and it can begin immediately or up to 48 hours after the injection. Your shoulder can also be difficult to move.  How is SIRVA diagnosed?  People with symptoms of SIRVA should see a doctor as soon as possible. Your doctor will do a physical examination and may order tests to look for injury or inflammation in your shoulder.  It s important to tell your doctor that your symptoms began soon after a vaccination, since that may be the key to getting the correct diagnosis.  How is SIRVA treated?  Treatments for SIRVA are similar to those for other shoulder injuries. Your doctor may recommend one or more of the following treatments:  Rest. SIRVA injuries involve inflammation, and simply resting the muscle, ligaments and tendons in your shoulder may lead to recovery.   Physical therapy. Physical therapy can help improve your range of motion and muscle strength  and speed your healing process.   Pain medication. This can include ibuprofen or acetaminophen. Your doctor may also suggest a prescription medication.   Steroid injections or oral steroids. If other treatments haven t helped, your doctor may prescribe a steroid medication to suppress inflammation. Your doctor will assess your condition carefully before prescribing this medication.   Surgery. If SIRVA has caused a condition that can be helped with surgery, your doctor may recommend a specific procedure as a final option.  Can vaccines cause SIRVA?  Although most SIRVA injuries happen with the seasonal flu shot, it s not the vaccine itself that causes the injury. SIRVA can be caused by any vaccine injection, and the high number of injuries with the flu shot happen because so many people get this injection every year.  The National Vaccine Injury Compensation Program (VICP) includes SIRVA as a covered injury. You may qualify to receive compensation for a SIRVA claim.  Have you been injured?  Many people with SIRVA don t realize that their symptoms are the result of a vaccination. If you have shoulder pain that began within 48 hours of a vaccination, along with restricted movement in your shoulder, you could be suffering from SIRVA

## 2021-06-14 NOTE — TELEPHONE ENCOUNTER
Who is calling:  pt  Reason for Call:  Pt requesting provider to place fasting labs, has concerns and wants to get labs done before her apt today with provider and if possible if she can come in for phy today    Date of last appointment with primary care: na  Okay to leave a detailed message: Yes

## 2021-06-14 NOTE — PROGRESS NOTES
"  1. Malignant neoplasm of upper-outer quadrant of left breast in female, estrogen receptor positive (H)  Ambulatory referral to Oncology/Hematology Adult   2. Special screening for malignant neoplasms, colon  Ambulatory referral for Colonoscopy   3. Pain of left upper arm       Medical decision: Patient is here today for follow-up on breast lump and arm pain.  She is undergoing mammogram/ultrasound and further biopsy and this is invasive breast CA.    Will pursue health maintenance for this patient whose biological age is around 50 years.  Advised colon cancer screening.  Referral to heme-onc for detail discussion further on her options.  Patient does have fibrocystic breast and has had biopsies in the past.  She plans to mastectomy with reconstruction which might be a fair option.    Continue to monitor pain in the left upper arm.  Discussed discussed with her at last visit.  Follow-up if worsening or not improving.    I reviewed the biopsy reports and imaging studies..  I reviewed surgeons discussion with patient.    Gisselle Beal is 35 y.o. and presents to clinic today for the following health issues   HPI   Patient is here today for follow-up.  She has recently been diagnosed with breast CA and wants to go over options available to her as discussed by  Surgeon  She desires to pursue genetic testing.  Her main concern today is her age.  Her documented age is incorrect.  She is postmenopausal.  LMP was 1 year ago.    She does continue to have discomfort to her site of Covid shot.  She is able to do her ADLs.  She plans to get her second shot on other arm      Review of Systems  Constitutional: Negative  Respiratory: Negative  Cardiac: Negative      Objective    /60   Pulse 87   Temp 97.5  F (36.4  C) (Oral)   Ht 5' 3.25\" (1.607 m)   Wt 144 lb (65.3 kg)   BMI 25.31 kg/m    Body mass index is 25.31 kg/m .  Physical Exam  GENERAL: Healthy, alert and no distress  EYES: Eyes grossly normal to " inspection. No discharge or erythema, or obvious scleral/conjunctival abnormalities.  RESP: No audible wheeze, cough, or visible cyanosis.  No visible retractions or increased work of breathing.    NEURO: Cranial nerves grossly intact. Mentation and speech appropriate for age.  PSYCH: Mentation appears normal, affect normal/bright, judgement and insight intact, normal speech and appearance well-groomed

## 2021-06-15 NOTE — TELEPHONE ENCOUNTER
Forms Request  Name of form/paperwork: Other:  Attending Physicians Statement  Have you been seen for this request: Last saw Dr Peterson on 01.28.21  Do we have the form: Yes- form was faxed into the clinic, I put in out guide and gave to Janny.  When is form needed by: N/A  How would you like the form returned: Fax:  to [employer] at [phone]  Patient Notified form requests are processed in 3-5 business days: No    Okay to leave a detailed message? Yes

## 2021-06-15 NOTE — INTERVAL H&P NOTE
I have performed an assessment and examined the patient, as necessary, to update the patient's current status that may have changed since the prior History and Physical.  The History & Physical has been reviewed and no updates are needed.

## 2021-06-15 NOTE — PROGRESS NOTES
Xiao presents to [facility] for a post op appointment with Dr. Kent .  She is accompanied by her daughter for appointment.  She states she is doing well, minimal pain.  She has good ROM, reviewed ROM exercises with her.  Patient met with Dr. Kent .  See dictation for details of visit.  She will plan to be referred onto Medical Oncology at the [facility], per her request, and will plan to follow up with Dr. Kent  in one year with bilateral mammograms.  Invited calls sooner if she has any questions.  RN time 12 mins

## 2021-06-15 NOTE — ANESTHESIA POSTPROCEDURE EVALUATION
Patient: Xiao Beal  Procedure(s):  Left Lumpectomy; [place] Lymph Node Biopsy (Left)  Anesthesia type: MAC    Patient location: PACU  Last vitals:   Vitals Value Taken Time   /58 02/19/21 1400   Temp 36.5  C (97.7  F) 02/19/21 1312   Pulse 103 02/19/21 1415   Resp 14 02/19/21 1312   SpO2 99 % 02/19/21 1415   Vitals shown include unvalidated device data.  Post vital signs: stable  Level of consciousness: awake and responds to simple questions  Post-anesthesia pain: pain controlled  Post-anesthesia nausea and vomiting: no  Pulmonary: unassisted, return to baseline  Cardiovascular: stable and blood pressure at baseline  Hydration: adequate  Anesthetic events: no    QCDR Measures:  ASA# 11 - [place]-op Cardiac Arrest: ASA11B - Patient did NOT experience unanticipated cardiac arrest  ASA# 12 - Evangelina-op Mortality Rate: ASA12B - Patient did NOT die  ASA# 13 - PACU Re-Intubation Rate: ASA13B - Patient did NOT require a new airway mgmt  ASA# 10 - Composite Anes Safety: ASA10A - No serious adverse event    Additional Notes:

## 2021-06-15 NOTE — PROGRESS NOTES
Per Dr. Kent's  request, order for Oncotype submitted through [employer] [facility] online portal.

## 2021-06-15 NOTE — TELEPHONE ENCOUNTER
I have sent patient a my chart message.  I tried calling again in the evening and we keep missing phone calls.  Please help schedule a telephone visit for this patient on Thursday afternoon.    This will facilitate any questions or concerns that you may have.    Please do inform patient that I have sent a detailed [employer] message that explains about her colonoscopy results.    Emmanuel Peterson MD  3/1/2021

## 2021-06-15 NOTE — PROGRESS NOTES
"2/5/2021     Xiao Beal is a 35 y.o. female who is being evaluated via a billable telephone visit.      The patient has been notified of following:     \"This telephone visit will be conducted via a call between you and your physician/provider. We have found that certain health care needs can be provided without the need for a physical exam.  This service lets us provide the care you need with a short phone conversation.  If a prescription is necessary we can send it directly to your pharmacy.  If lab work is needed we can place an order for that and you can then stop by our lab to have the test done at a later time.    Telephone visits are billed at different rates depending on your insurance coverage. During this emergency period, for some insurers they may be billed the same as an in-person visit.  Please reach out to your insurance provider with any questions.    If during the course of the call the physician/provider feels a telephone visit is not appropriate, you will not be charged for this service.\"    Patient has given verbal consent to a Telephone visit? Yes    What phone number would you like to be contacted at? [phone]    Patient would like to receive their AVS by AVS Preference: Juan.    Referring Provider: Theresa Randall DO    Present for Today's Visit: Xiao    Presenting Information:   I spoke with Xiao Beal over the phone today for genetic counseling to discuss her recent diagnosis of breast cancer.  She is here today to review this history, cancer screening recommendations, and available genetic testing options.    Personal History:  Xiao is a 35 y.o. female. (Patient state that her birth date is entered incorrectly in our system; she is actually 46-50 years old; reports that she is 49, but based on her reported age at first birth and her first child's reported age, she would he 45). She was recently diagnosed with a left breast cancer picked up by the patient. Biopsy completed on " 1/21/2021 revealed invasive ductal carcinoma; ER positive, HI weakly positive (1-5%), and HER2 negative. She also had two areas in her right breast biopsied that both revealed fibroadenomas. She has met with Dr. Randall to discuss surgical options. Xiao stated that the results from genetic testing will help determine whether bilateral mastectomy is the right surgical option for her.      She had her first menstrual period at age 14, her first child at age 29, and is postmenopausal.  Xiao has her ovaries, fallopian tubes and uterus in place, and she has had no ovarian cancer screening to date.  She reports no history of oral contraceptive use and that she has never been on hormone replacement therapy.      Her most recent OB-GYN exam and Pap smear in 2018 were normal. She reports that she had her first colonoscopy given her age just earlier this week that resulted in the removal of some polyps. These records are not yet available as it sounds like pathology still may be pending. She does not regularly do any other cancer screening at this time.  Xiao reported no tobacco use, and no alcohol use.    Family History: Xiao's family history is significant for the following (Please see scanned pedigree for detailed family history information)  Children/Siblings    Xiao has two daughters, ages 15 and 16, both reportedly healthy.     Xiao has two sisters, ages 48 and 53, and two brothers, ages 42 and 45; all healthy with no reported cancer history.   Maternal    Xiao's mother, age 71, is reportedly healthy with no cancer history.     No known cancer history reported in her five maternal aunts, two maternal uncles, any of her maternal first-cousins, or either of her maternal grandparents. She dies reports that two of her maternal aunts and one maternal uncle passed from stomach issues that may have been cancers, but she is unsure.   Paternal    Xiao's father passed away at age 65 from lung cancer. He was a smoker.      No cancer history reported in two paternal aunts or any of her paternal first-cousins. She reports that she has no information regarding her paternal grandparents' health histories.       Her maternal ethnicity is [place]. Her paternal ethnicity is [place].  There is no known Ashkenazi Buddhist ancestry on either side of her family. There is no reported consanguinity.    Discussion:    Xiao's family history of breast cancer is may be suggestive of a hereditary cancer syndrome.    We reviewed the features of sporadic, familial, and hereditary cancers. In looking at Xiao's family history, it is possible that a cancer susceptibility gene is present due to her age at diagnosis (46-50 years).      We discussed the natural history and genetics of hereditary breast cancers. A detailed handout regarding the information we discussed will be sent to Xiao via [employer]. Topics included: inheritance pattern, cancer risks, cancer screening recommendations, and also risks, benefits and limitations of testing.    We reviewed that the most common cause of hereditary breast cancer is Hereditary Breast and Ovarian Cancer (HBOC) syndrome, which is caused by mutations in the genes BRCA1 and BRCA2. BRCA1 and BRCA2 are two genes that increase the risk for breast and ovarian cancers, among others. Women who inherit a BRCA mutation have a 50 to 85% lifetime risk of breast cancer and up to a 58% lifetime risk of ovarian cancer. This is higher than the general population lifetime risks of 12% for breast cancer and less than 2% for ovarian cancer. Men with BRCA gene mutations have up to a 7% risk of breast cancer and 20% risk of prostate cancer. Other cancers, such as pancreatic cancer and melanoma, have also been associated with BRCA mutations.    We discussed that insurance coverage for genetic testing is typically dependent on a personal or family history meeting criteria for genetic testing for a hereditary cancer syndrome such as  BRCA1/2. Currently, based on Xiao's stated age (46-50y), Xiao's personal and family history does not clearly meet criteria for genetic testing for a specific hereditary cancer syndrome. We discussed options for proceeding with genetic testing including the insurance process. We also discussed patient pay options through the laboratories. An argument could be made that she has very limited information regarding her paternal family history which is considered in the National [facility] guidelines for genetic testing for high-penetrance breast and/or ovarian cancer susceptibility genes for patient diagnosed with breast cancer between ages 46-50y. Xiao expressed understanding, and after our discussion, Xiao was comfortable proceeding with genetic testing submitting to insurance.     Additionally, according to the American Society of Breast Surgeons Consensus Guideline on Genetic Testing for Hereditary Breast Cancer, genetic testing should be available to all patients who have been diagnosed with breast cancer.    We discussed that there are additional genes that could cause increased risk for breast cancer. As many of these genes present with overlapping features in a family and accurate cancer risk cannot always be established based upon the pedigree analysis alone, it would be reasonable for Xiao to consider panel genetic testing to analyze multiple genes at once.    We reviewed genetic testing options for hereditary breast and gynecologic cancers: actionable high/moderate breast and gynecologic cancer risk custom panel (CustomNext-Cancer, 19 genes, a combination of BRCANext +  STK11) and expanded high and moderate risk panel (BRCANext-Expanded, 23 genes). Xiao expressed an interest in learning as much information as possible from the testing. She opted for the BRCANext-Expanded panel.  Genetic testing is available for 23 genes associated with hereditary gynecologic, breast, and related cancers:  BRCANext-Expanded (EZIO, BARD1, BRCA1, BRCA2, BRIP1, CDH1, CHEK2, DICER1, EPCAM, MLH1, MSH2, MSH6, NBN, NF1, PALB2, PMS2, PTEN, RAD51C, RAD51D, RECQL, SMARCA4, STK11, TP53).  We discussed that some of the genes in the BRCANext-Expanded panel are associated with specific hereditary cancer syndromes and published management guidelines: Hereditary Breast and Ovarian Cancer syndrome (BRCA1, BRCA2), Hardin syndrome (MLH1, MSH2, MSH6, PMS2, EPCAM), Hereditary Diffuse Gastric Cancer (CDH1), Cowden syndrome (PTEN), Li Fraumeni syndrome (TP53), Peutz-Jeghers syndrome (STK11), and Neurofibromatosis type 1 (NF1).    Risk-reducing salpingo-oophorectomy can be considered in women with mutations in BRIP1, RAD51C, or RAD51D. Breast and/or other cancer risk management guidelines are available for EZIO, CHEK2, PALB2, NF1, and NBN.  The remaining genes (BARD1, DICER1, RECQL, and SMARCA4) are associated with increased cancer risk and may allow us to make medical recommendations when mutations are identified.      Consent was obtained over the phone with no witness required due to the current covid19 global pandemic.    Medical Management: For Xiao, we reviewed that the information from genetic testing may determine:    surgery to treat Xiao's active cancer diagnosis (i.e. lumpectomy versus bilateral mastectomy, partial versus total colectomy, etc.),    additional cancer screening for which Xiao may qualify (i.e. mammogram and breast MRI, more frequent colonoscopies, more frequent dermatologic exams, etc.),    options for risk reducing surgeries Xiao could consider (i.e. bilateral mastectomy, surgery to remove her ovaries and/or uterus, etc.),      and targeted chemotherapies for Xiao's active cancer, or if she were to develop certain cancers in the future (i.e. immunotherapy for individuals with Hardin syndrome, PARP inhibitors, etc.).     These recommendations and possible targeted chemotherapies will be discussed in detail once  genetic testing is completed.     We discussed that Xiao's surgical decisions are pending genetic testing results. For that reason, I will place a STAT request on the BRCAPlus genes (EZIO, BRCA1, BRCA2, CDH1, CHEK2, PALB2, PTEN, and TP53) in order to get these results back as soon as possible.       Plan:  1) Today iXao elected to proceed with BRCANext-Expanded genetic testing (23 genes) through [employer].  2) A STAT request was placed on the BRCAPlus genes (including BRCA1 and BRCA2) and we will get these results back in about 2 weeks. I will call Xiao with these results as soon as they are available.     Time spent over the phone: 54 minutes     Fidelina Poe MS, [facility]  Licensed Genetic Counselor  [facility]  [phone]

## 2021-06-15 NOTE — TELEPHONE ENCOUNTER
I spoke with patient and relayed Dr Peterson's message below and scheduled her a telephone visit for 03.04.21 at 1:50PM with Dr Peterson.

## 2021-06-15 NOTE — TELEPHONE ENCOUNTER
Patient had Colonoscopy 2/2/2021 at [facility] in [place] and would like to talk to Dr. Peterson regarding the results.  Please call patient back at [phone]

## 2021-06-15 NOTE — TELEPHONE ENCOUNTER
Pt calling to check status and if this should be done by us or by surgeon.    Pt is having surgery tomorrow, so needs to know today, please call her.

## 2021-06-15 NOTE — TELEPHONE ENCOUNTER
2/17/2021    I called Xiao with her BRCA1 and BRCA2 results today.  Xiao is NEGATIVE for mutations in the genes on the BRCAPlus panel by sequencing and deletion/duplication analysis (EZIO, BRCA1, BRCA2, CDH1, CHEK2, PALB2, PTEN, and TP53).     We rushed these genes so that she can make surgical decisions. Results for the remaining genes  (BRCANext-Expanded panel; 11 additional genes) are still pending and should be completed within the next 2-3 weeks.  Xiao will be contacted to discuss results in full as soon as the rest of the results are available.      Plan:  1. She plans to follow-up with Dr. Kent for her lumpectomy on 2/19.  2. I will call Xiao to discuss the remainder of the BRCANext-Expanded panel, once results are available.    If Xiao has any further questions, I encouraged her to contact me at [phone].    Fidelina Poe MS, [facility]  Licensed Genetic Counselor  [facility]  [phone]

## 2021-06-15 NOTE — PROGRESS NOTES
In for follow-up of her left lumpectomy  with sentinel lymph node biopsy.  She is feeling well.  She is having very minimal pain.      Physical exam:  Appears well.  Does not appear in any discomfort.  Breasts: Incisions are healing nicely with no signs of infection.  Mild swelling in the axilla and I did aspirate this for about 25 cc of serous fluid.    Pathology: The tumor was 2.8 cm.  The margins are negative.  Her sentinel lymph node is positive. 2/6 LN's are positive.    Impression: Postop visit. Doing well.  The patient's accurate age is not quite known but we know it is older than 35 and she is clinically postmenopausal so I will order an Oncotype.  This meets NCCN guidelines.  We talked about the options for oncologist and radiation oncologist and she would like to go to the [facility].  I explained that the Oncotype will be the determinant on whether or not she needs chemotherapy along with the hormone therapy or just hormone therapy alone.  She definitely needs radiation.    Plan: We'll refer her onto the [facility].  Follow-up with me in 1 year with bilateral mammograms.

## 2021-06-15 NOTE — TELEPHONE ENCOUNTER
Forms Request  Name of form/paperwork: [employer]  Have you been seen for this request: Yes:  01/11/21  Do we have the form: Yes- placed in Dr. Peterson's inbasket  When is form needed by: ASAP  How would you like the form returned:   Patient Notified form requests are processed in 3-5 business days: Yes    Okay to leave a detailed message? Yes

## 2021-06-15 NOTE — PROGRESS NOTES
Xiao Beal is a 36 y.o. female who is being evaluated via a billable telephone visit.      What phone number would you like to be contacted at? [phone]  How would you like to obtain your AVS? AVS Preference: Juan.    Assessment & Plan     1. Malignant neoplasm of upper-outer quadrant of left breast in female, estrogen receptor positive (H)     2. Screen for colon cancer     3. History of colonic polyps         Medical decision making: patient is is a 49+-year-old female (noted discrepancy in documented age) with new diagnosis of breast cancer, status post biopsy and lumpectomy also had screening colonoscopy done per my advice.  She informs me that she did get billed for a diagnostic colonoscopy.  This should be considered an erroneous.  She did not have any signs and symptoms of colon cancer.  Screening colonoscopy was ordered noting her real age.  Discussed her biopsy result.  Her Oncotype being is pending.  She will be following with [facility].         No follow-ups on file.    Emmanuel Peterson MD  [facility]    Subjective    Chief Complaint   Patient presents with     Follow-up     General follow up        Xiao Beal is 36 y.o. and presents today for the following health issues   HPI patient presents today for follow-up of her colonoscopy.  She would like to explain to me the results.  She is also wondering why she is being billed for diagnostic colonoscopy.  She does not have any colon cancer concerns or issues.  Since I last seen her, patient has had partial mastectomy done with Dr. Kent.  She is now awaiting follow-up at [facility].  She is recovering well from the surgery.  She would like to discuss her follow-up plan with me today      Review of Systems  She does not report any constitutional, respiratory or cardiac symptoms      Objective       Vitals:  No vitals were obtained today due to virtual visit.    Physical Exam  Speech and  mentation is normal    Phone call duration: 17 minutes

## 2021-06-15 NOTE — TELEPHONE ENCOUNTER
Forms filled out as much as possible and put in Dr. MOSES's inbox on desk to be completed and signed.

## 2021-06-15 NOTE — TELEPHONE ENCOUNTER
Attempted to call pt back but no answer and VM full. Please relay MD message below when pt calls back

## 2021-06-15 NOTE — TELEPHONE ENCOUNTER
Called patient and left a voice message for her.  I also sent her a detailed MyChart message    Emmanuel Peterson MD  3/1/2021

## 2021-06-15 NOTE — H&P (VIEW-ONLY)
This is a 35 y.o.  Female (who is likely older than her stated age), who I'm asked to see by Emmanuel Peterson MD for a second opinion regarding the treatment of a left breast cancer.  This was picked up by the patient.  She noticed an indentation or dimpling of her breast several months ago.  She brought it up to her physician when she was seen because of neck and arm pain after a Covid vaccine.  She underwent a mammogram and ultrasound.  A needle biopsy was done which shows an invasive ductal carcinoma.  It is estrogen receptor positive, progesterone receptor positive and HER-2 negative.    She has no family history of breast cancer.      PAST MEDICAL HISTORY:  No past medical history on file.  Past Surgical History:   Procedure Laterality Date     BREAST BIOPSY Left     benign     BREAST BIOPSY Right     benign     [facility]  DELIVERY ONLY      Description:  Section;  Recorded: 2012;     US BIOPSY CORE LYMPH NODE (BREAST) LEFT Left 2021     US BREAST CORE BIOPSY LEFT Left 2021     US BREAST CORE BIOPSY RIGHT Right 2021     US BREAST CORE BIOPSY RIGHT Right 2021       Medications:    Current Outpatient Medications:      acetaminophen (TYLENOL) 325 MG tablet, Take 650 mg by mouth every 6 (six) hours as needed for pain., Disp: , Rfl:      ibuprofen (ADVIL,MOTRIN) 200 MG tablet, Take 2 tablets (400 mg total) by mouth every 6 (six) hours as needed for pain., Disp: 30 tablet, Rfl: 0    Allergies:  No Known Allergies    Social History:   reports that she has never smoked. She has never used smokeless tobacco. She reports that she does not drink alcohol or use drugs.    ROS:  A 12 point comprehensive review of systems was negative except as noted.    Physical Exam  There were no vitals taken for this visit.  General:alert, appears stated age and cooperative  Lungs:clear to auscultation bilaterally  CV:regular rate and rhythm, S1, S2 normal, no murmur, click, rub or  gallop  Breasts: Clearly palpable mass in the upper outer quadrant of the left breast.  The skin is somewhat tethered to the mass.  Lymph Nodes: No axillary nodes palpated  Neuro:Grossly normal  Musculoskeletal: Normal range of motion of her upper extremities.  Skin: Normal skin turgor.    Reviewed her mammograms and ultrasound and pathology.     Impression: Left Breast Cancer. Clinically T1, N0.  Discussed the surgical options for treatment of breast cancer which generally are a lumpectomy (partial mastectomy) combined with radiation versus a mastectomy.  Explained that the survival benefit is the same for both.  The difference is in local recurrence risk.  The patient is a Good candidate for a lumpectomy.  I actually tend to recommend a lumpectomy because even with a mastectomy would have to remove this area of the skin over the mass.  Discussed SLN biopsy.  The procedure and rationale were explained.  Discussed that at this point we do not know yet whether or not she will need chemotherapy and we may not know until we get all of the results of surgery back.  Likely an Oncotype will be needed to help determine whether or not chemotherapy is indicated.      Plan: We'll schedule for a left  lumpectomy with sentinel lymph node biopsy.  This is typically an outpatient procedure under local MAC anesthetic.  The risks and benefits of surgery were explained.  Also talked about expected recovery time.        Medical Decision Making:    Review of external notes as documented above       45 minutes spent on the date of the encounter doing chart review, history and exam, documentation and further activities as noted above

## 2021-06-15 NOTE — PROGRESS NOTES
This is a 35 y.o.  Female (who is likely older than her stated age), who I'm asked to see by Emmanuel Peterson MD for a second opinion regarding the treatment of a left breast cancer.  This was picked up by the patient.  She noticed an indentation or dimpling of her breast several months ago.  She brought it up to her physician when she was seen because of neck and arm pain after a Covid vaccine.  She underwent a mammogram and ultrasound.  A needle biopsy was done which shows an invasive ductal carcinoma.  It is estrogen receptor positive, progesterone receptor positive and HER-2 negative.    She has no family history of breast cancer.      PAST MEDICAL HISTORY:  No past medical history on file.  Past Surgical History:   Procedure Laterality Date     BREAST BIOPSY Left     benign     BREAST BIOPSY Right     benign     [facility]  DELIVERY ONLY      Description:  Section;  Recorded: 2012;     US BIOPSY CORE LYMPH NODE (BREAST) LEFT Left 2021     US BREAST CORE BIOPSY LEFT Left 2021     US BREAST CORE BIOPSY RIGHT Right 2021     US BREAST CORE BIOPSY RIGHT Right 2021       Medications:    Current Outpatient Medications:      acetaminophen (TYLENOL) 325 MG tablet, Take 650 mg by mouth every 6 (six) hours as needed for pain., Disp: , Rfl:      ibuprofen (ADVIL,MOTRIN) 200 MG tablet, Take 2 tablets (400 mg total) by mouth every 6 (six) hours as needed for pain., Disp: 30 tablet, Rfl: 0    Allergies:  No Known Allergies    Social History:   reports that she has never smoked. She has never used smokeless tobacco. She reports that she does not drink alcohol or use drugs.    ROS:  A 12 point comprehensive review of systems was negative except as noted.    Physical Exam  There were no vitals taken for this visit.  General:alert, appears stated age and cooperative  Lungs:clear to auscultation bilaterally  CV:regular rate and rhythm, S1, S2 normal, no murmur, click, rub or  gallop  Breasts: Clearly palpable mass in the upper outer quadrant of the left breast.  The skin is somewhat tethered to the mass.  Lymph Nodes: No axillary nodes palpated  Neuro:Grossly normal  Musculoskeletal: Normal range of motion of her upper extremities.  Skin: Normal skin turgor.    Reviewed her mammograms and ultrasound and pathology.     Impression: Left Breast Cancer. Clinically T1, N0.  Discussed the surgical options for treatment of breast cancer which generally are a lumpectomy (partial mastectomy) combined with radiation versus a mastectomy.  Explained that the survival benefit is the same for both.  The difference is in local recurrence risk.  The patient is a Good candidate for a lumpectomy.  I actually tend to recommend a lumpectomy because even with a mastectomy would have to remove this area of the skin over the mass.  Discussed SLN biopsy.  The procedure and rationale were explained.  Discussed that at this point we do not know yet whether or not she will need chemotherapy and we may not know until we get all of the results of surgery back.  Likely an Oncotype will be needed to help determine whether or not chemotherapy is indicated.      Plan: We'll schedule for a left  lumpectomy with sentinel lymph node biopsy.  This is typically an outpatient procedure under local MAC anesthetic.  The risks and benefits of surgery were explained.  Also talked about expected recovery time.        Medical Decision Making:    Review of external notes as documented above       45 minutes spent on the date of the encounter doing chart review, history and exam, documentation and further activities as noted above

## 2021-06-15 NOTE — ANESTHESIA CARE TRANSFER NOTE
Last vitals:   Vitals:    02/19/21 1312   BP: (P) 118/57   Pulse: (P) 91   Resp: (P) 14   Temp: (P) 36.5  C (97.7  F)   SpO2: (P) 100%     Patient's level of consciousness is drowsy  Spontaneous respirations: yes  Maintains airway independently: yes  Dentition unchanged: yes  Oropharynx: oropharynx clear of all foreign objects    QCDR Measures:  ASA# 20 - Surgical Safety Checklist: WHO surgical safety checklist completed prior to induction    PQRS# 430 - Adult PONV Prevention: 4558F - Pt received => 2 anti-emetic agents (different classes) preop & intraop  ASA# 8 - Peds PONV Prevention: NA - Not pediatric patient, not GA or 2 or more risk factors NOT present  PQRS# 424 - Evangelina-op Temp Management: 4559F - At least one body temp DOCUMENTED => 35.5C or 95.9F within required timeframe  PQRS# 426 - PACU Transfer Protocol: - Transfer of care checklist used  ASA# 14 - Acute Post-op Pain: ASA14B - Patient did NOT experience pain >= 7 out of 10

## 2021-06-15 NOTE — TELEPHONE ENCOUNTER
Please inform patient that this form should be completed by the surgeon.  This is due to the fact that I am not aware about restrictions for her work    Emmanuel Peterson MD  2/24/2021

## 2021-06-15 NOTE — ANESTHESIA PREPROCEDURE EVALUATION
Anesthesia Evaluation      Patient summary reviewed   No history of anesthetic complications     Airway   Mallampati: II  Neck ROM: full   Pulmonary - negative ROS and normal exam    breath sounds clear to auscultation                         Cardiovascular - negative ROS and normal exam  Rhythm: regular  Rate: normal,         Neuro/Psych      Comments: Migraine headaches     Endo/Other       Comments: Breast Cancer     GI/Hepatic/Renal - negative ROS           Dental - normal exam                        Anesthesia Plan  Planned anesthetic: MAC    ASA 3     Anesthetic plan and risks discussed with: patient  Anesthesia plan special considerations: antiemetics,   Post-op plan: routine recovery

## 2021-06-15 NOTE — TELEPHONE ENCOUNTER
Please inform patient that this form should be done by the surgeon ideally.    Emmanuel Peterson MD  2/18/2021

## 2021-06-15 NOTE — OP NOTE
Operative Note    Name:  Xiao Beal  PCP:  Emmanuel Peterson MD  Procedure Date:  2/19/2021      Procedure:  Left Lumpectomy; [place] Lymph Node Biopsy (Left)    Pre-Procedure Diagnosis:  Invasive ductal carcinoma of breast, left (H) [C50.912]     Post-Procedure Diagnosis:    Same     Surgeon(s):  Desire Kent MD    Assistant:      Anesthesia Type:  MAC      Findings:  Several suspicious LN's.    Operative Report:    The patient was brought to the operating suite where she was placed in the supine position.  She is prepped and draped in a sterile fashion.  After infiltration with a combination of 1% lidocaine and quarter percent Marcaine an elliptical shaped incision was made over the mass in the upper outer left breast.  This was carried down through the subcutaneous tissues and then  around the mass  widely with electrocautery.  The dissection did bring me down to chest wall.  Once removed the specimen was marked and sent to  pathology for permanent sectioning.  The lateral portion of this incision was just over the lower edge of the axilla so I dissected from here up into the axilla rather than making a second incision.  Using the gamma probe I identified 2 sentinel lymph node(s).  Before even dissecting them free I could feel that these lymph nodes were grossly very firm and highly suspicious.  There is one additional lymph nodes that also felt suspicious so I proceeded with a limited axillary lymph node dissection.  Level 1 and 2 lymph nodes were removed.  I did not dissect all the way up to the axillary vein as there was nothing suspicious high up.  Branches of the axillary vein and artery were clipped with hemoclips.  The long thoracic nerve and the thoracodorsal bundle were left intact.  The wound was inspected for hemostasis and closed with 3-0 Vicryl to the subcutaneous tissues and a subcuticular stitch of 4-0 Monocryl to the skin.  Dressings were placed.  The patient tolerated the procedure  well.      Estimated Blood Loss:   5 mL from 2/19/2021 12:11 PM to 2/19/2021  1:11 PM    Specimens:    ID Type Source Tests Collected by Time   A :  Tissue Breast, Lumpectomy, Left SURGICAL PATHOLOGY EXAM Desire Kent MD 2/19/2021 1236   B :  Tissue Lymph Node(s), Axillary, Left SURGICAL PATHOLOGY EXAM Desire Kent MD 2/19/2021 1251          Drains:        Complications:    None    Desire Kent     Date: 2/19/2021  Time: 1:11 PM

## 2021-06-15 NOTE — TELEPHONE ENCOUNTER
Patient notified of her Oncotype Results.  Will fax to Dr. Torres for her appointment later this week. Fax: [phone].  LMOM for patient.

## 2021-06-15 NOTE — PROGRESS NOTES
Xiao presents to [facility] for a surgical consult with Dr. Kent  regarding her newly diagnosed left breast cancer.  This is a second opinion.   RN assessment and EMR update.    She met with Dr. Kent  see dictation for details of visit. She will plan a wire loc lumpectomy with sentinel node biopsy .  Pre and post op teaching complete.  Walked her to [facility] for surgery scheduling.  Support provided, invited calls.  RN time 18 mins

## 2021-06-16 NOTE — PROGRESS NOTES
Pt here ambulatory by herself for consult with Dr. Espinoza for recent dx of breast cancer. She states she's healing up well since surgery, still tight in the axilla and we discussed importance of good ROM. She will continue ROM exercises daily. VSS. She does c/o some constriction in her breathing, especially when she falls deep asleep, it wakes her up. MD was made aware of pt's concerns. I gave her a new patient RT folder and we discussed the routine for RT including today's consult, simulation CT/tx planning/tx positioning, daily treatment, weekly RO visit, and common s/e of skin irritation and fatigue. She verbalized her understanding.     LMP at least 2 years ago, no UPT prior to SIM per Dr. Espinoza.     Pt had irregular heartbeat at RO consult, Dr. Espinoza ordered EKG and referral to Cardiology.

## 2021-06-16 NOTE — TELEPHONE ENCOUNTER
Pt calling requesting immunization records to be faxed to [facility].  Immunization record faxed to [facility]. Fax: [phone]

## 2021-06-16 NOTE — TELEPHONE ENCOUNTER
Flory telephoned to clarify the date and location for ECG.  Details explained and she verbalized understanding of the appointment on 4/1 at 8:30am in Radiology/Heart Care in [facility].

## 2021-06-16 NOTE — TELEPHONE ENCOUNTER
I received form on my desk, I will fill out as much as possible and get a signature tomorrow from Dr. MOSES when she is back in clinic and then contact pt when completed.

## 2021-06-16 NOTE — PROCEDURES
Procedures  Clinical Treatment Planning Note    The complex radiotherapy planning will be completed for the patient to plan the treatment for her breast cancer.  The patient had a planning CT earlier today for planning.  The treatment aids were used for planning, including headrest and Wing Board to help keep the same position during the daily radiation therapy.  The therapy planning is necessary to reduce radiotherapy dose to the normal critical organs which are not possible with simple treatment.  In addition, dose to the target and the critical structures requires three-dimensional analysis of the isodose distribution.  The planning will be done to reduce dose to the lungs, spinal cord, liver, and heart.     I will contour the clinical tumor volume  CTV , with expanded volume of planning treatment volume  PTV  on the treatment planning system.  The critical structures will be outlined, including spinal cord, lungs, heart, liver, bone and soft tissue.     Treatment planning will be done on the computer treatment planning system.  The multiple fields will be used to achieve optimal coverage of the target volume.  Dose distribution to the above critical structures will be reviewed.  Isodose distribution along with the X, Y, Z plan will also be reviewed.  Custom blocking will be used to shield normal structures.  The beam s eye views will be reviewed and the digital reconstructed image will be reviewed on the planning software.      The patient will receive 5040 cGy in 28 treatments targeted to the left chest/breast and regional lymph nodes using 6-MV or 10-MV photons.  An additional 1000 cGy in 5 fractions will be planned to give to the primary tumor bed using electron.      Iza Espinoza MD, PhD  Department of Radiation Oncology   [facility]  Tel: [phone]  Page: [phone]    [facility]  [address] [address]     [place] [id number] [place] [address]

## 2021-06-16 NOTE — TELEPHONE ENCOUNTER
Pt called with questions about her tx course, wondering why it's 33 treatments rather than a short course she expected, I explained the best I could but she will want to ask Dr. Espinoza on Monday during her tx start. She was questioning if the lump was fully removed, and LN involvement. I explained the lump was removed with negative margins and 2/6 LN removed showed cancer and that is part of the reason we do radiation. She will question Dr. Espinoza more on Monday. She's also questioning if her insurance prior auth was done and if she's set from an insurance standpoint to start RT Monday. I told her our team does check that prior and she would get a call if there was any problem with insurance.

## 2021-06-16 NOTE — PROGRESS NOTES
RADIATION ONCOLOGY WEEKLY TREATMENT VISIT NOTE      Assessment / Impression       1. Invasive ductal carcinoma of breast, left (H)       Tolerating radiation therapy well.  All questions and concerns addressed.    Plan:     Continue radiation treatment as prescribed.    Subjective:      HPI: Xiao Beal is a 36 y.o. female with    1. Invasive ductal carcinoma of breast, left (H)         The following portions of the patient's history were reviewed and updated as appropriate: allergies, current medications, past family history, past medical history, past social history, past surgical history and problem list.    Assessment                  Body Site: Breast                           Site: Left breast  Stereotactic Radiosurgery: No  Today's Dose: 180  Total Dose for Breast: 6040  Today's Fraction/Total Fraction Breast:   Drainage: 0: Absent                                            Sexuality Alteration                 Emotional Alteration Copin: Effective  Comfort Alteration KPS: 100 % Normal, no complaints  Fatigue (ONS scale) : 1: Mild Fatigue  Pain Location: denies  Hot Flashes and/or Flushes: 1: Mild or no more than 1 per day   Nutrition Alteration Anorexia: 0: None  Nausea: 0: None  Vomitin: None  Skin Alteration Skin Sensation: 0: No problem  Skin Reaction: 0: None(Radioplex given with instruction)  AUA Assessment                                  Accompanied by       Objective:     Exam: Examination reviewed no significant changes.    Vitals:    21 0843   Weight: 144 lb 12.8 oz (65.7 kg)       Wt Readings from Last 8 Encounters:   21 144 lb 12.8 oz (65.7 kg)   21 143 lb (64.9 kg)   21 138 lb 5 oz (62.7 kg)   21 144 lb (65.3 kg)   21 142 lb (64.4 kg)   21 142 lb 6.4 oz (64.6 kg)   21 142 lb 6.4 oz (64.6 kg)   19 140 lb (63.5 kg)       General: Alert and oriented, in no acute distress  Xiao has no Erythema.  Aria chart and setup  information reviewed    Iza Espinoza MD

## 2021-06-16 NOTE — TELEPHONE ENCOUNTER
Incoming call from patient - she would like form to be faxed to [facility] fax #[phone], please use cover sheet and attention it to patient. She is at work and would prefer form to be faxed there. If there's any questions please call patient.

## 2021-06-16 NOTE — PROCEDURES
Procedures    SIMULATION NOTE:       DIAGNOSIS: Left breast cancer, pathologic stage T2 N1 aM0, ER/HI positive, HER-2 negative, Oncotype DX score 17, status post lumpectomy and sentinel lymph node/limited axillary lymph node resection with negative margin.  2/6 removed axillary lymph nodes showed evidence of metastatic disease with largest metastatic focus measured 9 x 5 mm with extensive ERIC up to 6 mm in distance.     INDICATION:  Postoperative radiation therapy is recommended for patient as second part of the breast preservation protocol to further reduce the likelihood of cancer recurrence.    CONSENT:  The possible risks and the side effects of radiation therapy have been discussed with patient in detail and at great length.  Questions are answered to patient's satisfaction.  Written consent was obtained.    SIMULATION:  The patient is in a supine position with a wing breast board to help keep the same position during the daily radiation therapy.  Tentative isocenter is set up in the center of the thoracic region.  We will acquire CT information to help us to better locate target and design radiation therapy field.    We are also going to obtain 4-D CT and use respiratory gating (or breath holdidng) technique to help us to reduce the radiation dose to the heart and lung.        BLOCKS:  Custom blocks will be drawn to minimize radiation to normal tissues and to protect normal organs including, but not limited to, lungs, heart, liver, bone and soft tissue.    DOSAGE:  I plan to give her radiation therapy at 180 cGy each fraction to a total of 5040 cGy in 28 treatments targeted to the left breast and regional lymph nodes using a 4 field.  An additional 1000 cGy in 5 fractions will be given to the primary tumor bed using an electron. I will consider to use 3D conformer technique to help us better locate target and to protect normal tissue.        Iza Espinoza MD, PhD  Department of Radiation Oncology   [facility]cer Care  Tel: [phone]  Page: [phone]    [facility]  [address]  [place] [address]     [place] [address]   [place] [address]

## 2021-06-16 NOTE — TELEPHONE ENCOUNTER
New Patient Oncology Nurse Navigator Note     Referring provider: Dr. Cristy Kenny     Referring Clinic/Organization: [facility]      Referred to (specialty): Radiation oncology    Requested provider (if applicable): N/A     Date Referral Received: 3/23/2021     Evaluation for : Breast cancer     Clinical History (per Nurse review of records provided):    An indentation or dimpling was noted by the patient  several months ago  and primary care provider became aware in January 2021 and ordered diagnostic bilateral mammogram.     1/20/21 Diagnostic mammogram and ultrasound performed for indication of lump in left breast upper outer quadrant. IMPRESSION:   1.  Palpable mass in the left breast is highly suspicious for malignancy. Ultrasound-guided core biopsy and surgical consultation is recommended.   2.  Mass in the right breast in the 8:00 position likely represents benign fibroadenoma. However, ultrasound-guided core biopsy is recommended to confirm this given its size.   3.  Hypoechoic lesion in the right breast in the 6:00 position is indeterminate and ultrasound-guided aspiration or biopsy of this lesion is recommended.    1/21/21 US-guided needle core biopsy performed on 1) left breast 1 o clock, 9 cm from nipple showing IDC, Grade 1, ER/ND positive (>95%), ND weakly positive (1-5%) and HER2 negative.    2) US-guided needle core biopsy of left axillary lymph node showed lymph node tissue with reactive lymphoid hyperplasia, but negative for malignancy  3) US-guided needle core biopsy of a lump in right breast 6 o clock, 5 cm from nipple revealed a fibroadenoma, negative for carcinoma  4) US-guided needle core biopsy of a lump in right breast 8 o clock, 6 cm from nipple revealed a fibroadenoma, usual to florid ductal hyperplasia and sclerosing adenosis with microcalcifications, also negative for malignancy.      2/19/21 Dr. Desire Kent performed left lumpectomy with sentinel lymph node  biopsy.  This revealed IDC with focal micropapillary pattern, Grade II, 2.8 cm, margins uninvolved, 3mm from inferior margin, 5 mm from superior and posterior margins.  DCIS (EIC negative), intermediate grade was also identified and size was approximately 10%.  Margins also uninvolved.  Two of the total six lymph nodes dissected were positive for metastatic carcinoma.  Extensive extranodal involvement was shown measuring up to 6 mm in greatest dimension.     Dr. Kent ordered and Oncotype and that reported with recurrence score of 17.    Patient was referred for genetic counseling and met with Fidelina Poe, genetic counselor, on 2/4/21.  Please see Fidelina Poe full note from 3/23/21 with results of genetic testing.      Patient saw Dr. Cristy Kenny of [facility] on 3/18/21 and chemotherapy was not recommended but adjuvant endocrine therapy is suggested with AI for ten years.  Dr. Kenny ordered DEXA scan (WHEN/WHERE?), bloodwork to confirm menopausal status with estradiol <11 pg/mL and FSH 59.8 IU/L.  Patient is to return to clinic to visit with Dr. Kenny in 6-8 weeks to discuss starting aromatase inhibitor.         Clinical Assessment / Barriers to Care (Per Nurse): Patient is older than her age likely in her 50s based on immigration.    Records Location (Care Everywhere, Media, etc.): Care Everywhere     Records Needed: N/A     Additional testing needed prior to consult: DEXA scan has been ordered by Dr. Kenny    3/23/21 16:10 Telephoned and left voice message for patient requesting call back to assist in scheduling radiation oncology consult.    3/24/21 13:45 - Xiao returned call and consult created with Dr. Espinoza for 3/25 at 11:00am 10:30 arrival.  Welcome letter and Patient History form will be emailed to patient today.  Sharita Lieberman RN

## 2021-06-16 NOTE — CONSULTS
Consults   [facility] Radiation Oncology Consult Note     Patient: Xiao Beal  MRN: [id number]  Date of Service: 03/25/2021          Cristy Kenny MD  [address] [facility] [address]  [place] [address]       Dear Dr. Kenny:    Thank you very much for referring this patient for consideration of radiotherapy. As you know Ms. Beal is a 36 y.o. female with a diagnosis of left breast cancer, pathologic stage T2 N1 aM0, ER/UT positive, HER-2 negative, Oncotype DX score 17, status post lumpectomy and sentinel lymph node/limited axillary lymph node resection with negative margin.  2/6 removed axillary lymph nodes showed evidence of metastatic disease with largest metastatic focus measured 9 x 5 mm with extensive ERIC up to 6 mm in distance. The patient is referred to radiation oncology for evaluation and consideration of postop radiation therapy as the 2nd part of the breast preservation protocol.    HISTORY OF PRESENT ILLNESS:   Ms. Beal is a 36 y.o. female who has been in her usual state of health until recently. She noticed an indentation or dimpling of her breast several months ago.  She brought it up to her physician when she was seen because of neck and arm pain after a Covid vaccine.  Patient underwent mammogram followed by ultrasound study on 1/20/2021 which showed 11 x 13 mm abnormalities at 1 o'clock position 9 cm from nipple of the left breast highly suspicious for pregnancy.  This is a correlate with the patient's palpable abnormality.  There are 2 additional abnormal finding in the right breast most likely from benign process.  Patient underwent needle biopsy on 1/21/2021.  The pathology from right breast 6:00 and 8 o'clock position showed no evidence of carcinoma.  The left breast biopsy indeed showed invasive ductal carcinoma, ER/UT positive, HER-2 negative.  The left axillary lymph node mass biopsy is negative for metastatic carcinoma.  After discussed with the patient about various treatment options,  the patient elected proceed with breast preservation protocol as her treatment choice and underwent left lumpectomy and sentinel node resection followed by limited left axillary lymph node dissection by Dr. Kent, breast surgeon on 2/19/2021.  The final pathology reviewed 28 x 22 x 15 mm invasive ductal carcinoma, [place] grade 2 with associated DCIS.  Margins was negative with closest margin measured 3 mm for invasive at inferior margin and 5 mm for DCIS at posterior margin.  2/6 removed axillary lymph nodes showed evidence of metastatic disease with largest metastatic focus measured 9 x 5 mm with extensive ERIC and measures up to 6 mm in greatest dimension.  Postoperatively she has been recovering well.  Her Oncotype DX score is 17.  You saw her on 3/18/2021 and adjuvant hormonal therapy is recommended.  The patient is referred to radiation oncology for evaluation and consideration of postop radiation therapy to further reduce likelihood of cancer recurrence.    CHEMOTHERAPY HISTORY: Concurrent Chemotherapy: No    RADIATION THERAPY HISTORY: Prior Radiation: No    IMPLANTED CARDIAC DEVICE: none     PREGNANCY: The patient is informed not to be pregnant during the radiation therapy.  She is post menopausal.    Current Outpatient Medications   Medication Sig Dispense Refill     acetaminophen (TYLENOL) 325 MG tablet Take 650 mg by mouth every 6 (six) hours as needed for pain.       cholecalciferol, vitamin D3, (VITAMIN D3) 5,000 unit Tab Take by mouth.        docusate sodium (COLACE) 250 MG capsule Take 250 mg by mouth daily.       HYDROcodone-acetaminophen 5-325 mg per tablet Take 1 tablet by mouth every 8 (eight) hours as needed for pain.       ibuprofen (ADVIL,MOTRIN) 200 MG tablet Take 2 tablets (400 mg total) by mouth every 6 (six) hours as needed for pain. 30 tablet 0     letrozole (FEMARA) 2.5 mg tablet        No current facility-administered medications for this visit.      Past Medical History:   Diagnosis  Date     Breast cancer (H)      Past Surgical History:   Procedure Laterality Date     BREAST BIOPSY Left     benign     BREAST BIOPSY Right     benign     COLONOSCOPY       UT  DELIVERY ONLY      Description:  Section;  Recorded: 2012;     UT MASTECTOMY, PARTIAL Left 2021    Procedure: Left Lumpectomy; [place] Lymph Node Biopsy;  Surgeon: Desire Kent MD;  Location: [facility];  Service: General     US BIOPSY CORE LYMPH NODE (BREAST) LEFT Left 2021     US BREAST CORE BIOPSY LEFT Left 2021     US BREAST CORE BIOPSY RIGHT Right 2021     US BREAST CORE BIOPSY RIGHT Right 2021     US SENTINEL NODE INJECTION  2021     Patient has no known allergies.  Family History   Problem Relation Age of Onset     Diverticulitis Mother      Lung cancer Father      Social History     Socioeconomic History     Marital status:      Spouse name: Not on file     Number of children: Not on file     Years of education: Not on file     Highest education level: Not on file   Occupational History     Not on file   Social Needs     Financial resource strain: Not on file     Food insecurity     Worry: Not on file     Inability: Not on file     Transportation needs     Medical: Not on file     Non-medical: Not on file   Tobacco Use     Smoking status: Never Smoker     Smokeless tobacco: Never Used   Substance and Sexual Activity     Alcohol use: No     Drug use: No     Sexual activity: Not Currently     Partners: Male   Lifestyle     Physical activity     Days per week: Not on file     Minutes per session: Not on file     Stress: Not on file   Relationships     Social connections     Talks on phone: Not on file     Gets together: Not on file     Attends Restoration service: Not on file     Active member of club or organization: Not on file     Attends meetings of clubs or organizations: Not on file     Relationship status: Not on file     Intimate partner violence      Fear of current or ex partner: Not on file     Emotionally abused: Not on file     Physically abused: Not on file     Forced sexual activity: Not on file   Other Topics Concern     Not on file   Social History Narrative    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  She is a  and has 2 teenaged children.  Her  committed suicide in 2016.  She does have brothers and sisters and her mom in the area.        Reviewed above.    Patient is with her 2 daughters.  She feels that she is in stable phase of life.    Emmanuel Peterson MD  5/1/2019            Review of Systems:      General  General (WDL): Exceptions to WDL  Fatigue: Yes - Chronic (Greater than 3 months)  Generalized Muscle Weakness: Yes - Recent (Less than 3 months)  Weight Gain: Yes - Recent (Less than 3 months)  Night Sweats: Yes - Recent (Greater than 3 months)  EENT  ENT (WDL): All ENT elements are within defined limits  Respiratory   Respiratory (WDL): Exceptions to WDL  Non-Cardiac Chest Pain: Yes - Recent (Less than 3 months)  Cardiovascular  Cardiovascular (WDL): Exceptions to WDL  Irregular Heart Beat: Yes - Recent (Less than 3 months)  Chest Pain: Yes - Recent (Less than 3 months)  Endocrine  Endocrine (WDL): Exceptions to WDL  Hotflashes: Yes -Recent (Less than 3 months)  Gastrointestinal  Gastrointestinal (WDL): Exceptions to WDL  Heartburn: Yes - Recent (Less than 3 months)  Constipation: Yes - Recent (Less than 3 months)  Musculoskeletal  Musculoskeletal (WDL): Exceptions to WDL  Range of Motion Limitation: Yes - Recent (Less than 3 months)  Back Pain: Yes - Chronic (Greater than 3 months)  Pain interfering with walking: Yes - Recent (Less than 3 months)  Muscle pain or stiffness: Yes - Recent (Less than 3 months)  Integumentary                  Neurological  Neurological (WDL): All neurological elements are within defined limits  Dominant Hand: Right  Psychological/Emotional   Psychological/Emotional (WDL): All  "psychological/emotional elements are within defined limits  Hematological/Lymphatic  Hematological/Lymphatic (WDL): All hematological/lymphatic elements are within defined limits  Dermatologic  Dermatologic (WDL): Exceptions to WDL  Healing Incision: Yes - Recent (Less than 3 months)  Genitourinary/Reproductive  Genitourinary/Reproductive (WDL): All genitourinary/reproductive elements are within defined limits  Reproductive  Menstrual irritation or increase in discharge: No  Age at start of periods: 14  Last menstural cycle: over 2 years ago  Number of pregnancies: 2  Patient Pregnant?: No  Is the patient trying to get pregnant?: No  Is the patient on birth control: No  Pain              Currently in Pain: No/denies  Accompanied by       Imaging: Reviewed    Pathology: Reviewed    ECOG Peformance Status  ECOG Performance Status: 0  Distress Assessment Score: 3    Objective:     PHYSICAL EXAMINATION:    BP 95/57   Pulse 90   Ht 5' 3\" (1.6 m) Comment: pt reported  Wt 143 lb (64.9 kg)   SpO2 98%   BMI 25.33 kg/m      Gen: Alert, in NAD  Eyes: PERRL, EOMI, sclera anicteric  HENT     Head: NC/AT     Ears: No external auricular lesions     Nose/sinus: No rhinorrhea or epistaxis     Oropharynx: MMM, no visible oral lesions  Neck: Supple, full ROM, no LAD  Chest: The breasts and nipples are symmetrical bilaterally.  There is no evidence of nipple discharge.  There is no palpable lump or mass bilaterally in the breast, axillary, and supraclavicular region.  There is well-healed surgical scar in the left breast consistent with a recent history of surgery.  Pulm: No wheezing, stridor or respiratory distress  CV: Well-perfused, no cyanosis, no pedal edema.  She did have irregular heart rate and we will refer patient to see cardiology and get an EKG.  Abdominal: BS+, soft, nontender, nondistended, no hepatomegaly  Back: No step-offs or pain to palpation along the thoracolumbar spine  Rectal: Deferred  : " Deferred  Musculoskeletal: Normal muscle bulk and tone  Skin: Normal color and turgor  Neurologic: A/Ox3, CN II-XII intact, normal gait and station  Psychiatric: Appropriate mood and affect    Intent of Therapy: Curative  We recommend adjuvant radiation as part of her breast conserving therapy to decrease her chance of local recurrence to the single digits. ROM stretches and skin care were discussed with the patient. She understands that these maneuvers need to continue for 6 months following completion of radiation as skin and muscle fibrosis continue to form for weeks to months following completion of therapy.      Side effects that may occur during or within weeks after radiation therapy      Fatigue and general weakness    Darkening, irritation, itchiness, redness, dryness, erythema, peeling, scabbing, ulceration and contraction of the skin of the breast and chest    Swelling of the breast    Loss of armpit hair    Lung irritation    Decrease in appetite    Side effects that may occur months or years after radiation therapy      Development of another tumor or cancer    Thickening, telangiectasias (development of spider like blood vessels in the skin) and ulceration of the skin of the breast and chest    Firming, fibrosis (scar tissue), fat necrosis, and distortion of the breast    Poor healing after a trauma or surgery in the irradiated area    Nerve damage resulting in loss of arm strength and sensation    Coronary artery blockage causing angina pain or a heart attack    Lung inflammation of fibrosis causing cough, fever and shortness of breath    Fracture of the ribs    Swellingof an arm and hand    The risks, benefits and alternatives to radiation therapy were outlined with the patient. All questions were answered and a consent was signed.     Impression     Left breast cancer, pathologic stage T2 N1 aM0, ER/[place] positive, HER-2 negative, Oncotype DX score 17, status post lumpectomy and sentinel lymph  node/limited axillary lymph node resection with negative margin.  2/6 removed axillary lymph nodes showed evidence of metastatic disease with largest metastatic focus measured 9 x 5 mm with extensive ERIC up to 6 mm in distance.     Assessment & Plan:     I have personally reviewed her upcoming medical record today.  I have also reviewed her most recent radiology study including mammogram.  This is 36-year-old female with the new diagnosis of left breast cancer status post lumpectomy and lymph node dissection.  The possible treatment options for breast cancer including surgery, systemic therapy, and radiation therapy has been discussed with the patient in detail and at the great lengths.  The possible risks and side effects of radiation therapy has also been explained to the patient.  Questions are answered to patient satisfaction.  I agree the patient is good candidate and indicated for postop radiation therapy for her breast cancer.  I believe the patient can be potentially benefited by radiation therapy for local control and possible better survival.  Therefore radiation therapy is offered to the patient and she is willing to proceed being aware of potential risks and side effects involved.  She is scheduled to return to radiation oncology later on for simulation.  I plan to give her radiation therapy at 180 cGy each fraction to a total of 5040 cGy in 28 treatments targeted to the left breast and regional lymph nodes followed by additional 1000 cGy in 5 fractions given to the primary tumor bed using electron.  The patient has a irregular heart rate by today's examination.  I will refer patient to have EKG and cardiology referral for management recommendation.    Again, thank you very much for the referral and allowing me to participate in the care of this patient.  If you have any questions or concerns about this consultation, please do not hesitate to call.  I spent approximately 60 minutes today with the patient  and 80% time was used for counseling.      Sincerely,        Iza Espinoza MD, PhD  Department of Radiation Oncology   [facility]  Tel: [phone]  Page: [phone]    [facility]  [address] [place] [address]     [address] [place] [address]    CC:  Patient Care Team:  Emmanuel Peterson MD as PCP - General (Family Medicine)  Emmanuel Peterson MD as Assigned PCP  Cristy Kenny MD as Physician (Hematology)  Desire Kent MD as Physician (General Surgery)  Iza Espinoza MD as Physician (Radiation Oncology)  Sharita Lieberman RN as Oncology Nurse Navigator (Hematology and Oncology)

## 2021-06-16 NOTE — TELEPHONE ENCOUNTER
Phone carmen to Xiao to discuss her complete genetic testing results (negative for BRCANext-panel; 23 genes total). She was not available, so I left a non-detailed voicemail for her to give me call back at her earliest convenience.     Fidelina Poe MS, [facility]  Licensed Genetic Counselor  [facility]  [phone]

## 2021-06-16 NOTE — TELEPHONE ENCOUNTER
Second attempt. Phone carmen to Xiao to discuss her complete genetic testing results (negative for BRCANext-panel; 23 genes total). She was not available, and her voicemail was full, so I could not leave a message. Will call back tomorrow.     Fidelina Poe MS, [facility]  Licensed Genetic Counselor  [facility]  [phone]

## 2021-06-16 NOTE — TELEPHONE ENCOUNTER
"4/6/2021    Referring Provider: Dr. Randall    Presenting Information:  I spoke to Xiao by phone today to discuss her genetic testing results. Her blood was drawn on 02/05/2021. BRCANext-Expanded testing was ordered  from [facility]. This testing was done because of Xiao's personal history of breast cancer.    Genetic Testing Result: NEGATIVE  Xiao is negative for mutations in the EZIO, BARD1, BRCA1, BRCA2, BRIP1, CDH1, CHEK2, DICER1, EPCAM, MLH1, MSH2, MSH6, NBN, NF1, PALB2, PMS2, PTEN, RAD51C, RAD51D, RECQL, SMARCA4, STK11, and TP53 genes. No mutations were found in any of the 23 genes analyzed. This test involved sequencing and deletion/duplication analysis of all genes with the exceptions of EPCAM (deletions/duplications only).    Testing did not detect an identifiable mutation associated with Hereditary Breast and Ovarian Cancer syndrome (BRCA1, BRCA2), Hardin syndrome (MLH1, MSH2, MSH6, PMS2, EPCAM), Hereditary Diffuse Gastric Cancer (CDH1), Cowden syndrome (PTEN), Li Fraumeni syndrome (TP53), Peutz-Jeghers syndrome (STK11), or Neurofibromatosis type 1 (NF1).    A copy of the test report can be found in the Media tab and named \"Genetics Scan-[employer]\". The report is scanned in as a linked document.    Interpretation:  We discussed several different interpretations of this negative test result.    1. One explanation may be that there is a different gene or combination of genes and environment that are associated with the cancers in Xiao and/or her relatives.    2. There is also a small possibility that there is a mutation in one of these genes, and we could not find it with our current testing methods.       Screening:  Based on this negative test result, it is important for Xiao and her relatives to refer back to the family history for appropriate cancer screening.      Xiao should continue to follow her oncology team's recommendations for the treatment and follow-up for her breast cancer history. "     Xiao s close female relatives remain at increased risk for breast cancer given their family history. Breast cancer screening is generally recommended to begin approximately 10 years younger than the earliest age of breast cancer diagnosis in the family, or at age 40, whichever comes first. Breast screening options should be discussed with an individual's primary care provider and a genetic counselor, to determine at what age to begin screening, what screening is appropriate, and if additional screening (such as breast MRI) is necessary based on personal/family history factors.     Other population cancer screening options, such as those recommended by the American Cancer Society and the National Comprehensive Cancer Network (NCCN), are also appropriate for Xiao and her family. These screening recommendations may change if there are changes to Xiao's personal and/or family history. Final screening recommendations should be made by each individual's managing physician.      Inheritance:  We reviewed the autosomal dominant inheritance of mutations in these 23 genes.  We discussed that Xiao cannot/did not pass on an identifiable mutation in these genes to her children based on this test result.  Mutations in these genes do not skip generations.      Additional Testing Considerations:  No further genetic is recommended for Xiao or her family at this time. Xiao was encouraged to contact me should her personal or family history change as this may change genetic testing recommendations.    Summary:  We do not have an explanation for Xiao's personal history of breast cancer.  Because of that, it is important that she continue with cancer screening based on her personal and family history as discussed above.    Genetic testing is rapidly advancing, and new cancer susceptibility genes will most likely be identified in the future.  Therefore, I encouraged Xiao to contact me annually or if there are changes in her  personal or family history.  This may change how we assess her cancer risk, screening, and the testing we would offer.    Plan:  1. A copy of the test results will be mailed to Xiao.  2. She plans to follow-up with her oncology team for treatment and follow-up for her breast cancer history and her primary care providers for routine care.  3. She should contact me annually, or sooner if her family history changes.    If Xiao has any further questions, I encouraged her to contact me at [phone].    Time spent on the phone: 10 minutes.    Fidelina Poe MS, [facility]  Licensed Genetic Counselor  [facility]  [phone]

## 2021-06-16 NOTE — PATIENT INSTRUCTIONS - HE
Flory,    It was a pleasure to see you today at [facility].    My nurse or I will call you with the monitor results.    Please call us if you have any questions or concerns about your heart.      Carlos Zhong M.D.  [facility]

## 2021-06-16 NOTE — TELEPHONE ENCOUNTER
Reason for Call:  Other immunization health document     Detailed comments: pt calling to see if we received the form faxed over to be filled out for her school- it is an immunization health document.  Please call pt if you do not have form, if you have questions or once completed      Phone Number Patient can be reached at:   Cell number on file:    Telephone Information:   Mobile [phone]       Best Time: na    Can we leave a detailed message on this number?: Yes    Call taken on 3/23/2021 at 3:56 PM by Frances Andujar

## 2021-06-17 ENCOUNTER — HOSPITAL ENCOUNTER (OUTPATIENT)
Dept: CT IMAGING | Facility: CLINIC | Age: 36
Discharge: HOME | End: 2021-06-17
Attending: INTERNAL MEDICINE
Payer: COMMERCIAL

## 2021-06-17 NOTE — PROGRESS NOTES
"Flory Beal is a 36 year old who is being evaluated via a billable video visit.       Video Start Time: 1:10pm    Video-Visit Details     Type of service:  Video Visit     Video End Time: 1:40pm    Originating Location (pt. Location): work     Distant Location (provider location):  Home    Platform used for Video Visit: [facility]    CLINICAL NUTRITION SERVICES - ASSESSMENT NOTE    Xiao Beal 36 y.o. referred for MNT related to breast cancer    Time Spent: 30 minutes  Visit Type: initial  Referring Physician: Dr. Espinoza  Pt accompanied by: self    NUTRITION HISTORY  Factors affecting nutrition intake include: none currently  Current diet: regular  Current appetite/intake: good  PEG Tube: N/A  Chemotherapy: pending   Radiation: ongoing         Diet Recall  Breakfast coffeee   Lunch Similar to dinner   Dinner Meat/ chicken with rice and vegetable or injera with spicy lentil/ bean sauce   Snacks rare   Beverages Water, fruit smoothie   Alcohol none   Dining out rare     ANTHROPOMETRICS  Height:   Ht Readings from Last 1 Encounters:   04/15/21 5' 3\" (1.6 m)     Weight:   Wt Readings from Last 1 Encounters:   04/26/21 146 lb 4.8 oz (66.4 kg)     BMI: 25.92 kg/m2  Weight Status:  Overweight  IBW: 115 lbs (52.3 kg)  % IBW: 127%  Weight History: Flory reports recent weight gain of 10-15 lbs.  Wt Readings from Last 10 Encounters:   04/26/21 146 lb 4.8 oz (66.4 kg)   04/19/21 146 lb 6.4 oz (66.4 kg)   04/15/21 147 lb (66.7 kg)   04/12/21 144 lb 12.8 oz (65.7 kg)   03/25/21 143 lb (64.9 kg)   02/19/21 138 lb 5 oz (62.7 kg)   01/28/21 144 lb (65.3 kg)   01/27/21 142 lb (64.4 kg)   01/11/21 142 lb 6.4 oz (64.6 kg)   01/11/21 142 lb 6.4 oz (64.6 kg)       Dosing Weight: 56 kg (adj for overweight)      Medications/vitamins/minerals/herbals:   Reviewed    Labs:   Labs reviewed    Physical Findings:  None    ASSESSED NUTRITION NEEDS:  Estimated Energy Needs: 1315-2693 kcals (25-30 kcal/ kg)  Justification: Maintenance  Estimated Protein " Needs: 56-67 grams protein (1-1.2 g/ kg)  Justification: Increased needs  Estimated Fluid Needs: 4226-6177 mL   Justification: Maintenance    MALNUTRITION:  % Weight Loss:  None noted  % Intake:  No decreased intake noted  Subcutaneous Fat Loss:  None noted  Muscle Loss:  None noted  Fluid Retention:  None noted    Malnutrition Diagnosis: Patient does not meet criteria for malnutrition    NUTRITION DIAGNOSIS:  Food and nutrition related knowledge defiict related to no previous diet education as evidenced by need for education regarding diet for cancer health.    INTERVENTIONS  Recommendations / Nutrition Prescription  1. Regular Diet with intake of regular, well-balanced meals to support adequate nutrition during cancer treatment.  Nutrition Education  Provided written & verbal education:   - Discussed recommend nutrition intake for cancer health. Encouraged regular meals and a plant based diet. Encouraged fruits, vegetables, whole grains, lean meats and low-fat dairy. Encouraged physical activity as able.    Provided pt with corresponding education materials/handouts on:  AICR Cancer Prevention Recommendations, Nutrition During and After Cancer Treatment, Build a Healthy Meal    Pt verbalize understanding of materials provided during consult.   Patient Understanding: Excellent  Expected Compliance: Excellent     Goals  1. Patient to demonstrate understanding of a healthy diet.  2. Weight maintenance versus gradual, intentional weight loss through cancer treatment.      Follow-Up Plans: Pt has RD contact information for questions.      MONITORING AND EVALUATION:  -Food intake  -Fluid/beverage intake  -Liquid meal replacement or supplement  -Weight trends      Hilda Cooper, MS, RD, LD

## 2021-06-17 NOTE — PROGRESS NOTES
RADIATION ONCOLOGY WEEKLY TREATMENT VISIT NOTE      Assessment / Impression       1. Invasive ductal carcinoma of breast, left (H)       Tolerating radiation therapy well.  All questions and concerns addressed.    Plan:     Continue radiation treatment as prescribed.    Discussed with patient about skin care.    Subjective:      HPI: Xiao Bael is a 36 y.o. female with    1. Invasive ductal carcinoma of breast, left (H)         The following portions of the patient's history were reviewed and updated as appropriate: allergies, current medications, past family history, past medical history, past social history, past surgical history and problem list.    Assessment                  Body Site: Breast                           Site: Left breast  Stereotactic Radiosurgery: No  Concurrent Therapy: No  Today's Dose: 5040  Total Dose for Breast: 6040  Today's Fraction/Total Fraction Breast:   Drainage: 0: Absent                                            Sexuality Alteration                 Emotional Alteration Copin: Ineffective(irritable, depressed)  Comfort Alteration KPS: 80% Can perform normal activity with effort, some signs of disease  Fatigue (ONS scale) : 8: Extreme Fatigue  Pain Location: chest wall, skin  Pain Intensity. Rate degree of pain ranging from 0 (no pain) to 10 (severe pain) : 5  Pain Description: Combination - A combination of pain descriptions (note)(burning, tight, achy)  Pain Intervention: 2: Nonsteroidal anti-inflammatory agents or non-opiods  Effectiveness of pain intervention: 2: Pain relieved 50%  Hot Flashes and/or Flushes: 2:Moderate and greater than 1 per day   Nutrition Alteration Anorexia: 0: None  Nausea: 0: None  Vomitin: None  Skin Alteration Skin Sensation: 3: Painful  Skin Reaction: 3: Dry desquamation with or without erythema(Radiaplex and aquafor given)  AUA Assessment                                  Accompanied by       Objective:     Exam: moderate,  severe Erythema.    Vitals:    05/24/21 0919   Weight: 146 lb 9.6 oz (66.5 kg)       Wt Readings from Last 8 Encounters:   05/24/21 146 lb 9.6 oz (66.5 kg)   05/19/21 147 lb (66.7 kg)   05/10/21 146 lb 8 oz (66.5 kg)   05/06/21 146 lb (66.2 kg)   04/26/21 146 lb 4.8 oz (66.4 kg)   04/19/21 146 lb 6.4 oz (66.4 kg)   04/15/21 147 lb (66.7 kg)   04/12/21 144 lb 12.8 oz (65.7 kg)       General: Alert and oriented, in no acute distress  Xiao has moderate, severe Erythema.  Aria chart and setup information reviewed    Iza Espinoza MD

## 2021-06-17 NOTE — PROGRESS NOTES
RADIATION ONCOLOGY WEEKLY TREATMENT VISIT NOTE      Assessment / Impression       1. Invasive ductal carcinoma of breast, left (H)       Tolerating radiation therapy well.  All questions and concerns addressed.    Plan:     Continue radiation treatment as prescribed.    The patient is concerned about taking longer time in some days for her radiation therapy.  I have assured her that sometimes it may take a longer time than usual in order to make sure everything set up correctly.    Discussed with patient about skin care.    Subjective:      HPI: Xiao Beal is a 36 y.o. female with    1. Invasive ductal carcinoma of breast, left (H)         The following portions of the patient's history were reviewed and updated as appropriate: allergies, current medications, past family history, past medical history, past social history, past surgical history and problem list.    Assessment                  Body Site: Breast                           Site: Left breast  Stereotactic Radiosurgery: No  Concurrent Therapy: No  Today's Dose: 4320  Total Dose for Breast: 6040  Today's Fraction/Total Fraction Breast:   Drainage: 0: Absent                                            Sexuality Alteration                 Emotional Alteration Copin: Ineffective(very anxious)  Comfort Alteration KPS: 70% Cannot do active work, but can care for self  Fatigue (ONS scale) : 9: Extreme Fatigue  Pain Location: chest wall, skin  Pain Intensity. Rate degree of pain ranging from 0 (no pain) to 10 (severe pain) : 5  Pain Description: Combination - A combination of pain descriptions (note)(burning, tightness)  Pain Intervention: 2: Nonsteroidal anti-inflammatory agents or non-opiods  Effectiveness of pain intervention: 2: Pain relieved 50%  Hot Flashes and/or Flushes: 2:Moderate and greater than 1 per day   Nutrition Alteration Anorexia: 0: None  Nausea: 0: None  Vomitin: None  Skin Alteration Skin Sensation: 3: Painful  Skin  Reaction: 2: Bright erythema(more Radiaplex and Mepilex given to pt today)  AUA Assessment                                  Accompanied by       Objective:     Exam: moderate Erythema.    There were no vitals filed for this visit.    Wt Readings from Last 8 Encounters:   05/10/21 146 lb 8 oz (66.5 kg)   05/06/21 146 lb (66.2 kg)   04/26/21 146 lb 4.8 oz (66.4 kg)   04/19/21 146 lb 6.4 oz (66.4 kg)   04/15/21 147 lb (66.7 kg)   04/12/21 144 lb 12.8 oz (65.7 kg)   03/25/21 143 lb (64.9 kg)   02/19/21 138 lb 5 oz (62.7 kg)       General: Alert and oriented, in no acute distress  Xiao has moderate Erythema.  Aria chart and setup information reviewed    Iza Espinoza MD

## 2021-06-17 NOTE — PATIENT INSTRUCTIONS - HE
"Patient Instructions by Emmanuel Peterson MD at 4/30/2019  8:10 AM     Author: Emmanuel Peterson MD Service: -- Author Type: Physician    Filed: 4/30/2019  8:50 AM Encounter Date: 4/30/2019 Status: Signed    : Emmanuel Peterson MD (Physician)       Patient Education     Understanding Body Mass Index (BMI)  Body mass index (BMI) is a method of screening for a weight category using the ratio of your height to your weight. The BMI is a measure of overweight that is corrected for height. Knowing your BMI is a way to tell if you are at a healthy weight. The higher your BMI, the greater your risk for weight-related health problems.  What BMI means    BMI below 18.5: Underweight    BMI 18.5 to 24.9: Healthy weight or \"ideal body weight\"     BMI 25 to 29.9: Overweight    BMI 30 and over: Obese    BMI 40 and over: Severe obesity   Online BMI Calculators  Find your BMI with an online BMI calculator tool, such as these from the CDC:    BMI calculator for adults    BMI calculator for children and teens   Using the BMI chart  To figure out your BMI, find your height and weight (or the numbers closest to them) on the table below. Follow each column of numbers to where your height and weight meet on the table. That is your BMI.    Date Last Reviewed: 7/1/2016 2000-2017 [employer]. [address] [place] [address]. All rights reserved. This information is not intended as a substitute for professional medical care. Always follow your healthcare professional's instructions.                "

## 2021-06-17 NOTE — PROGRESS NOTES
Pt requesting to be off work next Monday 5/17 and then for six weeks. Will discuss with Dr. Espinoza on Monday. Paperwork at RN desk.

## 2021-06-17 NOTE — PATIENT INSTRUCTIONS - HE
Xiao Beal,    It was a pleasure to see you today at the [facility].     My recommendations after this visit include:    CT heart  Use apple watch for heart rhythm monitor    SINA Celestin MD, FACC, IRINEO

## 2021-06-17 NOTE — PATIENT INSTRUCTIONS - HE
Patient Instructions by Rebekah Lau PT at 9/16/2019 11:30 AM     Author: Rebekah Lau PT Service: -- Author Type: Physical Therapist    Filed: 9/16/2019 12:18 PM Encounter Date: 9/16/2019 Status: Signed    : Rebekah Lau PT (Physical Therapist)       Continue doing your exercises 2 times a day, 10 repetitions of each exercises.

## 2021-06-17 NOTE — PROGRESS NOTES
RADIATION ONCOLOGY WEEKLY TREATMENT VISIT NOTE      Assessment / Impression       1. Invasive ductal carcinoma of breast, left (H)       Tolerating radiation therapy well.  All questions and concerns addressed.    Plan:     Continue radiation treatment as prescribed.    Subjective:      HPI: Xiao Beal is a 36 y.o. female with    1. Invasive ductal carcinoma of breast, left (H)         The following portions of the patient's history were reviewed and updated as appropriate: allergies, current medications, past family history, past medical history, past social history, past surgical history and problem list.    Assessment                  Body Site: Breast                           Site: Left breast  Stereotactic Radiosurgery: No  Concurrent Therapy: No  Today's Dose:   Total Dose for Breast: 6040  Today's Fraction/Total Fraction Breast:   Drainage: 0: Absent                                            Sexuality Alteration                 Emotional Alteration Copin: Effective  Comfort Alteration KPS: 90% Can perform normal activity, minor signs of disease  Fatigue (ONS scale) : 2: Mild Fatigue  Pain Location: denies  Hot Flashes and/or Flushes: 2:Moderate and greater than 1 per day   Nutrition Alteration Anorexia: 0: None  Nausea: 0: None  Vomitin: None  Skin Alteration Skin Sensation: 0: No problem  Skin Reaction: 1: Faint erythema or dry desquamation  AUA Assessment                                  Accompanied by       Objective:     Exam: Examination reviewed no significant changes.    Vitals:    21 0926   BP: 115/59   Pulse: 84   SpO2: 99%   Weight: 146 lb 4.8 oz (66.4 kg)       Wt Readings from Last 8 Encounters:   21 146 lb 4.8 oz (66.4 kg)   21 146 lb 6.4 oz (66.4 kg)   04/15/21 147 lb (66.7 kg)   21 144 lb 12.8 oz (65.7 kg)   21 143 lb (64.9 kg)   21 138 lb 5 oz (62.7 kg)   21 144 lb (65.3 kg)   21 142 lb (64.4 kg)       General:  Alert and oriented, in no acute distress  Xiao has no Erythema.  Aria chart and setup information reviewed    Iza Espinoza MD

## 2021-06-17 NOTE — PATIENT INSTRUCTIONS - HE
Patient Instructions by Emmanuel Peterson MD at 8/29/2019  8:50 AM     Author: Emmanuel Peterson MD Service: -- Author Type: Physician    Filed: 8/29/2019  9:24 AM Encounter Date: 8/29/2019 Status: Signed    : Emmanuel Peterson MD (Physician)       Patient Education     Understanding Cervical Strain    There are 7 bones (vertebrae) in the neck that are part of the spine. These are called the cervical spine. Cervical strain is a medical term for neck pain. The neck has several layers of muscles. These are connected with tendons to the cervical spine and other bones. Neck pain is often the result of injury to these muscles and tendons.  Causes of cervical strain  Different types of stress on the neck can damage muscles and tendons (soft tissues) and cause cervical strain. Cervical tissues can be damaged by:    The neck being forced past its normal range of motion, such as in a car accident or sports injury    Constant, low-level stress, such as from poor posture or a poorly set-up workspace  Symptoms of cervical strain  These may include:    Neck pain or stiffness    Pain in the shoulders or upper back    Muscle spasms    Headache, often starting at the base of the neck    Irritability, difficulty concentrating, or sleeplessness  Treatment for cervical strain  This problem often gets better on its own. Treatments aim to reduce pain and inflammation and increase the range of motion of the neck. Possible treatments include:    Over-the-counter or prescription pain medicine. These help relieve pain and inflammation.    Stretching exercises to decrease neck stiffness.    Massage to decrease neck stiffness.    Cold or heat pack. These help reduce pain and swelling.  Call 911  Call 911 right away if you have any of these:    Face drooping or numbness    Numbness or weakness, especially in the arms or on one side    Slurred speech or difficulty speaking    Blurred vision   When to call your healthcare provider  Call  your healthcare provider right away if you have any of these:    Fever of 100.4 F (38 C) or higher, or as directed    Pain or stiffness that gets worse    Symptoms that dont get better, or get worse    Numbness, tingling, weakness or shooting pains into the arms or legs    New symptoms  Date Last Reviewed: 3/10/2016    0981-7082 [employer]. [address] [place] [address]. All rights reserved. This information is not intended as a substitute for professional medical care. Always follow your healthcare professional's instructions.

## 2021-06-17 NOTE — PROGRESS NOTES
RADIATION ONCOLOGY WEEKLY TREATMENT VISIT NOTE      Assessment / Impression       1. Invasive ductal carcinoma of breast, left (H)       Tolerating radiation therapy well.  All questions and concerns addressed.    Plan:     Continue radiation treatment as prescribed.    The patient experienced some moderate fatigue since starting radiation therapy.  Discussed with the patient about appropriate nutritional support.  Patient is also advised to take over-the-counter multiple vitamin.    Discussed with the patient about appropriate skin care.    Subjective:      HPI: Xiao Beal is a 36 y.o. female with    1. Invasive ductal carcinoma of breast, left (H)         The following portions of the patient's history were reviewed and updated as appropriate: allergies, current medications, past family history, past medical history, past social history, past surgical history and problem list.    Assessment                  Body Site: Breast                           Site: Left breast  Stereotactic Radiosurgery: No  Concurrent Therapy: No  Today's Dose: 3060  Total Dose for Breast: 6040  Today's Fraction/Total Fraction Breast:   Drainage: 0: Absent                                            Sexuality Alteration                 Emotional Alteration Copin: Effective  Comfort Alteration KPS: 80% Can perform normal activity with effort, some signs of disease  Fatigue (ONS scale) : 8: Extreme Fatigue  Pain Location: denies  Hot Flashes and/or Flushes: 2:Moderate and greater than 1 per day   Nutrition Alteration Anorexia: 0: None  Nausea: 0: None  Vomitin: None  Skin Alteration Skin Sensation: 3: Painful  Skin Reaction: 2: Bright erythema(small dry desquamation area near collar bone, Aquaphor given)  AUA Assessment                                  Accompanied by       Objective:     Exam: mild Erythema.    Vitals:    21 0929   Weight: 146 lb (66.2 kg)       Wt Readings from Last 8 Encounters:    05/06/21 146 lb (66.2 kg)   04/26/21 146 lb 4.8 oz (66.4 kg)   04/19/21 146 lb 6.4 oz (66.4 kg)   04/15/21 147 lb (66.7 kg)   04/12/21 144 lb 12.8 oz (65.7 kg)   03/25/21 143 lb (64.9 kg)   02/19/21 138 lb 5 oz (62.7 kg)   01/28/21 144 lb (65.3 kg)       General: Alert and oriented, in no acute distress  Xiao has mild Erythema.  Aria chart and setup information reviewed    Iza Espinoza MD

## 2021-06-17 NOTE — PROGRESS NOTES
I left a voicemail on Flory's home/cell number letting her know our treatment machine is down today and will be down tomorrow until at least 10am.  I asked that she calls us back to reschedule tomorrow's treatment time to a later time and to confirm that she got the message about today's cancelled treatment as well.  I left the Rad Onc scheduling number to call us back. [phone].
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
## 2021-06-17 NOTE — PROGRESS NOTES
RADIATION ONCOLOGY WEEKLY TREATMENT VISIT NOTE      Assessment / Impression       1. Invasive ductal carcinoma of breast, left (H)       Tolerating radiation therapy well.  All questions and concerns addressed.    Plan:     Continue radiation treatment as prescribed.    Discussed with the patient about skin care.  Recommend patient to take over-the-counter pain medication for symptom control.    Subjective:      HPI: Xiao Beal is a 36 y.o. female with    1. Invasive ductal carcinoma of breast, left (H)         The following portions of the patient's history were reviewed and updated as appropriate: allergies, current medications, past family history, past medical history, past social history, past surgical history and problem list.    Assessment                  Body Site: Breast                           Site: Left breast  Stereotactic Radiosurgery: No  Concurrent Therapy: No  Today's Dose: 3420  Total Dose for Breast: 6040  Today's Fraction/Total Fraction Breast:   Drainage: 0: Absent                                            Sexuality Alteration                 Emotional Alteration Copin: Effective  Comfort Alteration KPS: 80% Can perform normal activity with effort, some signs of disease  Fatigue (ONS scale) : 8: Extreme Fatigue  Pain Location: denies  Hot Flashes and/or Flushes: 2:Moderate and greater than 1 per day   Nutrition Alteration Anorexia: 0: None  Nausea: 0: None  Vomitin: None  Skin Alteration Skin Sensation: 3: Painful  Skin Reaction: 2: Bright erythema(Mepilex given with instruction)  AUA Assessment                                  Accompanied by       Objective:     Exam: mild, moderate Erythema.    Vitals:    05/10/21 0929   Weight: 146 lb 8 oz (66.5 kg)       Wt Readings from Last 8 Encounters:   05/10/21 146 lb 8 oz (66.5 kg)   21 146 lb (66.2 kg)   21 146 lb 4.8 oz (66.4 kg)   21 146 lb 6.4 oz (66.4 kg)   04/15/21 147 lb (66.7 kg)   21 144  lb 12.8 oz (65.7 kg)   03/25/21 143 lb (64.9 kg)   02/19/21 138 lb 5 oz (62.7 kg)       General: Alert and oriented, in no acute distress  Xiao has mild, moderate Erythema.  Aria chart and setup information reviewed    Iza Espinoza MD

## 2021-06-18 NOTE — PATIENT INSTRUCTIONS - HE
Patient Instructions by Lamar Penaloza RN at 3/25/2021 10:30 AM     Author: Lamar Penaloza RN Service: -- Author Type: Registered Nurse    Filed: 3/25/2021 11:26 AM Encounter Date: 3/25/2021 Status: Signed    : Lamar Penaloza RN (Registered Nurse)       Patient Education     Radiation Therapy Treatment  Radiation therapy uses high-energy X-rays to kill cancer cells. Radiation therapy can help you in your fight against cancer. It begins with a session to discuss treatment with your healthcare provider. If you and your provider decide on radiation, you will return for a treatment planning visit called a simulation. Then you will start treatment.  The simulation is a planning session that helps your healthcare provider target your cancer. He or she will design a radiation plan to protect your healthy tissues from radiation. Your radiation therapy team uses a special machine called a simulator to map out your treatment. The simulator is usually an X-ray machine (fluoroscopy), CT scanner, MRI scanner, or PET-CT scanner machine. Laser lights act as guides to help position your body accurately. During this visit:    The team figures out the best position for your body. They make notes in your chart so youll be placed the same way each time.    They may use special devices to keep your body correctly positioned and still during treatment. These may include molds, masks, rests, and blocks.    The team makes ink marks on your skin. These will help you get in the same position for each treatment. Tiny permanent tattoos may also be used. These tattoos may be removed later with laser treatments.    Markers such as metal balls or wires may be put on or in your body. Sometime these are taped to the skin to help with the imaging process. These work with the X-rays to position your body. The markers are removed when the visit is over.  After the team has the imaging and data, the information is sent into the computer  planning system. Your doctor and the team of physicists and dosimetrists design a treatment field. The field will best target your cancer and how it might spread. It will also help limit radiation to nearby normal tissues.  Your treatments  When the simulation and plan are completed, you will begin your daily treatments. Treatment is usually once daily, Monday through Friday, for 5 to 7 weeks. It takes less than 30 minutes. Sometimes you may need radiation twice a day, with about 6 hours between treatments.  You may need to change into a hospital gown. The radiation therapist puts you in the correct position on the treatment table, then leaves the room. Sometimes you may need more imaging before each treatment. The machine may take digital X-rays or a CT scan to help make sure you are lined up correctly. During treatment, lie as still as you can and breathe normally. You will hear noises coming from the machine. You can talk to the radiation therapist, who watches you from the control room on a TV monitor. After treatment, the therapist will help you off the table. You can then get dressed and go back to your normal activities.  After treatment  After your radiation treatments are done, you will have follow-up appointments. These are to make sure the cancer is under control. Tell your healthcare team about any side effects from the treatment. The team will help you manage them.  Date Last Reviewed: 6/1/2018 2000-2019 [employer]. [address] [place] [address], [place] [address]. All rights reserved. This information is not intended as a substitute for professional medical care. Always follow your healthcare professional's instructions.

## 2021-06-19 NOTE — LETTER
Letter by Emmanuel Peterson MD at      Author: Emmanuel Peterson MD Service: -- Author Type: --    Filed:  Encounter Date: 8/29/2019 Status: (Other)         August 29, 2019     Patient: Xiao Beal   YOB: 1985   Date of Visit: 8/29/2019       To Whom It May Concern:    It is my medical opinion that Xiao Beal should remain out of work until 08/29/2019- 08/30/2019.    If you have any questions or concerns, please don't hesitate to call.    Sincerely,        Electronically signed by Emmanuel Peterson MD

## 2021-06-21 NOTE — LETTER
Letter by Sharita Lieberman RN at      Author: Sharita Lieberman RN Service: -- Author Type: --    Filed:  Encounter Date: 3/24/2021 Status: (Other)       Dear Xiao:    Thank you for choosing [facility] for your care.  We are committed to providing you with the highest quality and compassionate healthcare services.  The following information pertains to your first appointment with our clinic.    Date/Time of appointment: Thursday, March 25, 2021--please arrive no later than 10:30am for your 11:00am consult with Dr. Espinoza     Name of your Physician: Iza Espinoza MD (Radiation Oncology, 1st floor)    What to bring to your appointment:    Completed Patient History/Initial Nursing Assessment     Your current insurance card(s).    Parking:    Please refer to the map included to direct you.  The [facility] is located at the [place] end of [facility] in [place], [place].      After turning onto [address] from [facility], take a right turn at the first stop sign.  We have designated parking on the left, identified as parking for Cancer Care patients (Lot D).     The Code to Enter Lot D is: 0301. This code changes monthly and will always coincide with the current month followed by 01. For example August will be 0801.  The month will continue to change but the 01 will remain constant.  If lot D is full please use Parking Lot A, directly across the street.    Please enter the Cancer Center on the north end of the [facility].  You will see a sign on the building.        For Radiation Oncology appointments, please go straight through the double doors and check in.     We hope these instructions are helpful to you.  If you have any questions or concerns, please call us at [phone].  It is our pleasure to assist you.    Warm Regards,  Sharita Lieberman RN, BSN, OCN, CBCN  Cancer Care Navigator  [phone]

## 2021-06-21 NOTE — LETTER
"Letter by Fidelina Poe, Genetic Counselor at      Author: Fidelina Poe, Genetic Counselor Service: -- Author Type: --    Filed:  Encounter Date: 2/4/2021 Status: (Other)         Theresa Randall DO  [address] [address]                                  February 5, 2021    Patient: Xiao Beal   MR Number: [id number]   YOB: 1985   Date of Visit: 2/4/2021     Dear Dr. Randall, DO:    Thank you for referring Xiao Beal to me for evaluation. Below are the relevant portions of my assessment and plan of care.    If you have questions, please do not hesitate to call me. I look forward to following Xiao along with you.    Sincerely,        Fidelina Poe, Genetic Counselor            MD Deepika Amin Anna R, Genetic Counselor  2/5/2021  2:07 PM  Sign when Signing Visit  2/5/2021     Xiao Beal is a 35 y.o. female who is being evaluated via a billable telephone visit.      The patient has been notified of following:     \"This telephone visit will be conducted via a call between you and your physician/provider. We have found that certain health care needs can be provided without the need for a physical exam.  This service lets us provide the care you need with a short phone conversation.  If a prescription is necessary we can send it directly to your pharmacy.  If lab work is needed we can place an order for that and you can then stop by our lab to have the test done at a later time.    Telephone visits are billed at different rates depending on your insurance coverage. During this emergency period, for some insurers they may be billed the same as an in-person visit.  Please reach out to your insurance provider with any questions.    If during the course of the call the physician/provider feels a telephone visit is not appropriate, you will not be charged for this service.\"    Patient has given verbal consent to a Telephone visit? Yes    What phone number " would you like to be contacted at? [phone]    Patient would like to receive their AVS by AVS Preference: Juan.    Referring Provider: Theresa Randall DO    Present for Today's Visit: Xiao    Presenting Information:   I spoke with Xiao Beal over the phone today for genetic counseling to discuss her recent diagnosis of breast cancer.  She is here today to review this history, cancer screening recommendations, and available genetic testing options.    Personal History:  Xiao is a 35 y.o. female. (Patient state that her birth date is entered incorrectly in our system; she is actually 46-50 years old; reports that she is 49, but based on her reported age at first birth and her first child's reported age, she would he 45). She was recently diagnosed with a left breast cancer picked up by the patient. Biopsy completed on 1/21/2021 revealed invasive ductal carcinoma; ER positive, AR weakly positive (1-5%), and HER2 negative. She also had two areas in her right breast biopsied that both revealed fibroadenomas. She has met with Dr. Randall to discuss surgical options. Xiao stated that the results from genetic testing will help determine whether bilateral mastectomy is the right surgical option for her.      She had her first menstrual period at age 14, her first child at age 29, and is postmenopausal.  Xiao has her ovaries, fallopian tubes and uterus in place, and she has had no ovarian cancer screening to date.  She reports no history of oral contraceptive use and that she has never been on hormone replacement therapy.      Her most recent OB-GYN exam and Pap smear in 2018 were normal. She reports that she had her first colonoscopy given her age just earlier this week that resulted in the removal of some polyps. These records are not yet available as it sounds like pathology still may be pending. She does not regularly do any other cancer screening at this time.  Xiao reported no tobacco use, and no alcohol  use.    Family History: Xiao's family history is significant for the following (Please see scanned pedigree for detailed family history information)  Children/Siblings    Xiao has two daughters, ages 15 and 16, both reportedly healthy.     Xiao has two sisters, ages 48 and 53, and two brothers, ages 42 and 45; all healthy with no reported cancer history.   Maternal    Xiao's mother, age 71, is reportedly healthy with no cancer history.     No known cancer history reported in her five maternal aunts, two maternal uncles, any of her maternal first-cousins, or either of her maternal grandparents. She dies reports that two of her maternal aunts and one maternal uncle passed from stomach issues that may have been cancers, but she is unsure.   Paternal    Xiao's father passed away at age 65 from lung cancer. He was a smoker.     No cancer history reported in two paternal aunts or any of her paternal first-cousins. She reports that she has no information regarding her paternal grandparents' health histories.       Her maternal ethnicity is [place]. Her paternal ethnicity is [place].  There is no known Ashkenazi Confucianism ancestry on either side of her family. There is no reported consanguinity.    Discussion:    Xiao's family history of breast cancer is may be suggestive of a hereditary cancer syndrome.    We reviewed the features of sporadic, familial, and hereditary cancers. In looking at Xiao's family history, it is possible that a cancer susceptibility gene is present due to her age at diagnosis (46-50 years).      We discussed the natural history and genetics of hereditary breast cancers. A detailed handout regarding the information we discussed will be sent to Xiao via [email or web address]. Topics included: inheritance pattern, cancer risks, cancer screening recommendations, and also risks, benefits and limitations of testing.    We reviewed that the most common cause of hereditary breast cancer is Hereditary Breast and  Ovarian Cancer (HBOC) syndrome, which is caused by mutations in the genes BRCA1 and BRCA2. BRCA1 and BRCA2 are two genes that increase the risk for breast and ovarian cancers, among others. Women who inherit a BRCA mutation have a 50 to 85% lifetime risk of breast cancer and up to a 58% lifetime risk of ovarian cancer. This is higher than the general population lifetime risks of 12% for breast cancer and less than 2% for ovarian cancer. Men with BRCA gene mutations have up to a 7% risk of breast cancer and 20% risk of prostate cancer. Other cancers, such as pancreatic cancer and melanoma, have also been associated with BRCA mutations.    We discussed that insurance coverage for genetic testing is typically dependent on a personal or family history meeting criteria for genetic testing for a hereditary cancer syndrome such as BRCA1/2. Currently, based on Xiao's stated age (46-50y), [place] personal and family history does not clearly meet criteria for genetic testing for a specific hereditary cancer syndrome. We discussed options for proceeding with genetic testing including the insurance process. We also discussed patient pay options through the laboratories. An argument could be made that she has very limited information regarding her paternal family history which is considered in the National [facility] guidelines for genetic testing for high-penetrance breast and/or ovarian cancer susceptibility genes for patient diagnosed with breast cancer between ages 46-50y. Xiao expressed understanding, and after our discussion, Xiao was comfortable proceeding with genetic testing submitting to insurance.     Additionally, according to the American Society of Breast Surgeons Consensus Guideline on Genetic Testing for Hereditary Breast Cancer, genetic testing should be available to all patients who have been diagnosed with breast cancer.    We discussed that there are additional genes that could cause  increased risk for breast cancer. As many of these genes present with overlapping features in a family and accurate cancer risk cannot always be established based upon the pedigree analysis alone, it would be reasonable for Xiao to consider panel genetic testing to analyze multiple genes at once.    We reviewed genetic testing options for hereditary breast and gynecologic cancers: actionable high/moderate breast and gynecologic cancer risk custom panel (CustomNext-Cancer, 19 genes, a combination of BRCANext +  STK11) and expanded high and moderate risk panel (BRCANext-Expanded, 23 genes). Xiao expressed an interest in learning as much information as possible from the testing. She opted for the BRCANext-Expanded panel.  Genetic testing is available for 23 genes associated with hereditary gynecologic, breast, and related cancers: BRCANext-Expanded (EZIO, BARD1, BRCA1, BRCA2, BRIP1, CDH1, CHEK2, DICER1, EPCAM, MLH1, MSH2, MSH6, NBN, NF1, PALB2, PMS2, PTEN, RAD51C, RAD51D, RECQL, SMARCA4, STK11, TP53).  We discussed that some of the genes in the BRCANext-Expanded panel are associated with specific hereditary cancer syndromes and published management guidelines: Hereditary Breast and Ovarian Cancer syndrome (BRCA1, BRCA2), Hardin syndrome (MLH1, MSH2, MSH6, PMS2, EPCAM), Hereditary Diffuse Gastric Cancer (CDH1), Cowden syndrome (PTEN), Li Fraumeni syndrome (TP53), Peutz-Jeghers syndrome (STK11), and Neurofibromatosis type 1 (NF1).    Risk-reducing salpingo-oophorectomy can be considered in women with mutations in BRIP1, RAD51C, or RAD51D. Breast and/or other cancer risk management guidelines are available for EZIO, CHEK2, PALB2, NF1, and NBN.  The remaining genes (BARD1, DICER1, RECQL, and SMARCA4) are associated with increased cancer risk and may allow us to make medical recommendations when mutations are identified.      Consent was obtained over the phone with no witness required due to the current covid19 global  pandemic.    Medical Management: For Xiao, we reviewed that the information from genetic testing may determine:    surgery to treat Xiao's active cancer diagnosis (i.e. lumpectomy versus bilateral mastectomy, partial versus total colectomy, etc.),    additional cancer screening for which Xiao may qualify (i.e. mammogram and breast MRI, more frequent colonoscopies, more frequent dermatologic exams, etc.),    options for risk reducing surgeries Xiao could consider (i.e. bilateral mastectomy, surgery to remove her ovaries and/or uterus, etc.),      and targeted chemotherapies for Xiao's active cancer, or if she were to develop certain cancers in the future (i.e. immunotherapy for individuals with Hardin syndrome, PARP inhibitors, etc.).     These recommendations and possible targeted chemotherapies will be discussed in detail once genetic testing is completed.     We discussed that Xiao's surgical decisions are pending genetic testing results. For that reason, I will place a STAT request on the BRCAPlus genes (EZIO, BRCA1, BRCA2, CDH1, CHEK2, PALB2, PTEN, and TP53) in order to get these results back as soon as possible.       Plan:  1) Today Xiao elected to proceed with BRCANext-Expanded genetic testing (23 genes) through [facility].  2) A STAT request was placed on the BRCAPlus genes (including BRCA1 and BRCA2) and we will get these results back in about 2 weeks. I will call Xiao with these results as soon as they are available.     Time spent over the phone: 54 minutes     Fidelina Poe MS, [facility]  Licensed Genetic Counselor  [facility]  [phone]

## 2021-06-21 NOTE — LETTER
Letter by Fidelina Poe, Genetic Counselor at      Author: Fidelina Poe, Genetic Counselor Service: -- Author Type: --    Filed:  Encounter Date: 2/4/2021 Status: (Other)       [facility]  Hereditary Breast and Gynecologic Cancers  Assessing Cancer Risk  Only about 5-10% of cancers are thought to be due to an inherited cancer susceptibility gene.    These families often have:    Several people with the same or related types of cancer    Cancers diagnosed at a young age (before age 50)    Individuals with more than one primary cancer    Multiple generations of the family affected with cancer    Some people may be candidates for genetic testing of more than one gene.  For these families, genetic testing using a cancer panel may be offered.  These panels will test different genes known to increase the risk for breast, ovarian, uterine, and/or other cancers. All of the genes discussed below have published clinical management guidelines for individuals who are found to carry a mutation. The purpose of this handout is to serve as a brief summary of the genes analyzed by the panels used to inquire about hereditary breast and gynecologic cancer:  EIZO, BRCA1, BRCA2, BRIP1, CDH1, CHEK2, MLH1, MSH2, MSH6, PMS2, EPCAM, PTEN, PALB2, RAD51C, RAD51D, and TP53.  ______________________________________________________________________________  Hereditary Breast and Ovarian Cancer Syndrome   (BRCA1 and BRCA2)  A single mutation in one of the copies of BRCA1 or BRCA2 increases the risk for breast and ovarian cancer, among others.  The risk for pancreatic cancer and melanoma may also be slightly increased in some families.  The chart below shows the chance that someone with a BRCA mutation would develop cancer in his or her lifetime1,2,3,4.      Lifetime Cancer Risks    General Population BRCA   Breast 12% ~80%   Ovarian 1-2% 11-40%   Male Breast <1% 7-8%   Prostate 16% 20%       A persons ethnic background is  also important to consider, as individuals of Ashkenazi Congregation ancestry have a higher chance of having a BRCA gene mutation.  There are three BRCA mutations that occur more frequently in this population.    Hardin Syndrome   (MLH1, MSH2, MSH6, PMS2, and EPCAM)  Currently five genes are known to cause Hardin Syndrome: MLH1, MSH2, MSH6, PMS2, and EPCAM.  A single mutation in one of the Hardin Syndrome genes increases the risk for colon, endometrial, ovarian, and stomach cancers.  Other cancers that occur less commonly in Hardin Syndrome include urinary tract, skin, and brain cancers.  The chart below shows the chance that a person with Hardin syndrome would develop cancer in his or her lifetime5.      Lifetime Cancer Risks    General Population Hardin Syndrome   Colon 5.5% ~80%   Endometrial 2.7% 15-60%   Stomach <1% 1-13%   Ovarian 1-2% 4-24%       Cowden Syndrome   (PTEN)  Cowden syndrome is a hereditary condition that increases the risk for breast, thyroid, endometrial, colon, and kidney cancer.  Cowden syndrome is caused by a mutation in the PTEN gene.  A single mutation in one of the copies of PTEN causes Cowden syndrome and increases cancer risk.  The chart below shows the chance that someone with a PTEN mutation would develop cancer in their lifetime6,7.  Other benign features seen in some individuals with Cowden syndrome include benign skin lesions (facial papules, keratoses, lipomas), learning disability, autism, thyroid nodules, colon polyps, and larger head size.      Lifetime Cancer Risks    General Population Cowden Synrome   Breast 12% 25-50%   Thyroid 1% Up to 35%   Renal 1-2% Up to 35%   Endometrial 2.7% Up to 28%   Colon  5.5% 9%   Melanoma 2-3% 6%   ** One recent study found breast cancer risk to be increased to 85%     Li-Fraumeni Syndrome   (TP53)  Li-Fraumeni Syndrome (LFS) is a cancer predisposition syndrome caused by a mutation in the TP53 gene. A single mutation in one of the copies of TP53 increases  the risk for multiple cancers. Individuals with LFS are at an increased risk for developing cancer at a young age. The lifetime risk for development of a LFS-associated cancer is 50% by age 30 and 90% by age 60.   Core Cancers: Sarcomas, Breast, Brain, Lung, Leukemias/Lymphomas, Adrenocortical carcinomas  Other Cancers: Gastrointestinal, Thyroid, Skin, Genitourinary    Hereditary Diffuse Gastric Cancer   (CDH1)  Currently, one gene is known to cause hereditary diffuse gastric cancer (HDGC): CDH1.  Individuals with HDGC are at increased risk for diffuse gastric cancer and lobular breast cancer. Of people diagnosed with HDGC, 30-50% have a mutation in the CDH1 gene.  This suggests there are likely other genes that may cause HDGC that have not been identified yet.      Lifetime Cancer Risks    General Population HDGC    Diffuse Gastric  <1% ~80%   Breast 12% 39-52%         Additional Genes  EZIO  EZIO is a moderate-risk breast cancer gene. Women who have a mutation in EZIO can have between a 2-4 fold increased risk for breast cancer compared to the general population8. EZIO mutations have also been associated with increased risk for pancreatic cancer, however an estimate of this cancer risk is not well understood9. Individuals who inherit two EZIO mutations have a condition called ataxia-telangiectasia (AT).  This rare autosomal recessive condition affects the nervous system and immune system, and is associated with progressive cerebellar ataxia beginning in childhood.  Individuals with ataxia-telangiectasia often have a weakened immune system and have an increased risk for childhood cancers.    PALB2  Mutations in PALB2 have been shown to increase the risk of breast cancer up to 33-58% in some families; where individuals fall within this risk range is dependent upon family [id number]. PALB2 mutations have also been associated with increased risk for pancreatic cancer, although this risk has not been quantified yet.   Individuals who inherit two PALB2 mutations--one from their mother and one from their father--have a condition called Fanconi Anemia.  This rare autosomal recessive condition is associated with short stature, developmental delay, bone marrow failure, and increased risk for childhood cancers.    CHEK2   CHEK2 is a moderate-risk breast cancer gene.  Women who have a mutation in CHEK2 have around a 2-fold increased risk for breast cancer compared to the general population, and this risk may be higher depending upon family history.11,12,13 Mutations in CHEK2 have also been shown to increase the risk of a number of other cancers, including colon and prostate, however these cancer risks are currently not well understood.    BRIP1, RAD51C and RAD51D  Mutations in BRIP1, RAD51C, and RAD51D have been shown to increase the risk of ovarian cancer and possibly female breast cancer as well14,15 .       Lifetime Cancer Risk    General Population BRIP1 RAD51C RAD51D   Ovarian 1-2% ~5-8% ~5-9% ~7-15%         Inheritance  All of the cancer syndromes reviewed above are inherited in an autosomal dominant pattern.  This means that if a parent has a mutation, each of his or her children will have a 50% chance of inheriting that same mutation.  Therefore, each child--male or female--would have a 50% chance of being at increased risk for developing cancer.    Mutations in some genes can occur de walter, which means that a persons mutation occurred for the first time in them and was not inherited from a parent.  Now that they have the mutation, however, it can be passed on to future generations.    Genetic Testing  Genetic testing involves a blood test and will look at the genetic information in the EZIO, BRCA1, BRCA2, BRIP1, CDH1, CHEK2, MLH1, MSH2, MSH6, PMS2, EPCAM, PTEN, PALB2, RAD51C, RAD51D, and TP53 genes for any harmful mutations that are associated with increased cancer risk.  If possible, it is recommended that the person(s) who has  had cancer be tested before other family members.  That person will give us the most useful information about whether or not a specific gene is associated with the cancer in the family.    Results  There are three possible results of genetic testing:    Positive--a harmful mutation was identified in one or more of the genes    Negative--no mutation was identified in any of the genes on this panel    Variant of unknown significance--a variation in one of the genes was identified, but it is unclear how this impacts cancer risk in the family    Advantages and Disadvantages   There are advantages and disadvantages to genetic testing.    Advantages    May clarify your cancer risk    Can help you make medical decisions    May explain the cancers in your family    May give useful information to your family members (if you share your results)    Disadvantages    Possible negative emotional impact of learning about inherited cancer risk    Uncertainty in interpreting a negative test result in some situations    Possible genetic discrimination concerns (see below)    Genetic Information Nondiscrimination Act (SILVINO)  SILVINO is a federal law that protects individuals from health insurance or employment discrimination based on a genetic test result alone.  Although rare, there are currently no legal discrimination protections in terms of life insurance, long term care, or disability insurances.  Visit the National Human Genome Research [place] website to learn more.    Reducing Cancer Risk  All of the genes described above have nationally recognized cancer screening guidelines that would be recommended for individuals who test positive.  In addition to increased cancer screening, surgeries may be offered or recommended to reduce cancer risk.  Recommendations are based upon an individuals genetic test result as well as their personal and family history of cancer.    Questions to Think About Regarding Genetic Testing:    What  effect will the test result have on me and my relationship with my family members if I have an inherited gene mutation?  If I dont have a gene mutation?    Should I share my test results, and how will my family react to this news, which may also affect them?    Are my children ready to learn new information that may one day affect their own health?    Hereditary Cancer Resources    FORCE: Facing Our Risk of Cancer Empowered facingourrisk.org   Bright Pink bebrightpink.org   Li-Fraumeni Syndrome Association lfsassociation.org   PTEN World PTENworld.com   No stomach for cancer, Inc. nostomachforcancer.org   Stomach cancer relief network Scrnet.org   Collaborative Group of the Americas on Inherited Colorectal Cancer (CGA) cgaicc.com    Cancer Care cancercare.org   American Cancer Society (ACS) cancer.org   National Cancer [place] (NCI) cancer.gov     Please call us if you have any questions or concerns.   Cancer Risk Management Program  q Ramone Ballard, MS, [facility] [phone]  q Wendy Zapata, MS, [facility] [phone]  q Margie Barney, MS, [facility] [phone]  q Karolina Coffey, MS, [facility] [phone]  q Fidelina Poe, MS, [facility] [phone]  q Ifrah Toro, MS, [facility] [phone]  q Sandra Aguirre, MS, [facility] [phone]      References  1. Arnold Jarvis PDP, Mahsa S, Karyn MCKEON, Sylvester JE, Nani JL, Zaydaman N, Suellen H, Michael O, Manny A, Elke B, Olu P, Yesenia S, Boby DM, Lubin N, Malka E, Edgard H, Durant E, Moyinski J, Gronasher J, Annamarai B, Jessica H, Jeremielamelody S, Parveen H, Salo H, Brian K, Jenniefr OP. Average risks of breast and ovarian cancer associated with BRCA1 or BRCA2 mutations detected in case series unselected for family history: a combined analysis of 222 studies. Am J Hum Majo. 2003;72:1117-30.  2. Bandar Finleyy M, Kem DODGE.  BRCA1 and BRCA2 Hereditary Breast and Ovarian Cancer. Gene Reviews online. 2013.  3. Odell YC, Mera S, Kirk G, Louise S. Breast cancer risk among male BRCA1 and  BRCA2 mutation carriers. J Natl Cancer Inst. 2007;99:1811-4.  4. Dalton GALLO, Osmel I, Noah J, Leeanna E, Bhavik ER, Hai F. Risk of breast cancer in male BRCA2 carriers. J Med Majo. 2010;47:710-1.  5. National Comprehensive Cancer Network. Clinical practice guidelines in oncology, colorectal cancer screening. Available online (registration required). 2015.  6. Waddell MH, Georgiana J, Reid J, Priscila ORELLANA, Domenico MS, Lisa C. Lifetime cancer risks in individuals with germline PTEN mutations. Clin Cancer Res. 2012;18:400-7.  7. Pilarski R. Cowden Syndrome: A Critical Review of the Clinical Literature. J Majo . 2009:18:13-27.  8. Jasbir SHAFFER, Vivek D, Mandi S, Kailey P, Mynor T, Rafael M, Hema B, Clarissa H, Jermain R, Fabio K, Jeanna L, Dalton GALLO, Boby D, Beto DF, Chacho MR, The Breast Cancer Susceptibility Collaboration (UK) & Milena THORNTON. EZIO mutations that cause ataxia-telangiectasia are breast cancer susceptibility alleles. Nature Genetics. 2006;38:873-875  9. David N , Mar Y, Milagro J, Tim L, Sanchez GM , Armida ML, Gallinger S, Montoya AG, Syngal S, Lizabeth ML, Shikha J , Alejandro R, Wayne SZ, Esjonnie JR, Kip VE, Dany M, Vogelstein B, Zackary N, Erican RH, Salvador KW, and Todd AP. EZIO mutations in patients with hereditary pancreatic cancer. Cancer Discover. 2012;2:41-46  10. Desi RAJPUT, et al. Breast-Cancer Risk in Families with Mutations in PALB2. NEJM. 2014; 371(6):497-506.  11. CHEK2 Breast Cancer Case-Control Consortium. CHEK2*1100delC and susceptibility to breast cancer: A collaborative analysis involving 10,860 breast cancer cases and 9,065 controls from 10 studies. Am J Hum Majo, 74 (2004), pp. 8749-7542  12. Pili T, Dennis S, Carlee K, et al. Spectrum of Mutations in BRCA1, BRCA2, CHEK2, and TP53 in Families at High Risk of Breast Cancer. JOSE ANTONIO. 2006;295(12):6744-3421.   13. Jalen BAILEY, Dante ZAPATA, Otf SHAFFER, et al. Risk of breast cancer in women with a  CHEK2 mutation with and without a family history of breast cancer. J Clin Oncol. 2011;29:7581-3496.  14. Jackson H, Jenna E, Latesha SJ, et al. Contribution of germline mutations in the RAD51B, RAD51C, and RAD51D genes to ovarian cancer in the population. J Clin Oncol. 2015;33(26):6535-3460. Doi:10.1200/JCO.2015.61.2408.  15. Deborah T, Brenna HUNTER, Betty P, et al. Mutations in BRIP1 confer high risk of ovarian cancer. Irma Majo. 2011;43(11):7121-6813. doi:10.1038/ng.955.

## 2021-06-21 NOTE — LETTER
"Letter by Fidelina Poe, Genetic Counselor at      Author: Fidelina Poe, Genetic Counselor Service: -- Author Type: --    Filed:  Encounter Date: 4/6/2021 Status: (Other)         Xiao Beal  [address]  [place] [address]      April 6, 2021      Dear Ms. Beal,    It was a pleasure speaking with you on the phone on 4/6/2021.  Here is a copy of the progress note from our discussion.  If you have any additional questions, please feel free to call.    Referring Provider: Dr. Randall    Presenting Information:  I spoke to Xiao by phone today to discuss her genetic testing results. Her blood was drawn on 02/05/2021. BRCANext-Expanded testing was ordered  from [facility]. This testing was done because of Xiao's personal history of breast cancer.    Genetic Testing Result: NEGATIVE  Xiao is negative for mutations in the EZIO, BARD1, BRCA1, BRCA2, BRIP1, CDH1, CHEK2, DICER1, EPCAM, MLH1, MSH2, MSH6, NBN, NF1, PALB2, PMS2, PTEN, RAD51C, RAD51D, RECQL, SMARCA4, STK11, and TP53 genes. No mutations were found in any of the 23 genes analyzed. This test involved sequencing and deletion/duplication analysis of all genes with the exceptions of EPCAM (deletions/duplications only).    Testing did not detect an identifiable mutation associated with Hereditary Breast and Ovarian Cancer syndrome (BRCA1, BRCA2), Hardin syndrome (MLH1, MSH2, MSH6, PMS2, EPCAM), Hereditary Diffuse Gastric Cancer (CDH1), Cowden syndrome (PTEN), Li Fraumeni syndrome (TP53), Peutz-Jeghers syndrome (STK11), or Neurofibromatosis type 1 (NF1).    A copy of the test report can be found in the Media tab and named \"[facility]\". The report is scanned in as a linked document.    Interpretation:  We discussed several different interpretations of this negative test result.    1. One explanation may be that there is a different gene or combination of genes and environment that are associated with the cancers in Xiao and/or her relatives.  "   2. There is also a small possibility that there is a mutation in one of these genes, and we could not find it with our current testing methods.       Screening:  Based on this negative test result, it is important for Xiao and her relatives to refer back to the family history for appropriate cancer screening.      Xiao should continue to follow her oncology team's recommendations for the treatment and follow-up for her breast cancer history.     Coreen close female relatives remain at increased risk for breast cancer given their family history. Breast cancer screening is generally recommended to begin approximately 10 years younger than the earliest age of breast cancer diagnosis in the family, or at age 40, whichever comes first. Breast screening options should be discussed with an individual's primary care provider and a genetic counselor, to determine at what age to begin screening, what screening is appropriate, and if additional screening (such as breast MRI) is necessary based on personal/family history factors.     Other population cancer screening options, such as those recommended by the American Cancer Society and the National Comprehensive Cancer Network (NCCN), are also appropriate for Xiao and her family. These screening recommendations may change if there are changes to Xiao's personal and/or family history. Final screening recommendations should be made by each individual's managing physician.      Inheritance:  We reviewed the autosomal dominant inheritance of mutations in these 23 genes.  We discussed that Xiao cannot/did not pass on an identifiable mutation in these genes to her children based on this test result.  Mutations in these genes do not skip generations.      Additional Testing Considerations:  No further genetic is recommended for Xiao or her family at this time. Xiao was encouraged to contact me should her personal or family history change as this may change genetic testing  recommendations.    Summary:  We do not have an explanation for Xiao's personal history of breast cancer.  Because of that, it is important that she continue with cancer screening based on her personal and family history as discussed above.    Genetic testing is rapidly advancing, and new cancer susceptibility genes will most likely be identified in the future.  Therefore, I encouraged Xiao to contact me annually or if there are changes in her personal or family history.  This may change how we assess her cancer risk, screening, and the testing we would offer.    Plan:  1. A copy of the test results will be mailed to Xiao.  2. She plans to follow-up with her oncology team for treatment and follow-up for her breast cancer history and her primary care providers for routine care.  3. She should contact me annually, or sooner if her family history changes.    If Xiao has any further questions, I encouraged her to contact me at [phone].    Fidelina Poe MS, [facility]  Licensed Genetic Counselor  [facility]  [phone]

## 2021-06-21 NOTE — LETTER
Letter by Edie Stubbs CMA at      Author: Edie Stubbs CMA Service: -- Author Type: --    Filed:  Encounter Date: 2/9/2021 Status: (Other)       Xiao - Surgery Details    We've received instruction to get you scheduled for Surgery with Dr Desire Kent. We have that arranged as follows:     Surgery Date: Wednesday February 19th    Location:    [facility] [address] (3rd Floor)                       [address] [place] [id number]     Arrival Time: 10:00 AM (unless instructed otherwise by the pre-op nurse)    Prep Instructions:     1. COVID19 testing is required within 4 days of surgery. We have this scheduled for you at [facility], [address] [place] [address], 1st Floor on Tuesday Feb. 16th at 1:00 PM. Follow the specific instructions you receive by Juan. If your test is positive, your surgery will be canceled.     2. Nothing to eat or drink for 8 hours before surgery unless instructed differently by the pre-op nurse.    3. No blood thinners including aspirin for one week prior to surgery. Verify this is safe for you with your primary care doctor before stopping.     4. You need an adult to drive you home and stay with you 24 hours after surgery.     5. One adult visitor may accompany the surgical patient preoperatively. No other visitors are allowed at the facility or in the building.    6. When you arrive to the surgery center, you will be screened for COVID19 symptoms. If you screen positive, your surgery will need to be postponed for your safety.    7. If the community sees a new surge in COVID19 hospital admissions, your procedure may need to be postponed. We will contact you if this happens.    8. We always encourage you to notify your insurance any time you have something scheduled including surgery. The number is usually right on the back of your insurance card. Please call [facility] Cost of Care at [phone] for the surgeon fees, and [facility] Cost of Care [phone] for facility fees if you need pricing information.     Call our office if you have any questions! Thank you!       Edie KRISHNAN  Surgery Scheduler  [facility]  Surgery [facility]  Direct line: [phone]

## 2021-06-21 NOTE — LETTER
Letter by Gunjan Storey RN at      Author: Gunjan Storey RN Service: -- Author Type: --    Filed:  Encounter Date: 5/5/2021 Status: (Other)         May 5, 2021     Patient: Xiao Beal   YOB: 1985   Date of Visit: 5/5/2021       To Whom It May Concern:    It is my medical opinion that Xiao Beal should reduce work hours to 4 hours per day until radiation ends on 5/28 and will still need 4 weeks recovery.      If you have any questions or concerns, please don't hesitate to call.    Sincerely,        Electronically signed by Iza Espinoza MD

## 2021-06-21 NOTE — LETTER
Letter by Fidelina Poe, Genetic Counselor at      Author: Fidelina Poe, Genetic Counselor Service: -- Author Type: --    Filed:  Encounter Date: 4/6/2021 Status: (Other)       Negative Genetic Test Result    Genetic Testing  You had a blood test that looked at the genetic information in one or more genes associated with increased cancer risk.  The testing looked for any harmful changes that would stop this particular gene from working like it should. If an individual does not have any harmful changes or variants of unknown significance found from their blood test, their genetic test result is reported as negative.       Results  The genetic test did not identify any pathogenic (harmful) changes in the genes that were tested. There are several possible explanations for a negative test result. Without knowing the gene mutation in your family, the cause of the cancer in you or your relatives is still unknown. Your genetic counselor can help interpret the result for you and your relatives. In this case, there are several reasons that may explain the negative test result:    There may be a gene mutation in the family that you did not inherit.     You may have a gene mutation in a different gene that was not included in the test, or has not yet been discovered.     The cancers in you or your family may be due to a combination of genetic factors and environment (multifactorial/familial).    The cancers in you or your family may be sporadic/random cancers.    There is very small chance that a mutation was not found by current testing methods.  As testing technology evolves over time, it may still be possible to identify a mutation in a gene that was not found on this test.    It is important to note which genes were included in your test. A list of these genes can be found on your test result.    Screening Recommendations  Due to this negative test result, cancer screening recommendations should be based on your  personal and family history. This may include increased cancer screening for you and/or your family members. Your genetic counselor and health care provider can help make appropriate recommendations.      Please call us if you have any questions or concerns.   [facility] Fidelina Poe MS, [facility]  [phone]

## 2021-06-23 ENCOUNTER — HOSPITAL ENCOUNTER (OUTPATIENT)
Dept: CT IMAGING | Facility: CLINIC | Age: 36
Discharge: HOME | End: 2021-06-23
Attending: INTERNAL MEDICINE
Payer: COMMERCIAL

## 2021-06-23 DIAGNOSIS — R07.2 PRECORDIAL PAIN: ICD-10-CM

## 2021-06-23 LAB
CREAT BLD-MCNC: 0.7 MG/DL (ref 0.6–1.1)
GFR SERPL CREATININE-BSD FRML MDRD: >60 ML/MIN/1.73M2

## 2021-06-24 ENCOUNTER — OFFICE VISIT - HEALTHEAST (OUTPATIENT)
Dept: FAMILY MEDICINE | Facility: CLINIC | Age: 36
End: 2021-06-24

## 2021-06-24 DIAGNOSIS — Z11.59 ENCOUNTER FOR HEPATITIS C SCREENING TEST FOR LOW RISK PATIENT: ICD-10-CM

## 2021-06-24 DIAGNOSIS — Z86.15 HISTORY OF LATENT TUBERCULOSIS: ICD-10-CM

## 2021-06-24 DIAGNOSIS — Z11.4 SCREENING FOR HIV WITHOUT PRESENCE OF RISK FACTORS: ICD-10-CM

## 2021-06-24 DIAGNOSIS — Z86.15 PERSONAL HISTORY OF LATENT TUBERCULOSIS INFECTION: ICD-10-CM

## 2021-06-24 DIAGNOSIS — Z01.84 IMMUNITY STATUS TESTING: ICD-10-CM

## 2021-06-24 DIAGNOSIS — Z00.00 ROUTINE GENERAL MEDICAL EXAMINATION AT A HEALTH CARE FACILITY: ICD-10-CM

## 2021-06-24 DIAGNOSIS — C50.412 MALIGNANT NEOPLASM OF UPPER-OUTER QUADRANT OF LEFT BREAST IN FEMALE, ESTROGEN RECEPTOR POSITIVE (H): ICD-10-CM

## 2021-06-24 DIAGNOSIS — Z13.220 LIPID SCREENING: ICD-10-CM

## 2021-06-24 DIAGNOSIS — Z17.0 MALIGNANT NEOPLASM OF UPPER-OUTER QUADRANT OF LEFT BREAST IN FEMALE, ESTROGEN RECEPTOR POSITIVE (H): ICD-10-CM

## 2021-06-24 ASSESSMENT — MIFFLIN-ST. JEOR: SCORE: 1316.38

## 2021-06-25 ENCOUNTER — AMBULATORY - HEALTHEAST (OUTPATIENT)
Dept: LAB | Facility: CLINIC | Age: 36
End: 2021-06-25

## 2021-06-25 DIAGNOSIS — Z11.59 ENCOUNTER FOR HEPATITIS C SCREENING TEST FOR LOW RISK PATIENT: ICD-10-CM

## 2021-06-25 DIAGNOSIS — Z13.220 LIPID SCREENING: ICD-10-CM

## 2021-06-25 DIAGNOSIS — Z01.84 IMMUNITY STATUS TESTING: ICD-10-CM

## 2021-06-25 DIAGNOSIS — Z00.00 ROUTINE GENERAL MEDICAL EXAMINATION AT A HEALTH CARE FACILITY: ICD-10-CM

## 2021-06-25 LAB
ALBUMIN SERPL-MCNC: 4.1 G/DL (ref 3.5–5)
ALP SERPL-CCNC: 91 U/L (ref 45–120)
ALT SERPL W P-5'-P-CCNC: 15 U/L (ref 0–45)
ANION GAP SERPL CALCULATED.3IONS-SCNC: 10 MMOL/L (ref 5–18)
AST SERPL W P-5'-P-CCNC: 17 U/L (ref 0–40)
BILIRUB SERPL-MCNC: 0.8 MG/DL (ref 0–1)
BUN SERPL-MCNC: 9 MG/DL (ref 8–22)
CALCIUM SERPL-MCNC: 9.7 MG/DL (ref 8.5–10.5)
CHLORIDE BLD-SCNC: 106 MMOL/L (ref 98–107)
CHOLEST SERPL-MCNC: 212 MG/DL
CO2 SERPL-SCNC: 27 MMOL/L (ref 22–31)
CREAT SERPL-MCNC: 0.72 MG/DL (ref 0.6–1.1)
ERYTHROCYTE [DISTWIDTH] IN BLOOD BY AUTOMATED COUNT: 12.3 % (ref 11–14.5)
FASTING STATUS PATIENT QL REPORTED: YES
GFR SERPL CREATININE-BSD FRML MDRD: >60 ML/MIN/1.73M2
GLUCOSE BLD-MCNC: 87 MG/DL (ref 70–125)
HCT VFR BLD AUTO: 41 % (ref 35–47)
HCV AB SERPL QL IA: NEGATIVE
HDLC SERPL-MCNC: 54 MG/DL
HGB BLD-MCNC: 13.6 G/DL (ref 12–16)
LDLC SERPL CALC-MCNC: 140 MG/DL
MCH RBC QN AUTO: 31.3 PG (ref 27–34)
MCHC RBC AUTO-ENTMCNC: 33.2 G/DL (ref 32–36)
MCV RBC AUTO: 95 FL (ref 80–100)
PLATELET # BLD AUTO: 228 THOU/UL (ref 140–440)
PMV BLD AUTO: 9.5 FL (ref 7–10)
POTASSIUM BLD-SCNC: 4.2 MMOL/L (ref 3.5–5)
PROT SERPL-MCNC: 7.2 G/DL (ref 6–8)
RBC # BLD AUTO: 4.34 MILL/UL (ref 3.8–5.4)
SODIUM SERPL-SCNC: 143 MMOL/L (ref 136–145)
TRIGL SERPL-MCNC: 89 MG/DL
WBC: 4.6 THOU/UL (ref 4–11)

## 2021-06-25 NOTE — PROGRESS NOTES
RADIATION ONCOLOGY WEEKLY TREATMENT VISIT NOTE    Assessment:     1. Invasive ductal carcinoma of breast, left (H)           Impression/Plan:   36 y.o. female with grade II left breast IDC.    1. Continue skin cares with Radiaplex + Aquaphor x3 daily.    2. Activity modification PRN fatigue.    3. Long term follow up with medical oncology.    4. Return in 4-6 weeks for routine office visit with Dr. Espinoza.     Radiation: Site: Left breast  Stereotactic Radiosurgery: No  Stereotactic Radiosurgery: No  Concurrent Therapy: No  Today's Dose: 6040  Total Dose for Breast: 6040  Today's Fraction/Total Fraction Breast:       Subjective:     HPI: Xiao Beal is a 36 y.o. female with    1. Invasive ductal carcinoma of breast, left (H)         The following portions of the patient's history were reviewed and updated as appropriate: allergies, current medications, past family history, past medical history, past social history, past surgical history and problem list.    Assessment                  Body Site: Breast                           Site: Left breast  Stereotactic Radiosurgery: No  Concurrent Therapy: No  Today's Dose: 6040  Total Dose for Breast: 6040  Today's Fraction/Total Fraction Breast:   Drainage: 0: Absent                      Emotional Alteration Copin: Effective  Comfort Alteration KPS: 80% Can perform normal activity with effort, some signs of disease  Fatigue (ONS scale) : 7: Extreme Fatigue  Pain Location: denies   Nutrition Alteration Anorexia: 0: None  Nausea: 0: None  Vomitin: None  Skin Alteration Skin Sensation: 1:Pruitis  Skin Reaction: 3: Dry desquamation with or without erythema      Objective:     Exam:     There were no vitals filed for this visit.    Wt Readings from Last 8 Encounters:   21 146 lb 9.6 oz (66.5 kg)   21 147 lb (66.7 kg)   05/10/21 146 lb 8 oz (66.5 kg)   21 146 lb (66.2 kg)   21 146 lb 4.8 oz (66.4 kg)   21 146 lb 6.4 oz (66.4 kg)    04/15/21 147 lb (66.7 kg)   04/12/21 144 lb 12.8 oz (65.7 kg)       General: Alert and oriented. Sitting comfortably.   Patient non acute appearing, asymptomatic. Mask on. No cough, no respiratory distress.   Xiao has hyperpigmentation to left breast.     Treatment Summary to Date    Aria chart and setup information reviewed    IConsuelo MD personally performed the services described in this documentation, as scribed by Darin Smith in my presence, and it is both accurate and complete.    Signed by: Consuelo Dumas MD, MPH

## 2021-06-25 NOTE — PROGRESS NOTES
Radiation Treatment Summary    Patient: Xiao Beal   MRN: [id number]  : 1985  Care Provider: Iza Espinoza    Date of Service: 2021        Cristy Kenny MD  [address]  [place] [address]           Dear Dr. Kenny:     Your patient Mrs. Xiao Beal completed her radiation therapy on 2021. As you know Ms. Beal is a 36 y.o. female with a diagnosis of left breast cancer, pathologic stage T2 N1 aM0, ER/UT positive, HER-2 negative, Oncotype DX score 17, status post lumpectomy and sentinel lymph node/limited axillary lymph node resection with negative margin.   removed axillary lymph nodes showed evidence of metastatic disease with largest metastatic focus measured 9 x 5 mm with extensive ERIC up to 6 mm in distance.  The patient received postop radiation therapy for her breast cancer with a total dose of 5040 cGy in 28 treatments targeted to the left breast/chest wall and regional lymph nodes followed by additional 1000 Gy in 5 fractions given to the primary tumor bed using electron.  Her radiation therapy was given from 2021-2021.  She tolerated ration therapy reasonably well with expected acute side effect.  The patient is scheduled to return to radiation oncology in 4 weeks for routine post therapy office follow-up.    Again, thank you very much for the referral and allowing me to participate in the care of this patient.  If you have any questions or concerns about this consultation, please do not hesitate to call.           Sincerely,      Iza Espinoza MD, PhD  Department of Radiation Oncology   [facility]  Tel: [phone]  Page: [phone]    [facility]  [address] [place] [address]     [facility]  [address] [place] [address]    CC:  Patient Care Team:  Emmanuel Peterson MD as PCP - General (Family Medicine)  Emmanuel Peterson MD as Assigned PCP  Cristy Kenny MD as Physician (Hematology)  Desire Kent MD as  Physician (General Surgery)  Iza Espinoza MD as Physician (Radiation Oncology)  Sharita Lieberman RN as Oncology Nurse Navigator (Hematology and Oncology)  Iza Espinoza MD as Assigned Cancer Care Provider  Dennis Celestin (Vargas), MD as Assigned Heart and Vascular Provider

## 2021-06-25 NOTE — TELEPHONE ENCOUNTER
Pt called to check on s/p RT, no answer, LM that this is a courtesy call and to call if any questions or concerns. Reminded pt of follow up appointment.

## 2021-06-26 NOTE — PROGRESS NOTES
Here for CTA heart rate in 80's, but BP 84/51, asymptomatic, states her BP always runs low.  Contacted Dr Munoz, instructed to proceed with images without giving any medications. Images obtained, tolerated contrast without complaint. Post procedure instructions given, verbalized understanding. Post imaging /60, asymptomatic.

## 2021-06-26 NOTE — PATIENT INSTRUCTIONS - HE
Schedule fasting blood work on your way out at the .  We will test basic blood work along with hepatitis B titers.    We will print you out a copy of your vaccines.    Update pneumonia shot today.

## 2021-06-27 NOTE — PROGRESS NOTES
"Progress Notes by Alison El AuD at 6/5/2019  3:30 PM     Author: Alison El AuD Service: -- Author Type: Audiologist    Filed: 6/6/2019  3:19 PM Encounter Date: 6/5/2019 Status: Signed    : Alison El AuD (Audiologist)       Audiology Report    Referring Provider:  Dr. Estrada    Summary: Audiology visit completed. Please see audiogram below or in \"media\" tab for case history and results.  Xiao Beal is seen in conjunction with ENT appointment today.     Results:   Transducer: Circumaural headphones    Reliability was good  and there was good  SRT to PTA agreement.       Plan:  The patient is returned to ENT for follow up.  She should return for retesting with ENT recommendation.  The patient is a borderline candidate for hearing aids and should consider pending medical clearance.     Marni Heaton, CCC-A  [place] Licensed Audiologist #[id number]                 "

## 2021-06-28 LAB — HBV SURFACE AB SERPL IA-ACNC: POSITIVE M[IU]/ML

## 2021-06-30 NOTE — PROGRESS NOTES
"Progress Notes by Dennis Celestin MD (Ted) at 5/19/2021 10:50 AM     Author: Dennis Celestin MD (Ted) Service: -- Author Type: Physician    Filed: 5/19/2021  1:30 PM Encounter Date: 5/19/2021 Status: Signed    : Dennis Celestin MD (Ted) (Physician)           Cardiology Progress Note    Assessment:  Chest pain, probably related to radiation injury  Heart palpitation, uncertain etiology  Left breast cancer status post lumpectomy and ongoing radiation therapy    Plan:  It will be difficult for her to wear event monitor as long as she receives radiation treatment.  She owns an apple watch she will use it as a form of heart rhythm monitoring.    We will get CT heart to assess radiation-induced cardiac injury    Subjective:   This is 36 y.o. female who comes in today for second opinion.  She saw Dr. Zhong last month because of heart palpitations and chest pain.  Her symptoms developed after she began receiving radiation treatment for locally metastatic breast carcinoma.  The chest pain is nonpleuritic.  There is no exertional features.  She is not tender to palpations of the chest.  She has never had chest pains like that before.  She has felt irregular beating of the heart but denies prolonged heart racing or syncope.  She has no history of known coronary artery disease, valvular heart disease, cardiomyopathy.    Review of Systems:   General: WNL  Eyes: WNL  Ears/Nose/Throat: WNL  Lungs: WNL  Heart: Chest Pain, Shortness of Breath with activity  Stomach: WNL  Bladder: WNL  Muscle/Joints: WNL  Skin: WNL  Nervous System: WNL  Mental Health: WNL     Blood: WNL    Objective:   /66 (Patient Site: Right Arm, Patient Position: Sitting, Cuff Size: Adult Regular)   Pulse 92   Resp 14   Ht 5' 3\" (1.6 m)   Wt 147 lb (66.7 kg)   BMI 26.04 kg/m    Physical Exam:  GENERAL: no distress  NECK: No JVD  LUNGS: Clear to auscultation.  CARDIAC: regular rhythm, S1 & S2 normal.  No heaves, " thrills, gallops or murmurs.  Skin erythema left upper chest and breast  ABDOMEN: flat, negative hepatosplenomegaly, soft and non-tender.  EXTREMITIES: No evidence of cyanosis, clubbing or edema.    Current Outpatient Medications   Medication Sig Dispense Refill   ? cholecalciferol, vitamin D3, (VITAMIN D3) 5,000 unit Tab Take by mouth.      ? acetaminophen (TYLENOL) 325 MG tablet Take 650 mg by mouth every 6 (six) hours as needed for pain.     ? ibuprofen (ADVIL,MOTRIN) 200 MG tablet Take 2 tablets (400 mg total) by mouth every 6 (six) hours as needed for pain. 30 tablet 0   ? letrozole (FEMARA) 2.5 mg tablet To start after done with RT       No current facility-administered medications for this visit.        Cardiographics:    EC2021 normal sinus rhythm within normal limits      Lab Results:       Lab Results   Component Value Date    CHOL 192 2019    CHOL 180 2011     Lab Results   Component Value Date    HDL 57 2019    HDL 47 2011     Lab Results   Component Value Date    LDLCALC 122 2019    LDLCALC 116 2011     Lab Results   Component Value Date    TRIG 63 2019    TRIG 83 2011     No results found for: BNP    Dennis (Jasper Celestin MD

## 2021-06-30 NOTE — PROGRESS NOTES
Progress Notes by Clinton Zhong MD at 4/15/2021 12:50 PM     Author: Clinton Zhong MD Service: -- Author Type: Physician    Filed: 4/15/2021  1:34 PM Encounter Date: 4/15/2021 Status: Signed    : Clinton Zhong MD (Physician)           Thank you, Dr. Peterson, for advising Xiao DEENA Beal to meet with me in consultation today at the [facility] to evaluate palpitations.     Assessment:    1. Palpitations  JUAN MANUEL Monitor Hook-Up    Renal function panel    Magnesium       Plan:    1. 2-week patient activated monitor; we will call her with the results and provide further advice at that time.    An After Visit Summary was printed and given to the patient.    Current History:    She has had unpredictable tachy palpitations for the last three months.  The episodes happen about once a week. They do not cause her to stop her activities. She does not experience light headedness or presyncope. They last for several minutes.  She does not have associated symptoms such as nausea, diaphoresis or dyspnea.    She states she is under significant personal stress.  She was a no-show for this same consultation visit last week.  She is receiving radiation therapy for intraductal breast cancer.  Her  committed suicide in 2016.  She lives with her 2 daughters.  She tells me her parents falsified her birthdate when they came to the [place] as refugees.  She believes she is around 49 years of age.  She is afraid that if her birthdate is corrected she will lose her benefits and privileges in the [place].    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    Patient Active Problem List   Diagnosis   ? Common Migraine (Without Aura)   ? Abnormal glandular Papanicolaou smear of cervix/ NORMAL 3/2018- Plan cotest in 3 years   ? Invasive ductal carcinoma of breast, left (H)       Past Medical History:  Past Medical History:   Diagnosis  Date   ? History of colonic polyps 2021   ? Invasive ductal carcinoma of breast, left (H) 2021   ? Patellofemoral Syndrome        Past Surgical History:  Past Surgical History:   Procedure Laterality Date   ? BREAST BIOPSY Left     benign   ? BREAST BIOPSY Right     benign   ? COLONOSCOPY     ? CT  DELIVERY ONLY N/A    ? CT MASTECTOMY, PARTIAL Left 2021    Left Lumpectomy; [place] Lymph Node Biopsy;  Surgeon: Desire Kent MD;  Location: [facility];  Service: General   ? US BIOPSY CORE LYMPH NODE (BREAST) LEFT Left 2021   ? US BREAST CORE BIOPSY LEFT Left 2021   ? US BREAST CORE BIOPSY RIGHT Right 2021   ? US BREAST CORE BIOPSY RIGHT Right 2021   ? US SENTINEL NODE INJECTION  2021       Family History:  Family History   Problem Relation Age of Onset   ? Diverticulitis Mother    ? Lung cancer Father    ? No Medical Problems Sister    ? No Medical Problems Brother    ? No Medical Problems Daughter    ? No Medical Problems Daughter    ? No Medical Problems Sister    ? No Medical Problems Brother    ? CABG Neg Hx    ? ICD Neg Hx    ? Pacemaker Neg Hx    ? Sudden death Neg Hx        Social History:   reports that she has never smoked. She has never used smokeless tobacco. She reports that she does not drink alcohol or use drugs.  Social History     Socioeconomic History   ? Marital status:      Spouse name: Not on file   ? Number of children: 2   ? Years of education: Not on file   ? Highest education level: Not on file   Occupational History   ? Occupation: [employer] at [employer]     Employer: [employer]   Social Needs   ? Financial resource strain: Not on file   ? Food insecurity     Worry: Not on file     Inability: Not on file   ? Transportation needs     Medical: Not on file     Non-medical: Not on file   Tobacco Use   ? Smoking status: Never Smoker   ? Smokeless tobacco: Never Used   Substance and Sexual  Activity   ? Alcohol use: No   ? Drug use: No   ? Sexual activity: Not Currently     Partners: Male   Lifestyle   ? Physical activity     Days per week: Not on file     Minutes per session: Not on file   ? Stress: Not on file   Relationships   ? Social connections     Talks on phone: Not on file     Gets together: Not on file     Attends Episc[employer]alian service: Not on file     Active member of club or organization: Not on file     Attends meetings of clubs or organizations: Not on file     Relationship status: Not on file   ? Intimate partner violence     Fear of current or ex partner: Not on file     Emotionally abused: Not on file     Physically abused: Not on file     Forced sexual activity: Not on file   Other Topics Concern   ? 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  She is a  and has 2 teenaged children.  Her  committed suicide in 2016.  She does have brothers and sisters and her mom in the area.        Reviewed above.    Patient lives with her 2 daughters in 2021.  She feels that she is in stable phase of life.    Emmanuel Peterson MD  5/1/2019        She was born in 1972 per her mother.            Medications:  Outpatient Encounter Medications as of 4/15/2021   Medication Sig Dispense Refill   ? acetaminophen (TYLENOL) 325 MG tablet Take 650 mg by mouth every 6 (six) hours as needed for pain.     ? cholecalciferol, vitamin D3, (VITAMIN D3) 5,000 unit Tab Take by mouth.      ? ibuprofen (ADVIL,MOTRIN) 200 MG tablet Take 2 tablets (400 mg total) by mouth every 6 (six) hours as needed for pain. 30 tablet 0   ? letrozole (FEMARA) 2.5 mg tablet To start after done with RT       No facility-administered encounter medications on file as of 4/15/2021.        Allergies:  Patient has no known allergies.    Review of Systems:     General: WNL  Eyes: WNL  Ears/Nose/Throat: WNL  Lungs: WNL  Heart: Irregular Heartbeat(Palpitations)  Stomach: WNL  Bladder:  "WNL  Muscle/Joints: WNL  Skin: WNL  Nervous System: WNL  Mental Health: WNL     Blood: WNL    Objective:     Physical Exam:  Wt Readings from Last 5 Encounters:   04/15/21 147 lb (66.7 kg)   04/12/21 144 lb 12.8 oz (65.7 kg)   03/25/21 143 lb (64.9 kg)   02/19/21 138 lb 5 oz (62.7 kg)   01/28/21 144 lb (65.3 kg)      5' 3\" (1.6 m)  Body mass index is 26.04 kg/m .  BP 96/66 (Patient Site: Right Arm, Patient Position: Sitting, Cuff Size: Adult Regular)   Pulse 77   Resp 16   Ht 5' 3\" (1.6 m)   Wt 147 lb (66.7 kg) Comment: With shoes.  SpO2 98%   BMI 26.04 kg/m      General Appearance: Alert and not in distress   HEENT: No scleral icterus; the tongue was not examined as she is wearing a mask due to Covid restrictions   Neck: No cervical bruits, adenopathy, or thyromegaly; jugular venous pressure is less than 5 cm   Chest: The spine is straight and the chest is symmetric   Lungs: Respirations are unlabored; the lungs are clear to auscultation   Cardiovasular: Auscultation reveals normal first and second heart sounds and no murmurs, rubs, or gallops; the carotid, radial, femoral, and posterior tibial pulses are intact   Abdomen: No organomegaly, masses, bruits, or tenderness; bowel sounds are present   Extremities: No cyanosis, clubbing or edema   Skin: No xanthelasma   Neurologic: Mood is concerned and affect is anxious       Cardiac testing:    EKG: Sinus arrhythmia with slight left axis deviation per my personal review.    Imaging:    No results found.    Lab Review:    Lab Results   Component Value Date     04/30/2019    K 4.3 04/30/2019     04/30/2019    CO2 26 04/30/2019    BUN 13 04/30/2019    CREATININE 0.71 04/30/2019    CALCIUM 9.5 04/30/2019     Lab Results   Component Value Date    WBC 8.1 11/26/2013    HGB 13.6 11/26/2013    HCT 39.6 11/26/2013    MCV 95 11/26/2013     11/26/2013     Lab Results   Component Value Date    CHOL 192 04/30/2019    TRIG 63 04/30/2019    HDL 57 04/30/2019 "     Creatinine (mg/dL)   Date Value   04/30/2019 0.71   11/26/2013 0.67   09/10/2012 0.62     LDL Calculated (mg/dL)   Date Value   04/30/2019 122   12/19/2011 116           Much or all of the text in this note was generated through the use of the Dragon Dictate voice-to-text software. Errors in spelling or words which seem out of context are unintentional. Sound alike errors, in particular, may have escaped editing.

## 2021-07-04 NOTE — ADDENDUM NOTE
Addendum Note by Lombardi, Susan L, RN at 2/26/2021  9:00 AM     Author: Lombardi, Susan L, RN Service: -- Author Type: RN, Care Manager    Filed: 2/26/2021 12:43 PM Date of Service: 2/26/2021  9:00 AM Status: Signed    : Lombardi, Susan L, RN (RN, Care Manager)    Encounter addended by: Lombardi, Susan L, RN on: 2/26/2021 12:43 PM      Actions taken: Clinical Note Signed, Charge Capture section accepted

## 2021-07-04 NOTE — ADDENDUM NOTE
Addendum Note by Iza Wolf MD at 3/26/2021  1:00 PM     Author: Iza Wolf MD Service: -- Author Type: Physician    Filed: 3/26/2021  1:52 PM Encounter Date: 3/26/2021 Status: Signed    : Iza Wolf MD (Physician)    Addended by: IZA WOLF on: 3/26/2021 01:52 PM        Modules accepted: Orders

## 2021-07-06 VITALS
BODY MASS INDEX: 25.87 KG/M2 | HEART RATE: 92 BPM | HEIGHT: 63 IN | OXYGEN SATURATION: 98 % | DIASTOLIC BLOOD PRESSURE: 51 MMHG | SYSTOLIC BLOOD PRESSURE: 105 MMHG | WEIGHT: 146 LBS

## 2021-07-06 VITALS — WEIGHT: 146.6 LBS | BODY MASS INDEX: 25.97 KG/M2

## 2021-07-08 ENCOUNTER — ONCOLOGY VISIT (OUTPATIENT)
Dept: ONCOLOGY | Facility: CLINIC | Age: 36
End: 2021-07-08
Attending: INTERNAL MEDICINE
Payer: COMMERCIAL

## 2021-07-08 VITALS
DIASTOLIC BLOOD PRESSURE: 65 MMHG | OXYGEN SATURATION: 100 % | BODY MASS INDEX: 25.6 KG/M2 | WEIGHT: 144.5 LBS | RESPIRATION RATE: 16 BRPM | HEART RATE: 69 BPM | TEMPERATURE: 98.9 F | SYSTOLIC BLOOD PRESSURE: 101 MMHG

## 2021-07-08 DIAGNOSIS — C50.812 MALIGNANT NEOPLASM OF OVERLAPPING SITES OF LEFT BREAST IN FEMALE, ESTROGEN RECEPTOR POSITIVE (H): Primary | ICD-10-CM

## 2021-07-08 DIAGNOSIS — Z17.0 MALIGNANT NEOPLASM OF OVERLAPPING SITES OF LEFT BREAST IN FEMALE, ESTROGEN RECEPTOR POSITIVE (H): Primary | ICD-10-CM

## 2021-07-08 DIAGNOSIS — Z79.811 USE OF AROMATASE INHIBITORS: ICD-10-CM

## 2021-07-08 PROCEDURE — 99214 OFFICE O/P EST MOD 30 MIN: CPT | Performed by: INTERNAL MEDICINE

## 2021-07-08 PROCEDURE — G0463 HOSPITAL OUTPT CLINIC VISIT: HCPCS

## 2021-07-08 ASSESSMENT — PAIN SCALES - GENERAL: PAINLEVEL: NO PAIN (0)

## 2021-07-08 NOTE — LETTER
7/8/2021         RE: Xiao Beal  [address]  [place] [address]        Dear Colleague,    Thank you for referring your patient, Xiao Beal, to the [facility]. Please see a copy of my visit note below.    July 8, 2021      Medical Oncology Follow-up    Reason for visit: left breast cancer     HPI:  This is a 36 y.o. Female (older than her stated age likely in her 50s based on immigration and change of age), with T2N1a left breast cancer, ER positive. This was picked up by the patient. She noticed an indentation or dimpling of her breast several months ago. She brought it up to her physician when she was seen because of neck and arm pain after a Covid vaccine. She underwent a mammogram and ultrasound. A needle biopsy was done which shows an invasive ductal carcinoma. It is estrogen receptor positive 95%, progesterone receptor positive weakly 3% and HER-2 negative 0 by IHC.  R breast revealed two fibroadenomas.  L breast - 11x13 mm mass in the 1 oclock position 9 cm from the nipple.  Lymph nodes appeared normal.       She met with Dr Kent.  She underwent a lumpectomy with SNLB.      Oncotype Dx score returned at 17 giving her a risk of metastatic disease of 15% at 10 years.  No benefit from chemotherapy. I recommended adjuvant radiation and adjuvant endocrine therapy. Lab work confirmed postmenopausal (she told me her age is not accurate in the computer).     Interval History: Xiao is here today for follow up.     She completed radiation treatment at [facility] 6/1/21.  She has some skin irritation and fatigue. Also notes some hot flashes.     She has many questions today about benefits/risks of aromatase inhibitors, ER positive breast cancer pathology in general, DEXA scan review.     She just started the AI and has noticed hot flashes.  Her skin is healing.    She has worked with PT.     ROS: 10 point ROS neg other than the symptoms noted above in the HPI.      Past  Medical History:   Diagnosis Date     Abnormal glandular Papanicolaou smear of cervix     confusing info related to pap     Breast cancer (H)      Previous  delivery, antepartum condition or complication 2005     Current Outpatient Medications   Medication     letrozole (FEMARA) 2.5 MG tablet     vitamin D3 (CHOLECALCIFEROL) 125 MCG (5000 UT) tablet     atovaquone-proguanil (MALARONE) 250-100 MG per tablet     letrozole (FEMARA) 2.5 MG tablet     NO ACTIVE MEDICATIONS     ondansetron (ZOFRAN ODT) 4 MG ODT tab     No current facility-administered medications for this visit.        PHYSICAL EXAM:  /65   Pulse 69   Temp 98.9  F (37.2  C) (Oral)   Resp 16   Wt 65.5 kg (144 lb 8 oz)   SpO2 100%   BMI 25.60 kg/m    General: Alert, oriented, pleasant, NAD  HEENT: Normocephalic, atraumatic, no icterus.   NECK: supple, no LAD  CV: rrr  Lungs: clear  Abd: soft, nt, nd +bs  Breast: Inspection done only. Skin has mild erythema and hyperpigmentation.   Ext: no edema    Reviewed her dexa scan      A/P:  37 y/o female with a pT2N1a left breast cancer s/p lumpectomy and SNLB.  The tumor is ER + essentially MS negative, her2 negative.    Left breast cancer: S/p lumpectomy + SLNB s/p adjuvant radiation. She is tolerating letrozole okay with expected side effects. Reviewed timeline of fatigue. Encouraged her to continue aquaphor BID and use silvadene as prescribed by rad onc.  I reviewed letrozole MOA and common side effects including loss of bone density over time, hot flashes, vaginal dryness, joint stiffness.      Bone health: DEXA scan was reviewed showing osteopenia. Discussed calcium and vitamin D and weight bearing exercise. We discussed bisphosphonates as prophylaxis.  We will rediscuss again in the fall; pt would like to think about it.    Follow up in 3 months with ZANDER and 6 months with me with new mammogram    Cristy Kenny MD

## 2021-07-08 NOTE — NURSING NOTE
"Oncology Rooming Note    July 8, 2021 12:13 PM   Xiao Beal is a 36 year old female who presents for:    Chief Complaint   Patient presents with     Oncology Clinic Visit     INVASIVE DUCTAL CARCINOMA OF BREAST     Initial Vitals: /65   Pulse 69   Temp 98.9  F (37.2  C) (Oral)   Resp 16   Wt 65.5 kg (144 lb 8 oz)   SpO2 100%   BMI 25.60 kg/m   Estimated body mass index is 25.6 kg/m  as calculated from the following:    Height as of 6/24/21: 1.6 m (5' 3\").    Weight as of this encounter: 65.5 kg (144 lb 8 oz). Body surface area is 1.71 meters squared.  No Pain (0) Comment: Data Unavailable   No LMP recorded.  Allergies reviewed: Yes  Medications reviewed: Yes    Medications: Medication refills not needed today.  Pharmacy name entered into EPIC:    [facility] [place], [place] - [address] [id number] - [place], [place] - [address] [facility]  AT [facility] [address]  [facility] #[id number] - [place], [place] - [address] AT [facility] [address]    Clinical concerns: None.       Radha Hare MA            " done

## 2021-07-08 NOTE — PROGRESS NOTES
2021      Medical Oncology Follow-up    Reason for visit: left breast cancer     HPI:  This is a 36 y.o. Female (older than her stated age likely in her 50s based on immigration and change of age), with T2N1a left breast cancer, ER positive. This was picked up by the patient. She noticed an indentation or dimpling of her breast several months ago. She brought it up to her physician when she was seen because of neck and arm pain after a Covid vaccine. She underwent a mammogram and ultrasound. A needle biopsy was done which shows an invasive ductal carcinoma. It is estrogen receptor positive 95%, progesterone receptor positive weakly 3% and HER-2 negative 0 by IHC.  R breast revealed two fibroadenomas.  L breast - 11x13 mm mass in the 1 oclock position 9 cm from the nipple.  Lymph nodes appeared normal.       She met with Dr Kent.  She underwent a lumpectomy with SNLB.      Oncotype Dx score returned at 17 giving her a risk of metastatic disease of 15% at 10 years.  No benefit from chemotherapy. I recommended adjuvant radiation and adjuvant endocrine therapy. Lab work confirmed postmenopausal (she told me her age is not accurate in the computer).     Interval History: Xiao is here today for follow up.     She completed radiation treatment at [facility] 21.  She has some skin irritation and fatigue. Also notes some hot flashes.     She has many questions today about benefits/risks of aromatase inhibitors, ER positive breast cancer pathology in general, DEXA scan review.     She just started the AI and has noticed hot flashes.  Her skin is healing.    She has worked with PT.     ROS: 10 point ROS neg other than the symptoms noted above in the HPI.      Past Medical History:   Diagnosis Date     Abnormal glandular Papanicolaou smear of cervix     confusing info related to pap     Breast cancer (H)      Previous  delivery, antepartum condition or complication 2005     Current Outpatient  Medications   Medication     letrozole (FEMARA) 2.5 MG tablet     vitamin D3 (CHOLECALCIFEROL) 125 MCG (5000 UT) tablet     atovaquone-proguanil (MALARONE) 250-100 MG per tablet     letrozole (FEMARA) 2.5 MG tablet     NO ACTIVE MEDICATIONS     ondansetron (ZOFRAN ODT) 4 MG ODT tab     No current facility-administered medications for this visit.        PHYSICAL EXAM:  /65   Pulse 69   Temp 98.9  F (37.2  C) (Oral)   Resp 16   Wt 65.5 kg (144 lb 8 oz)   SpO2 100%   BMI 25.60 kg/m    General: Alert, oriented, pleasant, NAD  HEENT: Normocephalic, atraumatic, no icterus.   NECK: supple, no LAD  CV: rrr  Lungs: clear  Abd: soft, nt, nd +bs  Breast: Inspection done only. Skin has mild erythema and hyperpigmentation.   Ext: no edema    Reviewed her dexa scan      A/P:  37 y/o female with a pT2N1a left breast cancer s/p lumpectomy and SNLB.  The tumor is ER + essentially MS negative, her2 negative.    Left breast cancer: S/p lumpectomy + SLNB s/p adjuvant radiation. She is tolerating letrozole okay with expected side effects. Reviewed timeline of fatigue. Encouraged her to continue aquaphor BID and use silvadene as prescribed by rad onc.  I reviewed letrozole MOA and common side effects including loss of bone density over time, hot flashes, vaginal dryness, joint stiffness.      Bone health: DEXA scan was reviewed showing osteopenia. Discussed calcium and vitamin D and weight bearing exercise. We discussed bisphosphonates as prophylaxis.  We will rediscuss again in the fall; pt would like to think about it.    Follow up in 3 months with ZANDER and 6 months with me with new mammogram    Cristy Kenny MD

## 2021-07-22 NOTE — PROGRESS NOTES
Assessment/ Plan     1. Routine general medical examination at a health care facility  Reviewed and updated patient's past medical, surgical, family, social history, along with current medications and allergies.  Reviewed general healthy living lifestyle guidelines.  She will return for fasting blood work.  Colonoscopy due in 2026 due to history of colon polyp.  Pap smear due 2023. Updated patient's health maintenance as further outlined below.  - Comprehensive Metabolic Panel; Future  - HM2(CBC w/o Differential); Future    2. Malignant neoplasm of upper-outer quadrant of left breast in female, estrogen receptor positive (H)  History of left breast cancer, ER/TN positive, HER-2 negative s/p lumpectomy.  Axillary lymph nodes removal showed metastatic disease so she subsequently underwent post op radiation from 4/12/2021 through 6/2/2021.  Now has plan to begin oral letrozole therapy.  She has a follow-up with oncology on 7/8/2021.    3. History of latent tuberculosis  History of latent TB s/p full course of INH therapy in 2004.  She is asymptomatic from a pulmonary standpoint.  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  We will also prime patient with PCV 13 given history of latent TB and immunosuppression secondary to left breast cancer discussed above.  - Pneumococcal conjugate vaccine 13-valent 6wks-17yrs; >50yrs  - XR Chest 1 View    4. Immunity status testing  Patient received hepatitis B series but schooling requiring hepatitis B titers.  - Hepatitis B Surface Antibody (Anti-HBs) Vaccine Check; Future    5. Lipid screening  - Lipid [facility] FASTING; Future    6. Encounter for hepatitis C screening test for low risk patient  - Hepatitis C Antibody (Anti-HCV); Future    Mahnaz Pacheco DO    Subjective:     Xiao Beal is a  "36 y.o. female who presents for an annual exam.      Other concerns addressed:  Applying to [facility] for EEG     Born in [place] [place] [facility]",     Healthy Habits:   Healthy Diet: Yes  Regular Exercise: Yes, fatigued from radiation  Seat Belt: Yes  Dental Visits Regularly: Yes  ------------------  Last Colonoscopy (50-74yo): 2/2021, polyp found repeat in 5 years  Last Dexa (64 yo+): n/a  Prevention of Osteoporosis (Ca 1200 mg/d, Vit D 1000 IU): Yes  Last annual blood work and any abnormalities?:   Low dose CT (30 pack year 55-74yo q1y): n/a    Gynecologic History  LMP 2 years ago  Last Pap (21-66yo): 3/9/2018. Results were: normal  Last mammogram (40-74yo): 2/26/2021. Per Dr. Iza Espinoza's note \"diagnosis of left breast cancer, pathologic stage T2 N1 aM0, ER/MS positive, HER-2 negative, Oncotype DX score 17, status post lumpectomy and sentinel lymph node/limited axillary lymph node resection with negative margin.  2/6 removed axillary lymph nodes showed evidence of metastatic disease with largest metastatic focus measured 9 x 5 mm with extensive ERIC up to 6 mm in distance.  The patient received postop radiation therapy for her breast cancer with a total dose of 5040 cGy in 28 treatments targeted to the left breast/chest wall and regional lymph nodes followed by additional 1000 Gy in 5 fractions given to the primary tumor bed using electron.  Her radiation therapy was given from 4/12/2021-6/2/2021. \"    Immunization History   Administered Date(s) Administered     COVID-19,PF,Pfizer 12/22/2020, 01/29/2021     Hep A, Adult IM (19yr & older) 04/16/2018     Hep B, Adult 04/04/2002, 06/02/2006     Hep B, historic 07/11/2001, 04/04/2002, 06/02/2006     INFLUENZA,SEASONAL QUAD, PF, =/> 6months 11/10/2017     IPV 04/04/2002     Influenza, Seasonal, Inj PF IIV3 11/03/2011, 11/13/2012, 10/22/2013, 11/18/2015, 10/03/2016     Influenza, inj, historic,unspecified 10/21/2005, 10/15/2010, 10/04/2013     Influenza,seasonal, " Inj IIV3 10/21/2005, 10/15/2010, 10/04/2013     MMR 2001     Meningococcal MCV4P 2018     POLIO, Unspecified 2002     Td, adult adsorbed, PF 2001, 2002     Td,adult,historic,unspecified 2001, 2002, 2013     Tdap 2013     Typhoid, Inj, Inactive 2018     Varicella 2001, 2002, 2002     Yellow Fever, Alternative 2018     Immunization status: due today.     Health Maintenance   Topic Date Due     HEPATITIS C SCREENING  Never done     Pneumococcal Vaccine: Pediatrics (0 to 5 Years) and At-Risk Patients (6 to 64 Years) (1 of 4 - PCV13) Never done     PREVENTIVE CARE VISIT  2020     INFLUENZA VACCINE RULE BASED (Season Ended) 2021 (Originally 2021)     PAP SMEAR  2023     HPV TEST  2023     TD 18+ HE  2023     ADVANCE CARE PLANNING  2024     HIV SCREENING  Completed     HEPATITIS B VACCINES  Completed     TDAP ADULT ONE TIME DOSE  Completed     COVID-19 Vaccine  Completed       OB History    Para Term  AB Living   2 2 2         SAB TAB Ectopic Multiple Live Births                  # Outcome Date GA Lbr Jaret/2nd Weight Sex Delivery Anes PTL Lv   2 Term            1 Term                Current Outpatient Medications   Medication Sig Dispense Refill     acetaminophen (TYLENOL) 325 MG tablet Take 650 mg by mouth every 6 (six) hours as needed for pain.       cholecalciferol, vitamin D3, (VITAMIN D3) 5,000 unit Tab Take by mouth.        ibuprofen (ADVIL,MOTRIN) 200 MG tablet Take 2 tablets (400 mg total) by mouth every 6 (six) hours as needed for pain. 30 tablet 0     letrozole (FEMARA) 2.5 mg tablet To start after done with RT       No current facility-administered medications for this visit.      Past Medical History:   Diagnosis Date     History of colonic polyps 2021     Invasive ductal carcinoma of breast, left (H) 2021     Patellofemoral Syndrome      Past Surgical History:    Procedure Laterality Date     BREAST BIOPSY Left     benign     BREAST BIOPSY Right     benign     COLONOSCOPY       TN  DELIVERY ONLY N/A      TN MASTECTOMY, PARTIAL Left 2021    Left Lumpectomy; Ray Lymph Node Biopsy;  Surgeon: Desire Kent MD;  Location: [facility];  Service: General     US BIOPSY CORE LYMPH NODE (BREAST) LEFT Left 2021     US BREAST CORE BIOPSY LEFT Left 2021     US BREAST CORE BIOPSY RIGHT Right 2021     US BREAST CORE BIOPSY RIGHT Right 2021     US SENTINEL NODE INJECTION  2021     Patient has no known allergies.  Family History   Problem Relation Age of Onset     Diverticulitis Mother      Lung cancer Father      No Medical Problems Sister      No Medical Problems Brother      No Medical Problems Daughter      No Medical Problems Daughter      No Medical Problems Sister      No Medical Problems Brother      CABG Neg Hx      ICD Neg Hx      Pacemaker Neg Hx      Sudden death Neg Hx      Social History     Socioeconomic History     Marital status:      Spouse name: Not on file     Number of children: 2     Years of education: Not on file     Highest education level: Not on file   Occupational History     Occupation: P[employer] at [employer]     Employer: [employer]   Social Needs     Financial resource strain: Not on file     Food insecurity     Worry: Not on file     Inability: Not on file     Transportation needs     Medical: Not on file     Non-medical: Not on file   Tobacco Use     Smoking status: Never Smoker     Smokeless tobacco: Never Used   Substance and Sexual Activity     Alcohol use: No     Drug use: No     Sexual activity: Not Currently     Partners: Male   Lifestyle     Physical activity     Days per week: Not on file     Minutes per session: Not on file     Stress: Not on file   Relationships     Social connections     Talks on phone: Not on file     Gets together: Not on file      "Attends Faith service: Not on file     Active member of club or organization: Not on file     Attends meetings of clubs or organizations: Not on file     Relationship status: Not on file     Intimate partner violence     Fear of current or ex partner: Not on file     Emotionally abused: Not on file     Physically abused: Not on file     Forced sexual activity: Not on file   Other Topics Concern     Not on file   Social History Narrative    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  She is a  and has 2 teenaged children.  Her  committed suicide in 2016.  She does have brothers and sisters and her mom in the area.        Reviewed above.    Patient lives with her 2 daughters in 2021.  She feels that she is in stable phase of life.    Emmanuel Peterson MD  5/1/2019        She was born in 1972 per her mother.            Review of Systems  12 point ROS positive for what is indicated in HPI, otherwise negative.      Objective:      Physical Exam:  /51 (Patient Site: Left Arm, Patient Position: Sitting, Cuff Size: Adult Regular)   Pulse 92   Ht 5' 3\" (1.6 m)   Wt 146 lb (66.2 kg)   SpO2 98%   BMI 25.86 kg/m      General appearance: Alert, cooperative, no distress, appears stated age  Head: Normocephalic, atraumatic, without obvious abnormality  EARS: Tm's gray dull with structures seen bilaterally  Eyes: Pupils equal round, reactive.  Conjunctiva clear.  Nose: Nares normal, no drainage.  Throat: Lips, mucosa, tongue normal mucosa pink and moist  Neck: Supple, symmetric, trachea midline, no adenopathy.  No thyroid enlargement, tenderness or nodules.    Back: Symmetric  Breast: Post radiation skin changes left breast  Lungs: Clear to auscultation bilaterally, no wheezing or crackles present.  Respirations unlabored  Heart: Regular rate and rhythm, normal S1 and S2, no murmur, rub or gallop.  Abdomen: Soft, nontender, nondistended. No masses or organomegaly.  Extremities: Extremities normal, " atraumatic.  No cyanosis or edema.  Skin: Skin color, texture, turgor normal no rashes or lesions on limited skin exam  Neurologic: CN II through XII intact, normal strength.   Psych: mood neutral and affect appropriate    Results for orders placed or performed during the hospital encounter of 06/23/21   POCT CREATININE & GFR (to be done in CT Dept prior to CTA if not drawn within 30 days of exam)   Result Value Ref Range    iSTAT Creatinine 0.7 0.6 - 1.1 mg/dL    iSTAT GFR MDRD Af Amer >60 >60 mL/min/1.73m2    iSTAT GFR MDRD Non Af Amer >60 >60 mL/min/1.73m2   CTA Coronary W Calcium Score   Result Value Ref Range    HR 83 bpm    BSA 0.00 m2    Agatston Score of Left Main 0     Agatston Score of the Left Anterior Descending 0     Agatston Score of Circumflex 0     Agatston Score of Right Coronary Artery 0     Total Score 0.00        Mahnaz Pacheco, DO

## 2021-08-06 ENCOUNTER — OFFICE VISIT (OUTPATIENT)
Dept: RADIATION ONCOLOGY | Facility: HOSPITAL | Age: 36
End: 2021-08-06
Attending: RADIOLOGY
Payer: COMMERCIAL

## 2021-08-06 VITALS
DIASTOLIC BLOOD PRESSURE: 57 MMHG | WEIGHT: 146 LBS | OXYGEN SATURATION: 99 % | BODY MASS INDEX: 25.86 KG/M2 | HEART RATE: 82 BPM | RESPIRATION RATE: 18 BRPM | SYSTOLIC BLOOD PRESSURE: 105 MMHG | TEMPERATURE: 97.7 F

## 2021-08-06 DIAGNOSIS — C50.912 INVASIVE DUCTAL CARCINOMA OF BREAST, LEFT (H): ICD-10-CM

## 2021-08-06 DIAGNOSIS — C50.919 BREAST CA (H): Primary | ICD-10-CM

## 2021-08-06 PROCEDURE — G0463 HOSPITAL OUTPT CLINIC VISIT: HCPCS

## 2021-08-06 NOTE — LETTER
8/6/2021         RE: Xiao Beal  [address]  [place] [address]        Dear Colleague,    Thank you for referring your patient, Xiao Beal, to the [facility] RADIATION ONCOLOGY [place]. Please see a copy of my visit note below.    [facility] Radiation Oncology Follow Up     Patient: Xiao Beal  MRN: [id number]  Date of Service: 08/06/2021       DISEASE TREATED:  Left breast cancer, pathologic stage T2 N1 aM0, ER/[place] positive, HER-2 negative, Oncotype DX score 17, status post lumpectomy and sentinel lymph node/limited axillary lymph node resection with negative margin.  2/6 removed axillary lymph nodes showed evidence of metastatic disease with largest metastatic focus measured 9 x 5 mm with extensive ERIC up to 6 mm in distance.       TYPE OF RADIATION THERAPY ADMINISTERED:  5040 cGy in 28 treatments targeted to the left breast/chest wall and regional lymph nodes followed by additional 1000 Gy in 5 fractions given to the primary tumor bed using electron.  Her radiation therapy was given from 4/12/2021-6/2/2021.     INTERVAL SINCE COMPLETION OF RADIATION THERAPY: 2 months.      SUBJECTIVE:  Ms. Beal is a 36 year old female who has been in her usual state of health until recently. She noticed an indentation or dimpling of her breast several months ago.  She brought it up to her physician when she was seen because of neck and arm pain after a Covid vaccine.  Patient underwent mammogram followed by ultrasound study on 1/20/2021 which showed 11 x 13 mm abnormalities at 1 o'clock position 9 cm from nipple of the left breast highly suspicious for pregnancy.  This is a correlate with the patient's palpable abnormality.  There are 2 additional abnormal finding in the right breast most likely from benign process.  Patient underwent needle biopsy on 1/21/2021.  The pathology from right breast 6:00 and 8 o'clock position showed no evidence of carcinoma.  The left breast biopsy indeed showed  invasive ductal carcinoma, ER/IA positive, HER-2 negative.  The left axillary lymph node mass biopsy is negative for metastatic carcinoma.  After discussed with the patient about various treatment options, the patient elected proceed with breast preservation protocol as her treatment choice and underwent left lumpectomy and sentinel node resection followed by limited left axillary lymph node dissection by Dr. Kent, breast surgeon on 2/19/2021.  The final pathology reviewed 28 x 22 x 15 mm invasive ductal carcinoma, [place] grade 2 with associated DCIS.  Margins was negative with closest margin measured 3 mm for invasive at inferior margin and 5 mm for DCIS at posterior margin.  2/6 removed axillary lymph nodes showed evidence of metastatic disease with largest metastatic focus measured 9 x 5 mm with extensive ERIC and measures up to 6 mm in greatest dimension.  Postoperatively she has been recovering well.  Her Oncotype DX score is 17.  The patient received postop radiation therapy for her breast cancer with a total dose of 5040 cGy in 28 treatments targeted to the left breast/chest wall and regional lymph nodes followed by additional 1000 Gy in 5 fractions given to the primary tumor bed using electron.  Her radiation therapy was given from 4/12/2021-6/2/2021.  She tolerated ration therapy reasonably well with expected acute side effect.      The patient has been doing well since completion of radiation therapy.  She denies any significant pain and discomfort related to the therapy.  She is here for routine postradiation therapy office follow-up.    Medications were reviewed and are up to date on EPIC.    The following portions of the patient's history were reviewed and updated as appropriate: allergies, current medications, past family history, past medical history, past social history, past surgical history and problem list.    Review of Systems:      General  Constitutional  Constitutional (WDL): All  constitutional elements are within defined limits  EENT  Eye Disorders  Eye Disorder (WDL): All eye disorder elements are within defined limits  Ear Disorders  Ear Disorder (WDL): All ear disorder elements are within defined limits (hearing loss right ear)  Respiratory  Respiratory  Respiratory (WDL): All respiratory elements are within defined limits  Cardiovascular  Cardiovascular  Cardiovascular (WDL): All cardiovascular elements are within defined limits  Gastrointestinal  Gastrointestinal  Gastrointestinal (WDL): Exceptions to WDL  Anorexia: Absent or within normal limits  Nausea: Absent or within normal limits  Vomiting: Absent or within normal limits  Dehydration: Absent or within normal limits  Dysgeusia: Absent or within normal limits  Dysphagia: Absent or within normal limits  Mucositis Oral: Absent or within normal limits  Esophagitis: Asymptomatic OR clinical or diagnostic observations only OR intervention not indicated  Constipation: Occasional or intermittent symptoms OR occasional use of stool softeners, laxatives, dietary modification, or enema  Diarrhea: Absent or within normal limits  Pharyngitis: Absent or within normal limits  Dry Mouth: Absent or within normal limits  Musculoskeletal  Musculoskeletal and Connective Tissue Disorders  Musculoskeletal & Connective (WDL): All musculoskeletal & connective elements are within defined limits (cramping)  Integumentary  Integumentary  Integumentary (WDL): All integumentary elements are within defined limits  Neurological  Neurosensory  Neurosensory (WDL): All neurosensory elements are within defined limits  Genitourinary/Reproductive  Genitourinary  Genitourinary (WDL): All genitourinary elements are within defined limits  Lymphatic  Lymph System Disorders  Lymph (WDL): All lymph elements are within defined limits  Pain  Pain Score: No Pain (0)  AUA Assessment                                                              Accompanied by  Accompanied By:  self only    Objective:     PHYSICAL EXAMINATION:    /57   Pulse 82   Temp 97.7  F (36.5  C)   Resp 18   Wt 66.2 kg (146 lb)   SpO2 99%   BMI 25.86 kg/m      Gen: Alert, in NAD  Eyes: PERRL, EOMI, sclera anicteric  HENT     Head: NC/AT     Ears: No external auricular lesions     Nose/sinus: No rhinorrhea or epistaxis     Oropharynx: MMM, no visible oral lesions  Neck: Supple, full ROM, no LAD  Chest: The breasts and nipples are symmetrical bilaterally.  There is no evidence of nipple discharge.  There is no palpable lump or mass bilaterally in the breast, axillary, and supraclavicular region.  There is well-healed surgical scar in the left breast consistent with a recent history of surgery.  Skin medial radiation therapy field shows post therapy changes  Pulm: No wheezing, stridor or respiratory distress  CV: Well-perfused, no cyanosis, no pedal edema  Abdominal: BS+, soft, nontender, nondistended, no hepatomegaly  Back: No step-offs or pain to palpation along the thoracolumbar spine  Rectal: Deferred  : Deferred  Musculoskeletal: Normal muscle bulk and tone  Skin: Normal color and turgor  Neurologic: A/Ox3, CN II-XII intact, normal gait and station  Psychiatric: Appropriate mood and affect      Impression     The patient is a 36-year-old female with a diagnosis of left breast cancer, status post surgery followed by postop radiation therapy.  She completed her radiation therapy 2 months ago and has been recovering well.    Assessment & Plan:     1.  Continue for long-term follow-up and ongoing care for her breast cancer with Dr. Echo Kenny, medical oncology at [facility] as planned.    2.  Follow-up with radiation oncology as needed.      Face to face time  15 minutes with > 80% spent on consultation, education and coordination of care.    Iza Espinoza MD, PhD  Department of Radiation Oncology   [facility]  Tel: [phone]  Page: [phone]    [facility]  [address] [place] [address]     [facility]   [address] [place] [address]    CC:  Patient Care Team:  Emmanuel Peterson MD as PCP - General (Family Medicine)  Cristy Kenny MD as MD (Hematology & Oncology)  Desire Kent MD as Referring Physician (Surgery)  Chastity Gaona, RN as Specialty Care Coordinator (Breast Oncology)  Iza Espinoza MD as MD (Hematology & Oncology)  Iza Espinoza MD as Assigned Cancer Care Provider  Vargas Celestin MD as Assigned Heart and Vascular Provider  Emmanuel Peterson MD as Assigned PCP      Flory is here for concerns about her skin tone in the radiation area. She is also having some side effects from her AI and is going to call her MO. She does have some darker skin tone in the radiation area, front and back but assured that it will resolve with time. She reports fatigue but is working a lot to support her family. She c/o stiffness/tightness on her left chest wall. Dr. Espinoza encouraged her to do her stretching exercises and not let it get tight. Molly, RN, OCN, CBCN      Again, thank you for allowing me to participate in the care of your patient.        Sincerely,        Iza Espinoza MD

## 2021-08-06 NOTE — PROGRESS NOTES
Flory is here for concerns about her skin tone in the radiation area. She is also having some side effects from her AI and is going to call her MO. She does have some darker skin tone in the radiation area, front and back but assured that it will resolve with time. She reports fatigue but is working a lot to support her family. She c/o stiffness/tightness on her left chest wall. Dr. Espinoza encouraged her to do her stretching exercises and not let it get tight. Molly RN, OCN, CBCN

## 2021-08-06 NOTE — PROGRESS NOTES
[facility] Radiation Oncology Follow Up     Patient: Xiao Beal  MRN: [id number]  Date of Service: 08/06/2021       DISEASE TREATED:  Left breast cancer, pathologic stage T2 N1 aM0, ER/IL positive, HER-2 negative, Oncotype DX score 17, status post lumpectomy and sentinel lymph node/limited axillary lymph node resection with negative margin.  2/6 removed axillary lymph nodes showed evidence of metastatic disease with largest metastatic focus measured 9 x 5 mm with extensive ERIC up to 6 mm in distance.       TYPE OF RADIATION THERAPY ADMINISTERED:  5040 cGy in 28 treatments targeted to the left breast/chest wall and regional lymph nodes followed by additional 1000 Gy in 5 fractions given to the primary tumor bed using electron.  Her radiation therapy was given from 4/12/2021-6/2/2021.     INTERVAL SINCE COMPLETION OF RADIATION THERAPY: 2 months.      SUBJECTIVE:  Ms. Beal is a 36 year old female who has been in her usual state of health until recently. She noticed an indentation or dimpling of her breast several months ago.  She brought it up to her physician when she was seen because of neck and arm pain after a Covid vaccine.  Patient underwent mammogram followed by ultrasound study on 1/20/2021 which showed 11 x 13 mm abnormalities at 1 o'clock position 9 cm from nipple of the left breast highly suspicious for pregnancy.  This is a correlate with the patient's palpable abnormality.  There are 2 additional abnormal finding in the right breast most likely from benign process.  Patient underwent needle biopsy on 1/21/2021.  The pathology from right breast 6:00 and 8 o'clock position showed no evidence of carcinoma.  The left breast biopsy indeed showed invasive ductal carcinoma, ER/IL positive, HER-2 negative.  The left axillary lymph node mass biopsy is negative for metastatic carcinoma.  After discussed with the patient about various treatment options, the patient elected proceed with breast preservation  protocol as her treatment choice and underwent left lumpectomy and sentinel node resection followed by limited left axillary lymph node dissection by Dr. Kent, breast surgeon on 2/19/2021.  The final pathology reviewed 28 x 22 x 15 mm invasive ductal carcinoma, [place] grade 2 with associated DCIS.  Margins was negative with closest margin measured 3 mm for invasive at inferior margin and 5 mm for DCIS at posterior margin.  2/6 removed axillary lymph nodes showed evidence of metastatic disease with largest metastatic focus measured 9 x 5 mm with extensive ERIC and measures up to 6 mm in greatest dimension.  Postoperatively she has been recovering well.  Her Oncotype DX score is 17.  The patient received postop radiation therapy for her breast cancer with a total dose of 5040 cGy in 28 treatments targeted to the left breast/chest wall and regional lymph nodes followed by additional 1000 Gy in 5 fractions given to the primary tumor bed using electron.  Her radiation therapy was given from 4/12/2021-6/2/2021.  She tolerated ration therapy reasonably well with expected acute side effect.      The patient has been doing well since completion of radiation therapy.  She denies any significant pain and discomfort related to the therapy.  She is here for routine postradiation therapy office follow-up.    Medications were reviewed and are up to date on EPIC.    The following portions of the patient's history were reviewed and updated as appropriate: allergies, current medications, past family history, past medical history, past social history, past surgical history and problem list.    Review of Systems:      General  Constitutional  Constitutional (WDL): All constitutional elements are within defined limits  EENT  Eye Disorders  Eye Disorder (WDL): All eye disorder elements are within defined limits  Ear Disorders  Ear Disorder (WDL): All ear disorder elements are within defined limits (hearing loss right  ear)  Respiratory  Respiratory  Respiratory (WDL): All respiratory elements are within defined limits  Cardiovascular  Cardiovascular  Cardiovascular (WDL): All cardiovascular elements are within defined limits  Gastrointestinal  Gastrointestinal  Gastrointestinal (WDL): Exceptions to WDL  Anorexia: Absent or within normal limits  Nausea: Absent or within normal limits  Vomiting: Absent or within normal limits  Dehydration: Absent or within normal limits  Dysgeusia: Absent or within normal limits  Dysphagia: Absent or within normal limits  Mucositis Oral: Absent or within normal limits  Esophagitis: Asymptomatic OR clinical or diagnostic observations only OR intervention not indicated  Constipation: Occasional or intermittent symptoms OR occasional use of stool softeners, laxatives, dietary modification, or enema  Diarrhea: Absent or within normal limits  Pharyngitis: Absent or within normal limits  Dry Mouth: Absent or within normal limits  Musculoskeletal  Musculoskeletal and Connective Tissue Disorders  Musculoskeletal & Connective (WDL): All musculoskeletal & connective elements are within defined limits (cramping)  Integumentary  Integumentary  Integumentary (WDL): All integumentary elements are within defined limits  Neurological  Neurosensory  Neurosensory (WDL): All neurosensory elements are within defined limits  Genitourinary/Reproductive  Genitourinary  Genitourinary (WDL): All genitourinary elements are within defined limits  Lymphatic  Lymph System Disorders  Lymph (WDL): All lymph elements are within defined limits  Pain  Pain Score: No Pain (0)  AUA Assessment                                                              Accompanied by  Accompanied By: self only    Objective:     PHYSICAL EXAMINATION:    /57   Pulse 82   Temp 97.7  F (36.5  C)   Resp 18   Wt 66.2 kg (146 lb)   SpO2 99%   BMI 25.86 kg/m      Gen: Alert, in NAD  Eyes: PERRL, EOMI, sclera anicteric  HENT     Head: NC/AT      Ears: No external auricular lesions     Nose/sinus: No rhinorrhea or epistaxis     Oropharynx: MMM, no visible oral lesions  Neck: Supple, full ROM, no LAD  Chest: The breasts and nipples are symmetrical bilaterally.  There is no evidence of nipple discharge.  There is no palpable lump or mass bilaterally in the breast, axillary, and supraclavicular region.  There is well-healed surgical scar in the left breast consistent with a recent history of surgery.  Skin medial radiation therapy field shows post therapy changes  Pulm: No wheezing, stridor or respiratory distress  CV: Well-perfused, no cyanosis, no pedal edema  Abdominal: BS+, soft, nontender, nondistended, no hepatomegaly  Back: No step-offs or pain to palpation along the thoracolumbar spine  Rectal: Deferred  : Deferred  Musculoskeletal: Normal muscle bulk and tone  Skin: Normal color and turgor  Neurologic: A/Ox3, CN II-XII intact, normal gait and station  Psychiatric: Appropriate mood and affect      Impression     The patient is a 36-year-old female with a diagnosis of left breast cancer, status post surgery followed by postop radiation therapy.  She completed her radiation therapy 2 months ago and has been recovering well.    Assessment & Plan:     1.  Continue for long-term follow-up and ongoing care for her breast cancer with Dr. Echo Kenny, medical oncology at [facility] as planned.    2.  Follow-up with radiation oncology as needed.      Face to face time  15 minutes with > 80% spent on consultation, education and coordination of care.    Iza Espinoza MD, PhD  Department of Radiation Oncology   [facility]  Tel: [phone]  Page: [phone]    [facility]  [address] [place] [address]     [address] [place] [address]    CC:  Patient Care Team:  Emmanuel Peterson MD as PCP - General (Family Medicine)  Cristy Kenny MD as MD (Hematology & Oncology)  Desire Kent MD as  Referring Physician (Surgery)  Chastity Gaona, RN as Specialty Care Coordinator (Breast Oncology)  Iza Espinoza MD as MD (Hematology & Oncology)  Iza Espinoza MD as Assigned Cancer Care Provider  Vargas Celestin MD as Assigned Heart and Vascular Provider  Emmanuel Peterson MD as Assigned PCP

## 2021-09-19 ENCOUNTER — HEALTH MAINTENANCE LETTER (OUTPATIENT)
Age: 36
End: 2021-09-19

## 2021-09-20 DIAGNOSIS — C50.912 INVASIVE DUCTAL CARCINOMA OF BREAST, LEFT (H): Primary | ICD-10-CM

## 2021-10-08 ENCOUNTER — TELEPHONE (OUTPATIENT)
Dept: ONCOLOGY | Facility: CLINIC | Age: 36
End: 2021-10-08

## 2021-10-08 ENCOUNTER — ONCOLOGY VISIT (OUTPATIENT)
Dept: ONCOLOGY | Facility: CLINIC | Age: 36
End: 2021-10-08
Attending: INTERNAL MEDICINE
Payer: COMMERCIAL

## 2021-10-08 VITALS
SYSTOLIC BLOOD PRESSURE: 121 MMHG | TEMPERATURE: 98.6 F | DIASTOLIC BLOOD PRESSURE: 74 MMHG | WEIGHT: 144 LBS | BODY MASS INDEX: 25.51 KG/M2 | OXYGEN SATURATION: 99 % | HEART RATE: 90 BPM

## 2021-10-08 DIAGNOSIS — Z79.811 USE OF AROMATASE INHIBITORS: ICD-10-CM

## 2021-10-08 DIAGNOSIS — Z17.0 MALIGNANT NEOPLASM OF OVERLAPPING SITES OF LEFT BREAST IN FEMALE, ESTROGEN RECEPTOR POSITIVE (H): Primary | ICD-10-CM

## 2021-10-08 DIAGNOSIS — N95.1 MENOPAUSAL SYNDROME (HOT FLASHES): ICD-10-CM

## 2021-10-08 DIAGNOSIS — C50.812 MALIGNANT NEOPLASM OF OVERLAPPING SITES OF LEFT BREAST IN FEMALE, ESTROGEN RECEPTOR POSITIVE (H): Primary | ICD-10-CM

## 2021-10-08 DIAGNOSIS — M25.50 ARTHRALGIA, UNSPECIFIED JOINT: ICD-10-CM

## 2021-10-08 PROCEDURE — G0463 HOSPITAL OUTPT CLINIC VISIT: HCPCS

## 2021-10-08 PROCEDURE — 99215 OFFICE O/P EST HI 40 MIN: CPT | Performed by: PHYSICIAN ASSISTANT

## 2021-10-08 RX ORDER — DULOXETIN HYDROCHLORIDE 30 MG/1
30 CAPSULE, DELAYED RELEASE ORAL DAILY
Qty: 30 CAPSULE | Refills: 1 | Status: SHIPPED | OUTPATIENT
Start: 2021-10-08 | End: 2022-06-27

## 2021-10-08 RX ORDER — CALCIUM CARBONATE/VITAMIN D3 500-10/5ML
LIQUID (ML) ORAL
COMMUNITY
End: 2022-06-27

## 2021-10-08 ASSESSMENT — PAIN SCALES - GENERAL: PAINLEVEL: MODERATE PAIN (5)

## 2021-10-08 NOTE — NURSING NOTE
"Oncology Rooming Note    October 8, 2021 2:53 PM   Xiao Beal is a 36 year old female who presents for:    Chief Complaint   Patient presents with     Oncology Clinic Visit     Breast cancer     Initial Vitals: /74 (BP Location: Right arm, Patient Position: Sitting, Cuff Size: Adult Regular)   Pulse 90   Temp 98.6  F (37  C) (Oral)   Wt 65.3 kg (144 lb)   SpO2 99%   BMI 25.51 kg/m   Estimated body mass index is 25.51 kg/m  as calculated from the following:    Height as of 6/24/21: 1.6 m (5' 3\").    Weight as of this encounter: 65.3 kg (144 lb). Body surface area is 1.7 meters squared.  Moderate Pain (5) Comment: Data Unavailable   No LMP recorded. Patient is perimenopausal.  Allergies reviewed: Yes  Medications reviewed: Yes    Medications: Medication refills not needed today.  Pharmacy name entered into [employer]:    [facility] [place], [place] - [address] [id number] - [place], [place] - [address] [facility]  [address] [facility] [address]  [id number] - [place], [place] - [address] AT [facility] [address]    Clinical concerns: New concerns: swollen left arm with left shoulder pain, decreased range of motion last two weeks.        Wilma Jackson LPN October 8, 2021 2:53 PM                "

## 2021-10-08 NOTE — PROGRESS NOTES
CANCER SURVIVORSHIP VISIT - END OF TREATMENT VISIT  Oct 8, 2021    REASON FOR VISIT: survivorship visit for history of breast cancer    CANCER HISTORY AND TREATMENT:  This is a 36 y.o. Female (older than her stated age likely in her 50s based on immigration and change of age), with T2N1a left breast cancer, ER positive. This was picked up by the patient. She noticed an indentation or dimpling of her breast several months ago. She brought it up to her physician when she was seen because of neck and arm pain after a Covid vaccine. She underwent a mammogram and ultrasound. A needle biopsy was done which shows an invasive ductal carcinoma. It is estrogen receptor positive 95%, progesterone receptor positive weakly 3% and HER-2 negative 0 by IHC.  R breast revealed two fibroadenomas.  L breast - 11x13 mm mass in the 1 oclock position 9 cm from the nipple.  Lymph nodes appeared normal.        She met with Dr Kent.  She underwent a lumpectomy with SNLB.       Oncotype Dx score returned at 17 giving her a risk of metastatic disease of 15% at 10 years.  No benefit from chemotherapy. I recommended adjuvant radiation and adjuvant endocrine therapy. Lab work confirmed postmenopausal (she told me her age is not accurate in the computer). She began letrozole in July 2021.     INTERVAL HISTORY:   Patient has been experiencing joint pain and hot flashes which began about one month after starting letrozole.  The joint pain often keeps her up at night.  She often only gets 3 to 4 hours of sleep at night.  She is attempting to exercise routinely by walking.  She applies essential oils to her joints at nighttime although this does not always provide relief.  She has not used over-the-counter medication like Tylenol or other topical pain medications.    She recently began incorporating weightlifting into her exercise routine.  She has been using light free weights.  Over the past few weeks she has developed swelling in her left arm  consistent with lymphedema.  She was scheduled to see lymphedema therapy today but her appointment was canceled.  She will now see them for the first time on Monday.    She voices a desire to continue her aromatase inhibitor but is concerned about her side effects.  She has had some increased stress related to working overtime.       Otherwise, ROS negative for: fevers, headaches, visual changes, new sites of pain, shortness of breath, cough, chest pain, abdominal pain, nausea, vomiting, abnormal vaginal bleeding, or leg swelling.      Current Outpatient Medications   Medication Sig Dispense Refill     calcium carbonate 600 mg-vitamin D 400 units (CALTRATE) 600-400 MG-UNIT per tablet Take 1 tablet by mouth daily       DULoxetine (CYMBALTA) 30 MG capsule Take 1 capsule (30 mg) by mouth daily 30 capsule 1     letrozole (FEMARA) 2.5 MG tablet Take 1 tablet (2.5 mg) by mouth daily 90 tablet 3     magnesium oxide 400 MG CAPS        NO ACTIVE MEDICATIONS        vitamin D3 (CHOLECALCIFEROL) 125 MCG (5000 UT) tablet           No Known Allergies    PHYSICAL EXAMINATION  /74 (BP Location: Right arm, Patient Position: Sitting, Cuff Size: Adult Regular)   Pulse 90   Temp 98.6  F (37  C) (Oral)   Wt 65.3 kg (144 lb)   SpO2 99%   BMI 25.51 kg/m    Constitutional: Alert, oriented female in no visible distress.  Eyes: PERRL. Anicteric sclerae.  ENT/Mouth: OM moist and pink without lesions or thrush.  CV: RRR, no murmurs appreciated.  Resp: CTAB throughout  Abdomen: Soft, non-tender, non-distended. Bowel sounds present.   Extremities: No lower extremity edema appreciated.  Skin: Warm, dry. No bruising or petechiae noted.  Lymph: No cervical, supraclavicular, or axillary lymphadenopathy appreciated. Left arm lymphedema present.  Neuro: CN II-XII grossly intact.  Breast: Breast symmetric without erythema, dimpling, discharge or palpable nodules. Healed left breast lumpectomy incision.    LABS:  No results found for this or  any previous visit (from the past 24 hour(s)).      IMPRESSION/PLAN:  Xiao Beal is a 36 year old female with a history of left breast cancer s/p lumpectomy and SNLB and radiation, here for cancer survivorship visit following completion of cancer treatment.    Cancer history. She will continue to follow-up with her oncology team as scheduled, with plan for follow-up every 3 months for the first 2-3 years, every 6 months for years 4-5, and annually thereafter.     Genetic testing. She completed genetic testing.    Cardiotoxicity. Given her exposure to Adriamycin and/or radiation including the left chest, she may be at risk for cardiomyopathy, arrhythmias, and left ventricular dysfunction. A post-treatment echo has not been performed.  She should continue to follow-up routinely with her PCP for lipid testing, and monitoring of blood pressure and blood glucose, with treatment as indicated.    Risk of developing osteoporosis. She is post-menopausal. She should continue to have DEXA scans every 2 years, and was recommended to perform weightbearing exercises 2-3 days per week, and to supplement with 1200 mg of calcium, 1000 international unites of vitamin D daily.     Risk of hematologic malignancy. Rare risk of developing secondary hematologic malignancy. Annual CBC recommended.    Radiation side effects. Radiation would have included the lungs, bone, skin, left breast and axilla. She should seek medical attention if she notices any new skin lesions in the area of radiation. The bones are at risk for fractures, so she should inform a provider with any new pains in these areas. The lungs are at risk for fibrosis, restrictive and/or obstructive lung disease. Currently, she is asymptomatic. If she should develop any shortness of breath, hemoptysis, cough, would consider further evaluation with CT or X-ray. No routine screening for potential lung complications unless symptomatic.    Coping: She is coping well with her  diagnosis but experiences increased stress related to the side effects she is experiencing while taking an aromatase inhibitor.    Cognitive changes.   She did not develop any any cognitive changes. We would not expect this as a late side effect.    Fatigue.   - CBC, TSH if continuing to have fatigue  - Recommend regular exercise    Sexuality concerns. She has had issues with vaginal dryness. Recommended Replens. If not effective, other alternatives include [facility], [facility], or venturing to [facility].    Risk of lymphedema: Can occur at any time. She will contact the clinic if she notices any swelling in her arm, and will be given a referral to the lymphedema specialists.    Cancer screening. She should undergo routine screening for women in her age group. She will have a Pap and pelvic exam as directed by her primary care provider (or annually if on Tamoxifen).     Healthy lifestyle. She should maintain a healthy weight with a BMI between 20-25. She should exercise at least 150 minutes weekly at moderate intensity. She should see the eye doctor every 1-2 years, and dentist every 6 months for cleanings. She should not use any tobacco. She should minimize alcohol intake. If continuing to drink, should follow CDC recommendations for no more than 1 alcoholic drink/day for women.    Lymphedema. She is experiencing new left arm lymphedema. Schedule to see lymphedema therapy 10/11/21. She was advised to perform gentle arm, axillary and chest massage in the meantime. Advised that she avoid using weights until see by lymphedema therapy.    AI side effects. Discussed symptom management techniques including use of PRN acetaminophen, routine exercise,adding daily cymbalta or topical medications applied to joints. Joint pain is preventing her from getting adequate sleep. I encouraged a trial of cymbalta. She was agreeable to have this sent to her pharmacy and will consider starting.  -Begin Cymbalta 30 mg daily    Plan:  Mammogram and follow-up with Dr. Kenny in 3 months.    Lamar Quiroga CNP  [facility] [address] [address] [phone]    60 minutes spent on the date of the encounter doing chart review, patient visit and documentation        I saw patient and performed the ROS and exam myself.  The assessment and plan were mutually discussed and this note was edited to reflect my findings.  Carmen Lenz PA-C

## 2021-10-08 NOTE — TELEPHONE ENCOUNTER
FMLA forms received via pt walk in.      Forms to be completed and put in folder for provider to approve.    Fax #:  [phone]    Intermittent time off for Lymphedema/PT for 6 months.    Tiana Rodriguez CMA ([facility])

## 2021-10-11 ENCOUNTER — HOSPITAL ENCOUNTER (OUTPATIENT)
Dept: PHYSICAL THERAPY | Facility: REHABILITATION | Age: 36
End: 2021-10-11
Payer: COMMERCIAL

## 2021-10-11 DIAGNOSIS — I89.0 SECONDARY LYMPHEDEMA: Primary | ICD-10-CM

## 2021-10-11 PROCEDURE — 97110 THERAPEUTIC EXERCISES: CPT | Mod: GP | Performed by: PHYSICAL THERAPIST

## 2021-10-11 PROCEDURE — 97535 SELF CARE MNGMENT TRAINING: CPT | Mod: GP | Performed by: PHYSICAL THERAPIST

## 2021-10-11 PROCEDURE — 97161 PT EVAL LOW COMPLEX 20 MIN: CPT | Mod: GP | Performed by: PHYSICAL THERAPIST

## 2021-10-11 NOTE — TELEPHONE ENCOUNTER
FMLA forms filled out and put in providers folder for review and signature.      Tiana Rodriguez CMA ([facility])

## 2021-10-11 NOTE — PROGRESS NOTES
10/11/21 1500   Rehab Discipline   Discipline PT   Type of Visit   Type of visit Initial Edema Evaluation       present No   General Information   Start of care 10/11/21   Referring physician Iza Espinoza   Orders Evaluate and treat as indicated   Order date 09/20/21   Medical diagnosis Invasive ductal carcinoma of breast, left       Onset of illness / date of surgery 02/21/21   Edema onset 09/15/21   Affected body parts LUE   Edema etiology Cancer with lymph node dissection;Radiation   Location - Cancer with lymph node dissection L breast/axilla   Location - Radiation L breast/axilla   Fall Risk Screen   Fall screen completed by PT   Have you fallen 2 or more times in the past year? No   Have you fallen and had an injury in the past year? No   Is patient a fall risk? No   Abuse Screen (yes response referral indicated)   Feels Unsafe at Home or Work/School no   Feels Threatened by Someone no   Does Anyone Try to Keep You From Having Contact with Others or Doing Things Outside Your Home? no   Physical Signs of Abuse Present no   System Outcome Measures   Outcome Measures Lymphedema  (LLIS)   Subjective Report   Patient report of symptoms Pt is s/p left breast cancer, pathologic stage T2 N1 aM0, ER/NY positive, HER-2 negative, status post lumpectomy and sentinel lymph node/limited axillary lymph node resection with negative margin (surgery was on 2/21/21).  2/6 removed axillary lymph nodes showed evidence of metastatic disease with largest metastatic focus measured 9 x 5 mm with extensive ERIC up to 6 mm in distance. Radiation to left breast/chest wall and axilla completed June 2021. No chemotherapy needed. Pt reports noticing pain in the L elbow - feels like pulling when stretching the elbow. When comparing the R and L side she thinks there is a difference in size. Her watch leaves a geoff on her L wrist d/t mild swelling. She was trying to work out with 5 lb weights and after doing this for a few  days, she started to have more issues with the L arm. Pt is R hand dominant. Numbness in the L axilla. Mild swelling into the L breast and lateral trunk which is improving over time.    Patient / Family Goals   Patient / family goals statement To take care of swelling    Pain   Patient currently in pain Yes   Pain location L shoulder   Pain rating 5-6/10  (with movement of L shoulder)   Edema Exam / Assessment   Skin condition Pitting;Axillary web cording   Skin condition comments Cording palpable in axilla, elbow and into forearm   Pitting 3+   Pitting location L forearm   Stemmer sign Positive   Stemmer sign comments L hand   Girth Measurements   Girth Measurements Refer to separate girth measurement flowsheet   Range of Motion   ROM Other   ROM comments L shoulder flx and abd limited by 10-15% d/t cording   Planned Edema Interventions   Planned edema interventions Manual lymph drainage;Gradient compression bandaging;Fit for compression garment;Exercises;Manual therapy;Education;Soft tissue mobilization;Scar mobilization;Home management program development   Clinical Impression   Criteria for skilled therapeutic intervention met Yes   Therapy diagnosis Secondary lymphedema   Influenced by the following impairments / conditions Stage 1;Axillary Web Syndrome   Functional limitations due to impairments / conditions ROM limitations, pain   Clinical Presentation Stable/Uncomplicated   Clinical Decision Making (Complexity) Low complexity   Treatment Frequency 2x/week   Treatment duration 10 visits   Patient / family and/or staff in agreement with plan of care Yes   Risks and benefits of therapy have been explained Yes   Clinical impression comments Pt is a 36 year-old woman with chief complaint left arm swelling s/p left breast cancer/lymph node removal.  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 She reports  pulling into her arm, pain in the forearm and difficulty with overhead reaching. She has noted swelling in the forearm in recent days. There is shallow, 3+ pitting edema in the forearm. She hasn't used compression yet or done home management. She is appropriate for decongestive therapy and will need a compression sleeve.    Total Evaluation Time   PT Eval, Low Complexity Minutes (47607) 20     Gabo Solitario, PT, CLT  10/11/2021

## 2021-10-13 ENCOUNTER — HOSPITAL ENCOUNTER (OUTPATIENT)
Dept: PHYSICAL THERAPY | Facility: REHABILITATION | Age: 36
End: 2021-10-13
Payer: COMMERCIAL

## 2021-10-13 DIAGNOSIS — C50.912 INVASIVE DUCTAL CARCINOMA OF BREAST, LEFT (H): ICD-10-CM

## 2021-10-13 DIAGNOSIS — I89.0 SECONDARY LYMPHEDEMA: Primary | ICD-10-CM

## 2021-10-13 PROCEDURE — 97140 MANUAL THERAPY 1/> REGIONS: CPT | Mod: GP | Performed by: PHYSICAL THERAPIST

## 2021-10-13 PROCEDURE — 97535 SELF CARE MNGMENT TRAINING: CPT | Mod: GP | Performed by: PHYSICAL THERAPIST

## 2021-10-13 NOTE — ADDENDUM NOTE
Encounter addended by: Gabo Solitario, PT on: 10/13/2021 5:07 PM   Actions taken: Order list changed, Diagnosis association updated

## 2021-10-14 DIAGNOSIS — C50.912 INVASIVE DUCTAL CARCINOMA OF BREAST, LEFT (H): Primary | ICD-10-CM

## 2021-10-16 NOTE — TELEPHONE ENCOUNTER
LA paperwork completed, checked for accuracy, signed and faxed to [facility] @ [phone]. A copy was made, sent to scanning and original mailed to patient at home address.    Successful transmission verified in Right Fax.    Tiana Rodriguez CMA

## 2021-10-18 ENCOUNTER — HOSPITAL ENCOUNTER (OUTPATIENT)
Dept: PHYSICAL THERAPY | Facility: REHABILITATION | Age: 36
End: 2021-10-18
Payer: COMMERCIAL

## 2021-10-18 DIAGNOSIS — I89.0 SECONDARY LYMPHEDEMA: Primary | ICD-10-CM

## 2021-10-18 PROCEDURE — 97140 MANUAL THERAPY 1/> REGIONS: CPT | Mod: GP | Performed by: PHYSICAL THERAPIST

## 2021-10-20 ENCOUNTER — HOSPITAL ENCOUNTER (OUTPATIENT)
Dept: PHYSICAL THERAPY | Facility: REHABILITATION | Age: 36
End: 2021-10-20
Payer: COMMERCIAL

## 2021-10-20 DIAGNOSIS — I89.0 SECONDARY LYMPHEDEMA: Primary | ICD-10-CM

## 2021-10-20 PROCEDURE — 97140 MANUAL THERAPY 1/> REGIONS: CPT | Mod: GP | Performed by: PHYSICAL THERAPIST

## 2021-10-20 PROCEDURE — 97110 THERAPEUTIC EXERCISES: CPT | Mod: GP | Performed by: PHYSICAL THERAPIST

## 2021-10-22 ENCOUNTER — HOSPITAL ENCOUNTER (OUTPATIENT)
Dept: PHYSICAL THERAPY | Facility: REHABILITATION | Age: 36
End: 2021-10-22
Payer: COMMERCIAL

## 2021-10-22 DIAGNOSIS — I89.0 SECONDARY LYMPHEDEMA: Primary | ICD-10-CM

## 2021-10-22 PROCEDURE — 97140 MANUAL THERAPY 1/> REGIONS: CPT | Mod: GP | Performed by: PHYSICAL THERAPIST

## 2021-10-29 ENCOUNTER — OFFICE VISIT (OUTPATIENT)
Dept: FAMILY MEDICINE | Facility: CLINIC | Age: 36
End: 2021-10-29
Payer: COMMERCIAL

## 2021-10-29 ENCOUNTER — HOSPITAL ENCOUNTER (OUTPATIENT)
Dept: PHYSICAL THERAPY | Facility: REHABILITATION | Age: 36
End: 2021-10-29
Payer: COMMERCIAL

## 2021-10-29 VITALS
OXYGEN SATURATION: 98 % | HEART RATE: 81 BPM | SYSTOLIC BLOOD PRESSURE: 101 MMHG | RESPIRATION RATE: 16 BRPM | DIASTOLIC BLOOD PRESSURE: 63 MMHG | TEMPERATURE: 97.8 F

## 2021-10-29 DIAGNOSIS — I89.0 SECONDARY LYMPHEDEMA: Primary | ICD-10-CM

## 2021-10-29 DIAGNOSIS — J06.9 VIRAL URI: ICD-10-CM

## 2021-10-29 DIAGNOSIS — R05.9 COUGH: Primary | ICD-10-CM

## 2021-10-29 PROCEDURE — 99213 OFFICE O/P EST LOW 20 MIN: CPT | Performed by: PHYSICIAN ASSISTANT

## 2021-10-29 PROCEDURE — 97535 SELF CARE MNGMENT TRAINING: CPT | Mod: GP | Performed by: PHYSICAL THERAPIST

## 2021-10-29 PROCEDURE — U0003 INFECTIOUS AGENT DETECTION BY NUCLEIC ACID (DNA OR RNA); SEVERE ACUTE RESPIRATORY SYNDROME CORONAVIRUS 2 (SARS-COV-2) (CORONAVIRUS DISEASE [COVID-19]), AMPLIFIED PROBE TECHNIQUE, MAKING USE OF HIGH THROUGHPUT TECHNOLOGIES AS DESCRIBED BY CMS-2020-01-R: HCPCS | Performed by: PHYSICIAN ASSISTANT

## 2021-10-29 PROCEDURE — U0005 INFEC AGEN DETEC AMPLI PROBE: HCPCS | Performed by: PHYSICIAN ASSISTANT

## 2021-10-29 RX ORDER — IBUPROFEN 200 MG
200 TABLET ORAL EVERY 4 HOURS PRN
COMMUNITY
End: 2022-06-27

## 2021-10-29 RX ORDER — ACETAMINOPHEN 325 MG/1
325-650 TABLET ORAL EVERY 6 HOURS PRN
COMMUNITY
End: 2022-06-27

## 2021-10-29 RX ORDER — BENZONATATE 100 MG/1
CAPSULE ORAL
Qty: 45 CAPSULE | Refills: 0 | Status: SHIPPED | OUTPATIENT
Start: 2021-10-29 | End: 2022-06-27

## 2021-10-29 NOTE — PROGRESS NOTES
Chief Complaint   Patient presents with     Cough     over 1wk, mostly at night     Nasal Congestion     x1d, fatigue       ASSESSMENT/PLAN:  Xiao was seen today for cough and nasal congestion.    Diagnoses and all orders for this visit:    Cough  -     benzonatate (TESSALON) 100 MG capsule; Take 1-2 capsules by mouth up to 3 x a day as needed with food  -     Symptomatic COVID-19 Virus (Coronavirus) by PCR    Viral URI    Your symptoms are consistent with a viral URI.  Usually last 1 to 2 weeks and we can treat the symptoms  Continue taking the Tylenol and ibuprofen for your aches and fatigue.  For the nasal drainage I want you to start using Flonase.  Mucus down the back of your throat is likely causing the cough when you lay down.  Sudafed or Mucinex may also help with this.  Those medications can be bought over the counter.  Your testing for Covid today.  The results will be on my chart in about 2 days.  I have also prescribed Tessalon Perles which will help with your cough.    Piyush Mcarthur PA-C      SUBJECTIVE:  Xiao is a 36 year old female who presents to urgent care with 1 week of a cough and nasal congestion and developed fatigue and malaise over the last day or so.  The cough is worse when she lays down.  She says it is harsh and can be painful at times.  No wheeze or shortness of breath.  No significant headache, fever or ear pain.  No sore throat.    ROS: Pertinent ROS neg other than the symptoms noted above in the HPI.     OBJECTIVE:  /63   Pulse 81   Temp 97.8  F (36.6  C) (Oral)   Resp 16   SpO2 98%    GENERAL: healthy, alert and no distress  EYES: Eyes grossly normal to inspection, PERRL and conjunctivae and sclerae normal  HENT: ear canals and TM's normal, nose and mouth without ulcers or lesions  NECK: no adenopathy, no asymmetry, masses, or scars and thyroid normal to palpation  RESP: lungs clear to auscultation - no rales, rhonchi or wheezes  CV: regular rate and rhythm, normal S1  S2, no S3 or S4, no murmur, click or rub    DIAGNOSTICS    No results found for any visits on 10/29/21.     Current Outpatient Medications   Medication     acetaminophen (TYLENOL) 325 MG tablet     calcium carbonate 600 mg-vitamin D 400 units (CALTRATE) 600-400 MG-UNIT per tablet     Dextromethorphan HBr (VICKS DAYQUIL COUGH PO)     ibuprofen (ADVIL/MOTRIN) 200 MG tablet     letrozole (FEMARA) 2.5 MG tablet     vitamin D3 (CHOLECALCIFEROL) 125 MCG (5000 UT) tablet     DULoxetine (CYMBALTA) 30 MG capsule     magnesium oxide 400 MG CAPS     NO ACTIVE MEDICATIONS     No current facility-administered medications for this visit.      Patient Active Problem List   Diagnosis     Abnormal glandular Papanicolaou smear of cervix     Malignant neoplasm of overlapping sites of left breast in female, estrogen receptor positive (H)      Past Medical History:   Diagnosis Date     Abnormal glandular Papanicolaou smear of cervix     confusing info related to pap     Breast cancer (H)      History of colonic polyps 2021     Invasive ductal carcinoma of breast, left (H) 2021     Pain in joint, lower leg      Previous  delivery, antepartum condition or complication 2005     Past Surgical History:   Procedure Laterality Date     BIOPSY BREAST Left     benign     BIOPSY BREAST Right     benign     C  DELIVERY ONLY  10-09-04    , Low Cervical. Transfused with 2 units     C  DELIVERY ONLY N/A      COLONOSCOPY       PA MASTECTOMY, PARTIAL Left 2021    Left Lumpectomy; Reading Lymph Node Biopsy;  Surgeon: Desire Kent MD;  Location: [facility];  Service: General     US BIOPSY CORE LYMPH NODE (BREAST) LEFT Left 2021     US BREAST CORE BIOPSY LEFT Left 2021     US BREAST CORE BIOPSY RIGHT Right 2021     US BREAST CORE BIOPSY RIGHT Right 2021     US SENTINEL NODE INJECTION  2021     Family History   Problem Relation Age of Onset      Gastrointestinal Disease Mother         diverticulitis     Glaucoma Mother      Cancer Father         lung cancer     Macular Degeneration No family hx of      Diverticulitis Mother      Lung Cancer Father      No Known Problems Sister      No Known Problems Brother      No Known Problems Daughter      No Known Problems Daughter      No Known Problems Sister      No Known Problems Brother      CABG No family hx of      ICD - Implantable Cardioverter Defibrillator No family hx of      Pacemaker No family hx of      Sudden Death No family hx of      Social History     Tobacco Use     Smoking status: Never Smoker     Smokeless tobacco: Never Used   Substance Use Topics     Alcohol use: No              The plan of care was discussed with the patient. They understand and agree with the course of treatment prescribed. A printed summary was given including instructions and medications.  The use of Dragon/[employer] dictation services may have been used to construct the content in this note; any grammatical or spelling errors are non-intentional. Please contact the author of this note directly if you are in need of any clarification.

## 2021-10-30 LAB — SARS-COV-2 RNA RESP QL NAA+PROBE: NEGATIVE

## 2021-10-30 NOTE — PATIENT INSTRUCTIONS
Your symptoms are consistent with a viral URI.  Usually last 1 to 2 weeks and we can treat the symptoms  Continue taking the Tylenol and ibuprofen for your aches and fatigue.  For the nasal drainage I want you to start using Flonase.  Mucus down the back of your throat is likely causing the cough when you lay down.  Sudafed or Mucinex may also help with this.  Those medications can be bought over the counter.  Your testing for Covid today.  The results will be on my chart in about 2 days.  I have also prescribed Tessalon Perles which will help with your cough.

## 2021-11-03 ENCOUNTER — HOSPITAL ENCOUNTER (OUTPATIENT)
Dept: PHYSICAL THERAPY | Facility: REHABILITATION | Age: 36
End: 2021-11-03
Payer: COMMERCIAL

## 2021-11-03 DIAGNOSIS — I89.0 SECONDARY LYMPHEDEMA: Primary | ICD-10-CM

## 2021-11-03 PROCEDURE — 97140 MANUAL THERAPY 1/> REGIONS: CPT | Mod: GP | Performed by: PHYSICAL THERAPIST

## 2021-11-05 ENCOUNTER — HOSPITAL ENCOUNTER (OUTPATIENT)
Dept: PHYSICAL THERAPY | Facility: REHABILITATION | Age: 36
End: 2021-11-05
Payer: COMMERCIAL

## 2021-11-05 DIAGNOSIS — I89.0 SECONDARY LYMPHEDEMA: Primary | ICD-10-CM

## 2021-11-05 PROCEDURE — 97140 MANUAL THERAPY 1/> REGIONS: CPT | Mod: GP | Performed by: PHYSICAL THERAPIST

## 2021-11-09 ENCOUNTER — HOSPITAL ENCOUNTER (OUTPATIENT)
Dept: PHYSICAL THERAPY | Facility: REHABILITATION | Age: 36
End: 2021-11-09
Payer: COMMERCIAL

## 2021-11-09 DIAGNOSIS — I89.0 SECONDARY LYMPHEDEMA: Primary | ICD-10-CM

## 2021-11-09 PROCEDURE — 97110 THERAPEUTIC EXERCISES: CPT | Mod: GP | Performed by: PHYSICAL THERAPIST

## 2021-11-10 ENCOUNTER — HOSPITAL ENCOUNTER (OUTPATIENT)
Dept: PHYSICAL THERAPY | Facility: REHABILITATION | Age: 36
End: 2021-11-10
Payer: COMMERCIAL

## 2021-11-10 DIAGNOSIS — I89.0 SECONDARY LYMPHEDEMA: Primary | ICD-10-CM

## 2021-11-10 PROCEDURE — 97140 MANUAL THERAPY 1/> REGIONS: CPT | Mod: GP | Performed by: PHYSICAL THERAPIST

## 2021-11-10 NOTE — PROGRESS NOTES
Beginning/End Dates of Progress Note Reporting Period:  10/11/21 to 11/10/2021     Progress Toward Goals:   See below    Client Self (Subjective) Report for Progress Note Reporting Period: Pt has been wearing her left arm sleeve more; the forearm is better, but will have more swelling into the hand. In the morning the swelling is down.     Outcome Measures (Most Recent Score):    Not collected today     Objective Measurements:   See below       11/10/21 1000   Signing Clinician's Name / Credentials   Signing clinician's name / credentials Gabo Solitario PT, CLT   Session Number   Session Number 10/18   Progress Note/Recertification   Progress Note Due Date 01/05/22  (visit 18)   Goal 1   Goal identifier Volume   Goal description Pt will demonstrate a reduction of 5% volume in L UE to allow better fit in clothing and compression garments.    Target date 12/20/21   Goal 2   Goal identifier Compression   Goal description Pt will be educated regarding a need for compression garment and supplier.    Target date 12/20/21   Goal 3   Goal identifier Home management   Goal description Pt will be educated regarding lymphedema and precautions and demonstrate home management of lymphedema.    Target date 12/20/21   Subjective Report   Subjective Report Pt has been wearing her left arm sleeve more; the forearm is better, but will have more swelling into the hand. In the morning the swelling is down.    Objective Measures   Objective Measures Objective Measure 1;Objective Measure 2;Objective Measure 3   Objective Measure 1   Objective Measure Cording   Details L DEBBY evident - palpable cord from axilla into forearm    Objective Measure 2   Objective Measure Edema   Details L UE, no pitting posterior forearm, 2+ pitting anterior forearm (though pitting is very shallow). Fibrosis into left breast. Mild swelling left lateral breast/axilla   Objective Measure 3   Objective Measure L UE girth   Details see separate measurements - L  UE is 2% decreased from original measurements   Treatment Interventions   Interventions Therapeutic Procedure/Exercise   Manual Therapy   Manual Therapy: Mobilization, MFR, MLD, friction massage minutes (52970) 49   Skilled Intervention MLD and DEBBY treatment   Patient Response Good, tolerated everything well    Treatment Detail Short neck sequence, deep abdominal techniques, B axillary node clearing  and left inguinal node clearing followed by MLD to L UE (in supine and sidelying) using axillo-axillary and axillo-inguinal anastomoses. DEBBY techniques in L axilla, moving tissue in 3 planes of motion to improve shoulder motion - moving proximal to distal with repeated shoulder abduction/adduction and elbow flexion/extension.    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 Pt has been seen for 10 visits from 10/11/21 to present with treatment focused on manual therapy for decongestive therapy to decrease L arm swelling, decrease cording, improve L shoulder motion and improve strength. Feels 50-60% improvement since start of therapy. Pt has been fitted for a compression sleeve on the left arm, but she doesn't like this garment; she will be going to a new fitter in the next few weeks to get a new garment. Pt has been indep with [facility] for DEBBY treatment, self MLD and is awaiting delivery of her pneumatic pump.  Still learning the strengthening after breast cancer program, just initiated this yesterday. Updating plan of care to 18 visits total to continue to progress on goals and reduce pain/restriction from DEBBY further.     Total Session Time   Timed Code Treatment Minutes 49   Total Treatment Time (sum of  timed and untimed services) 49   AMBULATORY CLINICS ONLY-MEDICAL AND TREATMENT DIAGNOSIS   Medical diagnosis Invasive ductal carcinoma of breast, left       Therapy diagnosis Secondary lymphedema (I89.0)     Gabo Solitario, PT, DPT, CLT   11/10/2021

## 2021-11-12 ENCOUNTER — HOSPITAL ENCOUNTER (OUTPATIENT)
Dept: PHYSICAL THERAPY | Facility: REHABILITATION | Age: 36
End: 2021-11-12
Payer: COMMERCIAL

## 2021-11-12 DIAGNOSIS — I89.0 SECONDARY LYMPHEDEMA: Primary | ICD-10-CM

## 2021-11-12 PROCEDURE — 97110 THERAPEUTIC EXERCISES: CPT | Mod: GP | Performed by: PHYSICAL THERAPIST

## 2021-11-19 ENCOUNTER — HOSPITAL ENCOUNTER (OUTPATIENT)
Dept: PHYSICAL THERAPY | Facility: REHABILITATION | Age: 36
End: 2021-11-19
Payer: COMMERCIAL

## 2021-11-19 DIAGNOSIS — I89.0 SECONDARY LYMPHEDEMA: Primary | ICD-10-CM

## 2021-11-19 PROCEDURE — 97140 MANUAL THERAPY 1/> REGIONS: CPT | Mod: GP | Performed by: PHYSICAL THERAPIST

## 2021-11-22 ENCOUNTER — HOSPITAL ENCOUNTER (OUTPATIENT)
Dept: PHYSICAL THERAPY | Facility: REHABILITATION | Age: 36
End: 2021-11-22
Payer: COMMERCIAL

## 2021-11-22 DIAGNOSIS — I89.0 SECONDARY LYMPHEDEMA: Primary | ICD-10-CM

## 2021-11-22 PROCEDURE — 97140 MANUAL THERAPY 1/> REGIONS: CPT | Mod: GP | Performed by: PHYSICAL THERAPIST

## 2021-11-26 ENCOUNTER — HOSPITAL ENCOUNTER (OUTPATIENT)
Dept: PHYSICAL THERAPY | Facility: REHABILITATION | Age: 36
End: 2021-11-26
Payer: COMMERCIAL

## 2021-11-26 DIAGNOSIS — I89.0 SECONDARY LYMPHEDEMA: Primary | ICD-10-CM

## 2021-11-26 PROCEDURE — 97140 MANUAL THERAPY 1/> REGIONS: CPT | Mod: GP | Performed by: PHYSICAL THERAPIST

## 2021-11-29 ENCOUNTER — HOSPITAL ENCOUNTER (OUTPATIENT)
Dept: PHYSICAL THERAPY | Facility: REHABILITATION | Age: 36
End: 2021-11-29
Payer: COMMERCIAL

## 2021-11-29 DIAGNOSIS — I89.0 SECONDARY LYMPHEDEMA: Primary | ICD-10-CM

## 2021-11-29 PROCEDURE — 97110 THERAPEUTIC EXERCISES: CPT | Mod: GP | Performed by: PHYSICAL THERAPIST

## 2021-12-15 ENCOUNTER — ANCILLARY PROCEDURE (OUTPATIENT)
Dept: MAMMOGRAPHY | Facility: CLINIC | Age: 36
End: 2021-12-15
Attending: INTERNAL MEDICINE
Payer: COMMERCIAL

## 2021-12-15 DIAGNOSIS — Z17.0 MALIGNANT NEOPLASM OF OVERLAPPING SITES OF LEFT BREAST IN FEMALE, ESTROGEN RECEPTOR POSITIVE (H): ICD-10-CM

## 2021-12-15 DIAGNOSIS — C50.812 MALIGNANT NEOPLASM OF OVERLAPPING SITES OF LEFT BREAST IN FEMALE, ESTROGEN RECEPTOR POSITIVE (H): ICD-10-CM

## 2021-12-15 PROCEDURE — G0279 TOMOSYNTHESIS, MAMMO: HCPCS | Performed by: RADIOLOGY

## 2021-12-15 PROCEDURE — 77066 DX MAMMO INCL CAD BI: CPT | Performed by: RADIOLOGY

## 2021-12-22 ENCOUNTER — HOSPITAL ENCOUNTER (OUTPATIENT)
Dept: PHYSICAL THERAPY | Facility: REHABILITATION | Age: 36
End: 2021-12-22
Payer: COMMERCIAL

## 2021-12-22 DIAGNOSIS — I89.0 SECONDARY LYMPHEDEMA: Primary | ICD-10-CM

## 2021-12-22 PROCEDURE — 97140 MANUAL THERAPY 1/> REGIONS: CPT | Mod: GP | Performed by: PHYSICAL THERAPIST

## 2021-12-27 ENCOUNTER — HOSPITAL ENCOUNTER (OUTPATIENT)
Dept: PHYSICAL THERAPY | Facility: REHABILITATION | Age: 36
End: 2021-12-27
Payer: COMMERCIAL

## 2021-12-27 DIAGNOSIS — I89.0 SECONDARY LYMPHEDEMA: Primary | ICD-10-CM

## 2021-12-27 PROCEDURE — 97140 MANUAL THERAPY 1/> REGIONS: CPT | Mod: GP | Performed by: PHYSICAL THERAPIST

## 2021-12-31 ENCOUNTER — HOSPITAL ENCOUNTER (OUTPATIENT)
Dept: PHYSICAL THERAPY | Facility: REHABILITATION | Age: 36
End: 2021-12-31
Payer: COMMERCIAL

## 2021-12-31 DIAGNOSIS — I89.0 SECONDARY LYMPHEDEMA: Primary | ICD-10-CM

## 2021-12-31 PROCEDURE — 97140 MANUAL THERAPY 1/> REGIONS: CPT | Mod: GP | Performed by: PHYSICAL THERAPIST

## 2021-12-31 NOTE — PROGRESS NOTES
Progress Note       12/31/21 1300   Signing Clinician's Name / Credentials   Signing clinician's name / credentials Gabo Solitario, PT, CLT   Session Number   Session Number 18/26   Progress Note/Recertification   Progress Note Due Date 01/05/22  (visit 18)   Progress Note Completed Date 12/31/21   Goal 1   Goal identifier Volume   Goal description Pt will demonstrate a reduction of 5% volume in L UE to allow better fit in clothing and compression garments.    Target date 12/20/21   Goal 2   Goal identifier Compression   Goal description Pt will be educated regarding a need for compression garment and supplier.    Target date 12/20/21   Goal 3   Goal identifier Home management   Goal description Pt will be educated regarding lymphedema and precautions and demonstrate home management of lymphedema.    Target date 12/20/21   Subjective Report   Subjective Report Hasn't been wearing compression sleeve for several days and it's getting better. Manual therapy helps immediately. Self massage is OK but not as helpful.    Objective Measures   Objective Measures Objective Measure 1;Objective Measure 2;Objective Measure 3   Objective Measure 1   Objective Measure Palpation   Details tender at left pec major/minor - less evidence of cording into left arm distally; able to feel 2 small cords in L axilla   Objective Measure 2   Objective Measure Edema   Details fibrosis in left breast and restriction at left pec major/minor    Objective Measure 3   Objective Measure Girth measurements   Details Refer to separate flowsheet - decrease of 2.5% since initial measurements (left and right arm volumes are essentially symmetrical now)   Treatment Interventions   Interventions Manual Therapy   Manual Therapy   Manual Therapy: Mobilization, MFR, MLD, friction massage minutes ([id number]) 54   Skilled Intervention MLD, STM to reduce swelling and improve shoulder mobility   Patient Response Pain at left pec minor/major- less tender over time    Treatment Detail Measurement of L UE and pt updated on progress. Short neck sequence, B axillary node clearing followed by MLD to L UE (in supine only) using axillo-axillary anastomoses. DEBBY techniques in L axilla, moving tissue in 3 planes of motion to improve shoulder motion - moving proximal to distal with repeated shoulder abduction/adduction and elbow flexion/extension. STM to left pec major/minor with shoulder in resting position and with hands behind head position.   Plan   Homework DEBBY stretches - hooklying HBH and wall flx AAROM, PTRx, self MLD daily, PTRx, strengthening after breast cancer, pec minor STM.    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 Pt has been seen for 18 visits total from 10/11/21 to present with treatment focused on decongestive therapy to reduce the size of the L arm and therapeutic exercise to reduce lymphedema risk. Measurements indicate an overall reduction in L UE size of 2.5% since initial measurements. Pt has been fitted for compression sleeve/glove, but pt hasn't been wearing compression this past week and she hasn't had hand swelling as a result. Progress has been made on all goals and pt has been educated in an exercise program to reduce risk of lymphedema flare ups. Updating POC for additional 8 visits to continue to progress L shoulder motion d/t restriction at pec minor and related to cording.    Total Session Time   Timed Code Treatment Minutes 54   Total Treatment Time (sum of timed and untimed services) 54   AMBULATORY CLINICS ONLY-MEDICAL AND TREATMENT DIAGNOSIS   Medical diagnosis Invasive ductal carcinoma of breast, left       Therapy diagnosis Secondary lymphedema (I89.0)     Gabo  Kassi, PT, DPT, CLT  12/31/2021

## 2022-01-07 NOTE — PROGRESS NOTES
[facility]    Outpatient Physical Therapy Discharge Note  Patient: Xiao Beal  : 1985    Beginning/End Dates of Reporting Period:  10/11/21 to 21    Referring Provider: Iza Gates Diagnosis: Secondary lymphedema      Client Self Report: Hasn't been wearing compression sleeve for several days and it's getting better. Manual therapy helps immediately. Self massage is OK but not as helpful.     Objective Measurements:  See last treatment session     Outcome Measures (most recent score):  N/A    Goals:  See last treatment session    Plan:  Discharge from therapy.    Discharge:    Reason for Discharge: Patient chooses to discontinue therapy.      Discharge Plan: Pt to continue home program; with new year beginning, pt will have to pay out of pocket for therapy services and cannot continue.         Gabo Solitario, PT, DPT, CLT  2022

## 2022-01-17 ENCOUNTER — VIRTUAL VISIT (OUTPATIENT)
Dept: ONCOLOGY | Facility: CLINIC | Age: 37
End: 2022-01-17
Attending: INTERNAL MEDICINE
Payer: COMMERCIAL

## 2022-01-17 ENCOUNTER — TRANSFERRED RECORDS (OUTPATIENT)
Dept: HEALTH INFORMATION MANAGEMENT | Facility: CLINIC | Age: 37
End: 2022-01-17

## 2022-01-17 DIAGNOSIS — C50.812 MALIGNANT NEOPLASM OF OVERLAPPING SITES OF LEFT BREAST IN FEMALE, ESTROGEN RECEPTOR POSITIVE (H): Primary | ICD-10-CM

## 2022-01-17 DIAGNOSIS — Z79.811 USE OF AROMATASE INHIBITORS: ICD-10-CM

## 2022-01-17 DIAGNOSIS — Z17.0 MALIGNANT NEOPLASM OF OVERLAPPING SITES OF LEFT BREAST IN FEMALE, ESTROGEN RECEPTOR POSITIVE (H): Primary | ICD-10-CM

## 2022-01-17 PROCEDURE — 99214 OFFICE O/P EST MOD 30 MIN: CPT | Mod: 95 | Performed by: INTERNAL MEDICINE

## 2022-01-17 RX ORDER — ANASTROZOLE 1 MG/1
1 TABLET ORAL DAILY
Qty: 90 TABLET | Refills: 3 | Status: SHIPPED | OUTPATIENT
Start: 2022-01-17 | End: 2023-01-11

## 2022-01-17 NOTE — PROGRESS NOTES
January 17, 2022    Medical Oncology Follow-up    Reason for visit: left breast cancer     HPI:  This is a 36 y.o. Female (older than her stated age likely in her 50s based on immigration and change of age), with T2N1a left breast cancer, ER positive. This was picked up by the patient. She noticed an indentation or dimpling of her breast several months ago. She brought it up to her physician when she was seen because of neck and arm pain after a Covid vaccine. She underwent a mammogram and ultrasound. A needle biopsy was done which shows an invasive ductal carcinoma. It is estrogen receptor positive 95%, progesterone receptor positive weakly 3% and HER-2 negative 0 by IHC.  R breast revealed two fibroadenomas.  L breast - 11x13 mm mass in the 1 oclock position 9 cm from the nipple.  Lymph nodes appeared normal.       She met with Dr Kent.  She underwent a lumpectomy with SNLB.      Oncotype Dx score returned at 17 giving her a risk of metastatic disease of 15% at 10 years.  No benefit from chemotherapy. I recommended adjuvant radiation and adjuvant endocrine therapy. Lab work confirmed postmenopausal (she told me her age is not accurate in the computer).     Interval History: Xiao is here today for follow up via video visit. She reports that she has been having a lot of issues with joint pains. She reports pain her her neck and shoulder and upper back, but most bothersome is the right knee. Her right nee has been hurting for about 2-3 weeks now. She does have a history of meniscal damage in that knee and has had cortisol injections in the past, but has not needed one for 6 years now. She did self discontinue the letrozole for 4 days to see if that would help, and she did notice an improvement in the joint pain when she came off but she did not want to go off completely without talking to us so she restarted it after 4 days.      She has an appt with orthopedics today to see if the meniscal issues might bee acting  up or if its just the AI.  She did work with lymphedema therapy in November and December and that was very helpful. She was taught exercises and uses a lymphedema wrap for her arm which she feels keeps the swelling under control. She still struggles with pain in the left armpit and underneath the shoulder, and wonders if that's all just related to the surgery. She uses tylenol and ibuprofen. Heat packs just make the pain worse.      ROS: 10 point ROS neg other than the symptoms noted above in the HPI.      Past Medical History:   Diagnosis Date     Abnormal glandular Papanicolaou smear of cervix     confusing info related to pap     Breast cancer (H)      History of colonic polyps 2021     Invasive ductal carcinoma of breast, left (H) 2021     Pain in joint, lower leg      Previous  delivery, antepartum condition or complication 2005     Current Outpatient Medications   Medication     acetaminophen (TYLENOL) 325 MG tablet     benzonatate (TESSALON) 100 MG capsule     calcium carbonate 600 mg-vitamin D 400 units (CALTRATE) 600-400 MG-UNIT per tablet     Dextromethorphan HBr (VICKS DAYQUIL COUGH PO)     DULoxetine (CYMBALTA) 30 MG capsule     ibuprofen (ADVIL/MOTRIN) 200 MG tablet     letrozole (FEMARA) 2.5 MG tablet     magnesium oxide 400 MG CAPS     NO ACTIVE MEDICATIONS     vitamin D3 (CHOLECALCIFEROL) 125 MCG (5000 UT) tablet     No current facility-administered medications for this visit.       PHYSICAL EXAM:  There were no vitals taken for this visit. Video visit.   Alert and oriented. No distress  Breathing comfortably on room air on video visit  Appropriate mood and affect    Reviewed her recent mammogram personally.    A/P:  37 y/o female with a pT2N1a left breast cancer s/p lumpectomy and SNLB.  The tumor is ER + essentially SD negative, her2 negative.    Left breast cancer: S/p lumpectomy + SLNB s/p adjuvant radiation. Unfortunately she is having sigificant joint aches with the  letrozole that is affecting her quality of life. She is going to see orthopedics today to see if her previous knee joint meniscal issues might be contributing to the pain, but we discussed today that we would favor discontinuing the letrozole at this time and giving her a break for 2 weeks.  After 2 weeks, we will switch to anastrazole to see if that is better tolerated for her.     Bone health: DEXA scan was reviewed showing osteopenia. Discussed calcium and vitamin D and weight bearing exercise. We discussed bisphosphonates as prophylaxis.  We will rediscuss again at next visit; pt would like to think about it.    Lymphedema - She worked with lymphedema clinic and is using an arm wrap and doing exercises and those have helped a lot. She still struggles with pain in her left armpit and shoulder area. She uses tylenol and ibuprofen. Heat packs make it worse. Discussed continuing those measures that help, and that these things often improve with time.     Arthralgias secondary to aromatase inhibitors ( AIMSS) - on duloxetine.  Encouraged exercise.    Follow up in 6 months with Dr. Kenny. She will call us or ViewRayt message us if the anastrazole is not going well.     Ritu Verduzco MD  Hematology/Oncology Fellow    Addendum:  Pt was seen and evaluated by me with Dr Verduzco.  I reviewed the above to reflect my evaluation.    Consider trial of anastrazole.      Mammogram stable.      Cristy Kenny MD

## 2022-01-17 NOTE — LETTER
1/17/2022         RE: Xiao Beal  [address] [place] [address]        Dear Colleague,    Thank you for referring your patient, Xiao Beal, to the [facility]. Please see a copy of my visit note below.    January 17, 2022    Medical Oncology Follow-up    Reason for visit: left breast cancer     HPI:  This is a 36 y.o. Female (older than her stated age likely in her 50s based on immigration and change of age), with T2N1a left breast cancer, ER positive. This was picked up by the patient. She noticed an indentation or dimpling of her breast several months ago. She brought it up to her physician when she was seen because of neck and arm pain after a Covid vaccine. She underwent a mammogram and ultrasound. A needle biopsy was done which shows an invasive ductal carcinoma. It is estrogen receptor positive 95%, progesterone receptor positive weakly 3% and HER-2 negative 0 by IHC.  R breast revealed two fibroadenomas.  L breast - 11x13 mm mass in the 1 oclock position 9 cm from the nipple.  Lymph nodes appeared normal.       She met with Dr Kent.  She underwent a lumpectomy with SNLB.      Oncotype Dx score returned at 17 giving her a risk of metastatic disease of 15% at 10 years.  No benefit from chemotherapy. I recommended adjuvant radiation and adjuvant endocrine therapy. Lab work confirmed postmenopausal (she told me her age is not accurate in the computer).     Interval History: Xiao is here today for follow up via video visit. She reports that she has been having a lot of issues with joint pains. She reports pain her her neck and shoulder and upper back, but most bothersome is the right knee. Her right nee has been hurting for about 2-3 weeks now. She does have a history of meniscal damage in that knee and has had cortisol injections in the past, but has not needed one for 6 years now. She did self discontinue the letrozole for 4 days to see if that would help, and she did  notice an improvement in the joint pain when she came off but she did not want to go off completely without talking to us so she restarted it after 4 days.      She has an appt with orthopedics today to see if the meniscal issues might bee acting up or if its just the AI.  She did work with lymphedema therapy in November and December and that was very helpful. She was taught exercises and uses a lymphedema wrap for her arm which she feels keeps the swelling under control. She still struggles with pain in the left armpit and underneath the shoulder, and wonders if that's all just related to the surgery. She uses tylenol and ibuprofen. Heat packs just make the pain worse.      ROS: 10 point ROS neg other than the symptoms noted above in the HPI.      Past Medical History:   Diagnosis Date     Abnormal glandular Papanicolaou smear of cervix     confusing info related to pap     Breast cancer (H)      History of colonic polyps 2021     Invasive ductal carcinoma of breast, left (H) 2021     Pain in joint, lower leg      Previous  delivery, antepartum condition or complication 2005     Current Outpatient Medications   Medication     acetaminophen (TYLENOL) 325 MG tablet     benzonatate (TESSALON) 100 MG capsule     calcium carbonate 600 mg-vitamin D 400 units (CALTRATE) 600-400 MG-UNIT per tablet     Dextromethorphan HBr (VICKS DAYQUIL COUGH PO)     DULoxetine (CYMBALTA) 30 MG capsule     ibuprofen (ADVIL/MOTRIN) 200 MG tablet     letrozole (FEMARA) 2.5 MG tablet     magnesium oxide 400 MG CAPS     NO ACTIVE MEDICATIONS     vitamin D3 (CHOLECALCIFEROL) 125 MCG (5000 UT) tablet     No current facility-administered medications for this visit.       PHYSICAL EXAM:  There were no vitals taken for this visit. Video visit.   Alert and oriented. No distress  Breathing comfortably on room air on video visit  Appropriate mood and affect    Reviewed her recent mammogram personally.    A/P:  37 y/o female  with a pT2N1a left breast cancer s/p lumpectomy and SNLB.  The tumor is ER + essentially KS negative, her2 negative.    Left breast cancer: S/p lumpectomy + SLNB s/p adjuvant radiation. Unfortunately she is having sigificant joint aches with the letrozole that is affecting her quality of life. She is going to see orthopedics today to see if her previous knee joint meniscal issues might be contributing to the pain, but we discussed today that we would favor discontinuing the letrozole at this time and giving her a break for 2 weeks.  After 2 weeks, we will switch to anastrazole to see if that is better tolerated for her.     Bone health: DEXA scan was reviewed showing osteopenia. Discussed calcium and vitamin D and weight bearing exercise. We discussed bisphosphonates as prophylaxis.  We will rediscuss again at next visit; pt would like to think about it.    Lymphedema - She worked with lymphedema clinic and is using an arm wrap and doing exercises and those have helped a lot. She still struggles with pain in her left armpit and shoulder area. She uses tylenol and ibuprofen. Heat packs make it worse. Discussed continuing those measures that help, and that these things often improve with time.     Arthralgias secondary to aromatase inhibitors ( AIMSS) - on duloxetine.  Encouraged exercise.    Follow up in 6 months with Dr. Kenny. She will call us or [email or web address] message us if the anastrazole is not going well.     Ritu Verduzco MD  Hematology/Oncology Fellow    Addendum:  Pt was seen and evaluated by me with Dr Verduzco.  I reviewed the above to reflect my evaluation.    Consider trial of anastrazole.      Mammogram stable.      Cristy Kenny MD

## 2022-05-19 ENCOUNTER — OFFICE VISIT (OUTPATIENT)
Dept: FAMILY MEDICINE | Facility: CLINIC | Age: 37
End: 2022-05-19
Payer: COMMERCIAL

## 2022-05-19 VITALS
DIASTOLIC BLOOD PRESSURE: 65 MMHG | HEART RATE: 73 BPM | TEMPERATURE: 97.9 F | SYSTOLIC BLOOD PRESSURE: 101 MMHG | OXYGEN SATURATION: 98 % | RESPIRATION RATE: 16 BRPM

## 2022-05-19 DIAGNOSIS — K21.00 GASTROESOPHAGEAL REFLUX DISEASE WITH ESOPHAGITIS, UNSPECIFIED WHETHER HEMORRHAGE: ICD-10-CM

## 2022-05-19 DIAGNOSIS — K59.00 CONSTIPATION, UNSPECIFIED CONSTIPATION TYPE: Primary | ICD-10-CM

## 2022-05-19 PROCEDURE — 99214 OFFICE O/P EST MOD 30 MIN: CPT | Performed by: PHYSICIAN ASSISTANT

## 2022-05-19 RX ORDER — POLYETHYLENE GLYCOL 3350 17 G/17G
1 POWDER, FOR SOLUTION ORAL DAILY
Qty: 255 G | Refills: 0 | Status: SHIPPED | OUTPATIENT
Start: 2022-05-19 | End: 2022-05-26

## 2022-05-19 ASSESSMENT — ENCOUNTER SYMPTOMS
NEUROLOGICAL NEGATIVE: 1
CONSTIPATION: 1
PSYCHIATRIC NEGATIVE: 1
ABDOMINAL PAIN: 1
NAUSEA: 1

## 2022-05-19 NOTE — PROGRESS NOTES
Patient presents with:  Abdominal Pain: Pain in upper left abdominal pain/into chest. Pain durations are getting longer.       Clinical Decision Making: Differentials discussed and symptoms indicative of GERD and constipation, prescribed Miralax and omprazole. Patient was reassured that these sxs do not sound consistent with return of breast cancer. This may be aggravated by her hormonal medication, but I don't recommend discontinuing it. Patient was also educated on supportive cares for GERD. She will follow up with PCP in 1 month.       ICD-10-CM    1. Constipation, unspecified constipation type  K59.00 polyethylene glycol (MIRALAX) 17 GM/Dose powder   2. Gastroesophageal reflux disease with esophagitis, unspecified whether hemorrhage  K21.00 omeprazole (PRILOSEC) 20 MG DR capsule       Patient Instructions   -Take medications as prescribed. Prilosec should be taken on an empty stomach 30 min prior to your first meal of the day.   -Take Miralax, 1 capful daily, for one week. After that switch to a teaspoon of metamucil daily for maintenance.  -Maintain physical activity  -Eat small meals, increase soluble fiber  -Avoid lying down after meals  -Elevate head of bed  -Avoid caffeine and alcohol  -Keep a follow up visit with your primary care provider.       HPI:  Xiao Beal is a 37 year old female who presents today complaining of of a 2 month history of intermittent heartburn,  Tense,burning epigastric sensation. She reports a ho GERD for which she takes Tums. She reports intermittent globus throat and epigastric sensation and cough with bending and lying down. She reports small emesis after eating fast food yesterday which prompted her to come to clinic today.   She has a Ho breast Ca and is currently on a hormone maintanance tx (arimidex) she fears is causing her symptoms.   She is also c/o constipation.     History obtained from the patient.    Problem List:  2021-03: Malignant neoplasm of overlapping sites of  left breast in   female, estrogen receptor positive (H)  2018: actual AGE greater than recorded  2009: Knee pain  2006: iamLUMBAGO  2006: Disorder of sacrum  2006: iamFALL FROM SLIP,TRIP,STUMBLE NEC  2005: UC pos GBS  2005: Previous  delivery, antepartum condition or   complication  2005: Encounter for supervision of other normal pregnancy  Abnormal glandular Papanicolaou smear of cervix      Past Medical History:   Diagnosis Date     Abnormal glandular Papanicolaou smear of cervix     confusing info related to pap     Breast cancer (H)      History of colonic polyps 2021     Invasive ductal carcinoma of breast, left (H) 2021     Pain in joint, lower leg      Previous  delivery, antepartum condition or complication 2005       Social History     Tobacco Use     Smoking status: Never Smoker     Smokeless tobacco: Never Used   Substance Use Topics     Alcohol use: No       Review of Systems   Gastrointestinal: Positive for abdominal pain (epigastric), constipation and nausea.   Genitourinary: Negative.    Neurological: Negative.    Psychiatric/Behavioral: Negative.        Vitals:    22 1015   BP: 101/65   BP Location: Right arm   Patient Position: Chair   Cuff Size: Adult Regular   Pulse: 73   Resp: 16   Temp: 97.9  F (36.6  C)   TempSrc: Tympanic   SpO2: 98%       Physical Exam  Constitutional:       Appearance: Normal appearance.   HENT:      Head: Normocephalic.      Mouth/Throat:      Mouth: Mucous membranes are moist.   Cardiovascular:      Rate and Rhythm: Normal rate and regular rhythm.   Pulmonary:      Effort: Pulmonary effort is normal.      Breath sounds: Normal breath sounds.   Abdominal:      General: Bowel sounds are normal.      Palpations: Abdomen is soft.      Tenderness: There is no abdominal tenderness.       Skin:     General: Skin is warm and dry.   Neurological:      Mental Status: She is alert.         At the end of the encounter, I  discussed results, diagnosis, medications. Discussed red flags for immediate return to clinic/ER, as well as indications for follow up if no improvement. Patient understood and agreed to plan. Patient was stable for discharge.

## 2022-06-27 ENCOUNTER — OFFICE VISIT (OUTPATIENT)
Dept: FAMILY MEDICINE | Facility: CLINIC | Age: 37
End: 2022-06-27
Payer: COMMERCIAL

## 2022-06-27 VITALS
WEIGHT: 141.4 LBS | HEART RATE: 75 BPM | OXYGEN SATURATION: 98 % | HEIGHT: 63 IN | BODY MASS INDEX: 25.05 KG/M2 | DIASTOLIC BLOOD PRESSURE: 57 MMHG | SYSTOLIC BLOOD PRESSURE: 112 MMHG

## 2022-06-27 DIAGNOSIS — C50.912 INVASIVE DUCTAL CARCINOMA OF BREAST, LEFT (H): ICD-10-CM

## 2022-06-27 DIAGNOSIS — Z00.00 ROUTINE GENERAL MEDICAL EXAMINATION AT A HEALTH CARE FACILITY: Primary | ICD-10-CM

## 2022-06-27 DIAGNOSIS — Z12.4 CERVICAL CANCER SCREENING: ICD-10-CM

## 2022-06-27 PROBLEM — G43.009 MIGRAINE WITHOUT AURA: Status: ACTIVE | Noted: 2022-06-27

## 2022-06-27 PROBLEM — R07.2 PRECORDIAL PAIN: Status: ACTIVE | Noted: 2021-05-19

## 2022-06-27 LAB
ALBUMIN SERPL-MCNC: 4 G/DL (ref 3.5–5)
ALP SERPL-CCNC: 77 U/L (ref 45–120)
ALT SERPL W P-5'-P-CCNC: 14 U/L (ref 0–45)
ANION GAP SERPL CALCULATED.3IONS-SCNC: 10 MMOL/L (ref 5–18)
AST SERPL W P-5'-P-CCNC: 19 U/L (ref 0–40)
BASOPHILS # BLD AUTO: 0 10E3/UL (ref 0–0.2)
BASOPHILS NFR BLD AUTO: 0 %
BILIRUB SERPL-MCNC: 0.5 MG/DL (ref 0–1)
BUN SERPL-MCNC: 11 MG/DL (ref 8–22)
CALCIUM SERPL-MCNC: 9.8 MG/DL (ref 8.5–10.5)
CHLORIDE BLD-SCNC: 105 MMOL/L (ref 98–107)
CHOLEST SERPL-MCNC: 220 MG/DL
CO2 SERPL-SCNC: 29 MMOL/L (ref 22–31)
CREAT SERPL-MCNC: 0.7 MG/DL (ref 0.6–1.1)
DEPRECATED CALCIDIOL+CALCIFEROL SERPL-MC: 31 UG/L (ref 20–75)
EOSINOPHIL # BLD AUTO: 0.1 10E3/UL (ref 0–0.7)
EOSINOPHIL NFR BLD AUTO: 3 %
ERYTHROCYTE [DISTWIDTH] IN BLOOD BY AUTOMATED COUNT: 12.2 % (ref 10–15)
FASTING STATUS PATIENT QL REPORTED: YES
GFR SERPL CREATININE-BSD FRML MDRD: >90 ML/MIN/1.73M2
GLUCOSE BLD-MCNC: 89 MG/DL (ref 70–125)
HCT VFR BLD AUTO: 40.2 % (ref 35–47)
HDLC SERPL-MCNC: 57 MG/DL
HGB BLD-MCNC: 13.3 G/DL (ref 11.7–15.7)
IMM GRANULOCYTES # BLD: 0 10E3/UL
IMM GRANULOCYTES NFR BLD: 0 %
LDLC SERPL CALC-MCNC: 146 MG/DL
LYMPHOCYTES # BLD AUTO: 1.4 10E3/UL (ref 0.8–5.3)
LYMPHOCYTES NFR BLD AUTO: 28 %
MCH RBC QN AUTO: 31.9 PG (ref 26.5–33)
MCHC RBC AUTO-ENTMCNC: 33.1 G/DL (ref 31.5–36.5)
MCV RBC AUTO: 96 FL (ref 78–100)
MONOCYTES # BLD AUTO: 0.4 10E3/UL (ref 0–1.3)
MONOCYTES NFR BLD AUTO: 7 %
NEUTROPHILS # BLD AUTO: 3 10E3/UL (ref 1.6–8.3)
NEUTROPHILS NFR BLD AUTO: 62 %
PLATELET # BLD AUTO: 217 10E3/UL (ref 150–450)
POTASSIUM BLD-SCNC: 4 MMOL/L (ref 3.5–5)
PROT SERPL-MCNC: 7.2 G/DL (ref 6–8)
RBC # BLD AUTO: 4.17 10E6/UL (ref 3.8–5.2)
SODIUM SERPL-SCNC: 144 MMOL/L (ref 136–145)
TRIGL SERPL-MCNC: 84 MG/DL
WBC # BLD AUTO: 4.9 10E3/UL (ref 4–11)

## 2022-06-27 PROCEDURE — 36415 COLL VENOUS BLD VENIPUNCTURE: CPT | Performed by: FAMILY MEDICINE

## 2022-06-27 PROCEDURE — 0053A COVID-19,PF,PFIZER (12+ YRS): CPT | Performed by: FAMILY MEDICINE

## 2022-06-27 PROCEDURE — 90471 IMMUNIZATION ADMIN: CPT | Performed by: FAMILY MEDICINE

## 2022-06-27 PROCEDURE — 80061 LIPID PANEL: CPT | Performed by: FAMILY MEDICINE

## 2022-06-27 PROCEDURE — 99395 PREV VISIT EST AGE 18-39: CPT | Mod: 25 | Performed by: FAMILY MEDICINE

## 2022-06-27 PROCEDURE — 88142 CYTOPATH C/V THIN LAYER: CPT | Performed by: FAMILY MEDICINE

## 2022-06-27 PROCEDURE — 90677 PCV20 VACCINE IM: CPT | Performed by: FAMILY MEDICINE

## 2022-06-27 PROCEDURE — 80053 COMPREHEN METABOLIC PANEL: CPT | Performed by: FAMILY MEDICINE

## 2022-06-27 PROCEDURE — 85025 COMPLETE CBC W/AUTO DIFF WBC: CPT | Performed by: FAMILY MEDICINE

## 2022-06-27 PROCEDURE — 82306 VITAMIN D 25 HYDROXY: CPT | Performed by: FAMILY MEDICINE

## 2022-06-27 PROCEDURE — 91305 COVID-19,PF,PFIZER (12+ YRS): CPT | Performed by: FAMILY MEDICINE

## 2022-06-27 PROCEDURE — 87624 HPV HI-RISK TYP POOLED RSLT: CPT | Performed by: FAMILY MEDICINE

## 2022-06-27 ASSESSMENT — ENCOUNTER SYMPTOMS
HEMATURIA: 0
COUGH: 0
BREAST MASS: 0
WEAKNESS: 0
PALPITATIONS: 0
ABDOMINAL PAIN: 0
SHORTNESS OF BREATH: 0
EYE PAIN: 0
PARESTHESIAS: 0
DIARRHEA: 0
SORE THROAT: 0
DIZZINESS: 0
HEADACHES: 0
FEVER: 0
HEARTBURN: 0
DYSURIA: 0
HEMATOCHEZIA: 0
NERVOUS/ANXIOUS: 0
NAUSEA: 0
CONSTIPATION: 1
CHILLS: 0
JOINT SWELLING: 0
FREQUENCY: 0
ARTHRALGIAS: 0
MYALGIAS: 0

## 2022-06-27 NOTE — PROGRESS NOTES
ASSESSMENT/PLAN:       ICD-10-CM    1. Routine general medical examination at a health care facility  Z00.00 Pneumococcal 20 Valent Conjugate (Prevnar 20)     Comprehensive metabolic panel (BMP + Alb, Alk Phos, ALT, AST, Total. Bili, TP)     Lipid panel     CBC with platelets and differential     Vitamin D Deficiency     Comprehensive metabolic panel (BMP + Alb, Alk Phos, ALT, AST, Total. Bili, TP)     Lipid panel     CBC with platelets and differential     Vitamin D Deficiency   2. Cervical cancer screening  Z12.4 Pap screen with HPV - recommended age 30 - 65 years   3. Invasive ductal carcinoma of breast, left (H)  C50.912      Medical decision making: Patient is here today for routine health maintenance exam.  She is status post treatment for left invasive ductal carcinoma with lumpectomy and radiotherapy.  She is on anastrozole which she is not using right now because of side effect but plans to resume this.  She has an appointment with her oncologist in mid July.  Note that patient's biological age is 50.    Patient is postmenopausal and has not been sexually active since her  passed away in 2017.  There is a mention of?  Normal Pap smear history in the past.  Patient declines any.  Review of Pap smear done in 2018 is normal with negative HPV.  Review of another Pap smear done in 2011 is normal without HPV.  We will plan to pursue a Pap today for definitive follow-up plans.    Today's exam shows some hardness on the left lower breast.  Patient thinks this may be post radiation changes.  I have asked her to follow with oncologist and have them examined to see if any further evaluation is needed.    Patient has chronic constipation and today we discussed about OTC MiraLAX to use daily and then on an as-needed basis with the plan to have a soft BM every other day.  She has some hearing loss and has been evaluated by audiology.      Patient has been advised of split billing requirements and indicates  "understanding: Yes    COUNSELING:  Reviewed preventive health counseling, as reflected in patient instructions       Regular exercise       Healthy diet/nutrition       Vision screening       Hearing screening       Immunizations    Vaccinated for: Pneumococcal          Estimated body mass index is 25.05 kg/m  as calculated from the following:    Height as of this encounter: 1.6 m (5' 3\").    Weight as of this encounter: 64.1 kg (141 lb 6.4 oz).        She reports that she has never smoked. She has never used smokeless tobacco.       SUBJECTIVE:   CC: Xiao Beal is an 37 year old woman who presents for preventive health visit.   Chief Complaint   Patient presents with     Physical     Pt is fasting today, pt mentioned she might of had covid at the beginning of June and still recovering from that. No other concerns. Pap smear due today, breast exam as well.          Patient has been advised of split billing requirements and indicates understanding: Yes  Healthy Habits:     Getting at least 3 servings of Calcium per day:  Yes    Bi-annual eye exam:  NO    Dental care twice a year:  Yes    Sleep apnea or symptoms of sleep apnea:  None    Diet:  Regular (no restrictions)    Frequency of exercise:  1 day/week    Duration of exercise:  N/A    Taking medications regularly:  Yes    Barriers to taking medications:  None    Medication side effects:  Muscle aches    PHQ-2 Total Score: 0    Additional concerns today:  No          Today's PHQ-2 Score:   PHQ-2 ( 1999 Pfizer) 6/27/2022   Q1: Little interest or pleasure in doing things 0   Q2: Feeling down, depressed or hopeless 0   PHQ-2 Score 0   Q1: Little interest or pleasure in doing things Not at all   Q2: Feeling down, depressed or hopeless Not at all   PHQ-2 Score 0       Abuse: Current or Past (Physical, Sexual or Emotional) - No  Do you feel safe in your environment? Yes    Have you ever done Advance Care Planning? (For example, a Health Directive, POLST, or a " discussion with a medical provider or your loved ones about your wishes): No, advance care planning information given to patient to review.  Patient plans to discuss their wishes with loved ones or provider.      Social History     Tobacco Use     Smoking status: Never Smoker     Smokeless tobacco: Never Used   Substance Use Topics     Alcohol use: No     If you drink alcohol do you typically have >3 drinks per day or >7 drinks per week? Not applicable    Alcohol Use 2022   Prescreen: >3 drinks/day or >7 drinks/week? Not Applicable   Prescreen: >3 drinks/day or >7 drinks/week? -   No flowsheet data found.    Reviewed orders with patient.  Reviewed health maintenance and updated orders accordingly - Yes  Labs reviewed in EPIC  BP Readings from Last 3 Encounters:   22 112/57   22 101/65   10/29/21 101/63    Wt Readings from Last 3 Encounters:   22 64.1 kg (141 lb 6.4 oz)   10/08/21 65.3 kg (144 lb)   21 66.2 kg (146 lb)                  Patient Active Problem List   Diagnosis     Abnormal glandular Papanicolaou smear of cervix     Malignant neoplasm of overlapping sites of left breast in female, estrogen receptor positive (H)     Invasive ductal carcinoma of breast, left (H)     Migraine without aura     Precordial pain     Past Surgical History:   Procedure Laterality Date     BIOPSY BREAST Left     benign     BIOPSY BREAST Right     benign     COLONOSCOPY       IL MASTECTOMY, PARTIAL Left 2021    Left Lumpectomy; [place] Lymph Node Biopsy;  Surgeon: Desire Kent MD;  Location: [facility];  Service: General     US BIOPSY CORE LYMPH NODE (BREAST) LEFT Left 2021     US BREAST CORE BIOPSY LEFT Left 2021     US BREAST CORE BIOPSY RIGHT Right 2021     US BREAST CORE BIOPSY RIGHT Right 2021     US SENTINEL NODE INJECTION  2021     [facility]  DELIVERY ONLY  10-09-04    , Low Cervical. Transfused with 2 units     [facility]   DELIVERY ONLY N/A        Social History     Tobacco Use     Smoking status: Never Smoker     Smokeless tobacco: Never Used   Substance Use Topics     Alcohol use: No     Family History   Problem Relation Age of Onset     Gastrointestinal Disease Mother         diverticulitis     Glaucoma Mother      Cancer Father         lung cancer     Macular Degeneration No family hx of      Diverticulitis Mother      Lung Cancer Father      No Known Problems Sister      No Known Problems Brother      No Known Problems Daughter      No Known Problems Daughter      No Known Problems Sister      No Known Problems Brother      CABG No family hx of      ICD - Implantable Cardioverter Defibrillator No family hx of      Pacemaker No family hx of      Sudden Death No family hx of            Breast Cancer Screening:    FHS-7:   Breast CA Risk Assessment (FHS-7) 12/15/2021   Did any of your first-degree relatives have breast or ovarian cancer? No   Did any of your relatives have bilateral breast cancer? No   Did any man in your family have breast cancer? No   Did any woman in your family have breast and ovarian cancer? No   Did any woman in your family have breast cancer before age 50 y? No   Do you have 2 or more relatives with breast and/or ovarian cancer? No   Do you have 2 or more relatives with breast and/or bowel cancer? No       Mammogram Screening - Alternate mammogram schedule due to breast cancer history  Pertinent mammograms are reviewed under the imaging tab.    History of abnormal Pap smear: NO - age 30-65 PAP every 5 years with negative HPV co-testing recommended  PAP / HPV Latest Ref Rng & Units 3/9/2018 12/8/2005   PAP (Historical) - NIL NIL   HPV16 NEG:Negative Negative -   HPV18 NEG:Negative Negative -   HPV - See Scanned Report -   HRHPV NEG:Negative Negative -     Reviewed and updated as needed this visit by clinical staff   Tobacco  Allergies  Meds  Problems  Med Hx  Surg Hx  Fam Hx            Reviewed and  updated as needed this visit by Provider   Tobacco  Allergies  Meds  Problems  Med Hx  Surg Hx  Fam Hx           Past Medical History:   Diagnosis Date     Abnormal glandular Papanicolaou smear of cervix     confusing info related to pap     Breast cancer (H)      History of colonic polyps 2021     Invasive ductal carcinoma of breast, left (H) 2021     Pain in joint, lower leg      Previous  delivery, antepartum condition or complication 2005      Past Surgical History:   Procedure Laterality Date     BIOPSY BREAST Left     benign     BIOPSY BREAST Right     benign     COLONOSCOPY       OK MASTECTOMY, PARTIAL Left 2021    Left Lumpectomy; [place] Lymph Node Biopsy;  Surgeon: Desire Kent MD;  [facility]: [facility];  Service: General     US BIOPSY CORE LYMPH NODE (BREAST) LEFT Left 2021     US BREAST CORE BIOPSY LEFT Left 2021     US BREAST CORE BIOPSY RIGHT Right 2021     US BREAST CORE BIOPSY RIGHT Right 2021      SENTINEL NODE INJECTION  2021     [facility]  DELIVERY ONLY  10-09-04    , Low Cervical. Transfused with 2 units     [facility]  DELIVERY ONLY N/A        Review of Systems   Constitutional: Negative for chills and fever.   HENT: Positive for hearing loss. Negative for congestion, ear pain and sore throat.    Eyes: Negative for pain and visual disturbance.   Respiratory: Negative for cough and shortness of breath.    Cardiovascular: Negative for chest pain, palpitations and peripheral edema.   Gastrointestinal: Positive for constipation. Negative for abdominal pain, diarrhea, heartburn, hematochezia and nausea.   Breasts:  Negative for tenderness, breast mass and discharge.   Genitourinary: Negative for dysuria, frequency, genital sores, hematuria, pelvic pain, urgency, vaginal bleeding and vaginal discharge.   Musculoskeletal: Negative for arthralgias, joint swelling and myalgias.   Skin: Negative for  "rash.   Neurological: Negative for dizziness, weakness, headaches and paresthesias.   Psychiatric/Behavioral: Negative for mood changes. The patient is not nervous/anxious.           OBJECTIVE:   /57 (BP Location: Left arm, Patient Position: Sitting, Cuff Size: Adult Regular)   Pulse 75   Ht 1.6 m (5' 3\")   Wt 64.1 kg (141 lb 6.4 oz)   SpO2 98%   BMI 25.05 kg/m    Physical Exam  GENERAL: healthy, alert and no distress  EYES: Eyes grossly normal to inspection, PERRL and conjunctivae and sclerae normal  HENT: ear canals and TM's normal, nose and mouth without ulcers or lesions  NECK: no adenopathy, no asymmetry, masses, or scars and thyroid normal to palpation  RESP: lungs clear to auscultation - no rales, rhonchi or wheezes  BREAST: normal without masses, tenderness or nipple discharge, no palpable axillary masses or adenopathy and noted some hardness in the left lower breast without any discrete mass.  CV: regular rate and rhythm, normal S1 S2, no S3 or S4, no murmur, click or rub, no peripheral edema and peripheral pulses strong  ABDOMEN: soft, nontender, no hepatosplenomegaly, no masses and bowel sounds normal   (female): normal female external genitalia, normal urethral meatus, vaginal mucosa pink, moist, well rugated, and normal cervix/adnexa/uterus without masses or discharge  MS: no gross musculoskeletal defects noted, no edema  SKIN: no suspicious lesions or rashes  NEURO: Normal strength and tone, mentation intact and speech normal  PSYCH: mentation appears normal, affect normal/bright    Diagnostic Test Results:  Labs reviewed in Epic  Results for orders placed or performed in visit on 06/27/22 (from the past 24 hour(s))   CBC with platelets and differential    Narrative    The following orders were created for panel order CBC with platelets and differential.  Procedure                               Abnormality         Status                     ---------                               " -----------         ------                     CBC with platelets and d...[[id number]]                      Final result                 Please view results for these tests on the individual orders.   CBC with platelets and differential   Result Value Ref Range    WBC Count 4.9 4.0 - 11.0 10e3/uL    RBC Count 4.17 3.80 - 5.20 10e6/uL    Hemoglobin 13.3 11.7 - 15.7 g/dL    Hematocrit 40.2 35.0 - 47.0 %    MCV 96 78 - 100 fL    MCH 31.9 26.5 - 33.0 pg    MCHC 33.1 31.5 - 36.5 g/dL    RDW 12.2 10.0 - 15.0 %    Platelet Count 217 150 - 450 10e3/uL    % Neutrophils 62 %    % Lymphocytes 28 %    % Monocytes 7 %    % Eosinophils 3 %    % Basophils 0 %    % Immature Granulocytes 0 %    Absolute Neutrophils 3.0 1.6 - 8.3 10e3/uL    Absolute Lymphocytes 1.4 0.8 - 5.3 10e3/uL    Absolute Monocytes 0.4 0.0 - 1.3 10e3/uL    Absolute Eosinophils 0.1 0.0 - 0.7 10e3/uL    Absolute Basophils 0.0 0.0 - 0.2 10e3/uL    Absolute Immature Granulocytes 0.0 <=0.4 10e3/uL       Counseling Resources:  ATP IV Guidelines  Pooled Cohorts Equation Calculator  Breast Cancer Risk Calculator  BRCA-Related Cancer Risk Assessment: FHS-7 Tool  FRAX Risk Assessment  ICSI Preventive Guidelines  Dietary Guidelines for Americans, 2010  USDA's MyPlate  ASA Prophylaxis  Lung CA Screening    Emmanuel Peterson MD  [facility]

## 2022-06-30 LAB
BKR LAB AP GYN ADEQUACY: NORMAL
BKR LAB AP GYN INTERPRETATION: NORMAL
BKR LAB AP HPV REFLEX: NORMAL
BKR LAB AP PREVIOUS ABNORMAL: NORMAL
PATH REPORT.COMMENTS IMP SPEC: NORMAL
PATH REPORT.COMMENTS IMP SPEC: NORMAL
PATH REPORT.RELEVANT HX SPEC: NORMAL

## 2022-07-05 LAB
HUMAN PAPILLOMA VIRUS 16 DNA: NEGATIVE
HUMAN PAPILLOMA VIRUS 18 DNA: NEGATIVE
HUMAN PAPILLOMA VIRUS FINAL DIAGNOSIS: NORMAL
HUMAN PAPILLOMA VIRUS OTHER HR: NEGATIVE

## 2022-07-07 ENCOUNTER — TELEPHONE (OUTPATIENT)
Dept: FAMILY MEDICINE | Facility: CLINIC | Age: 37
End: 2022-07-07

## 2022-07-07 NOTE — TELEPHONE ENCOUNTER
Left message to call back for: Results  Information to relay to patient: LMTCB, Please see message below from karen

## 2022-07-07 NOTE — TELEPHONE ENCOUNTER
----- Message from Emmanuel Peterson MD sent at 7/6/2022 10:49 PM CDT -----  Agree for cotesting in 3 years time.    Emmanuel Peterson MD

## 2022-07-21 ENCOUNTER — ONCOLOGY VISIT (OUTPATIENT)
Dept: ONCOLOGY | Facility: CLINIC | Age: 37
End: 2022-07-21
Attending: INTERNAL MEDICINE
Payer: COMMERCIAL

## 2022-07-21 VITALS
BODY MASS INDEX: 24.98 KG/M2 | RESPIRATION RATE: 14 BRPM | HEART RATE: 60 BPM | DIASTOLIC BLOOD PRESSURE: 66 MMHG | WEIGHT: 141 LBS | TEMPERATURE: 98.6 F | OXYGEN SATURATION: 100 % | SYSTOLIC BLOOD PRESSURE: 100 MMHG

## 2022-07-21 DIAGNOSIS — C50.812 MALIGNANT NEOPLASM OF OVERLAPPING SITES OF LEFT BREAST IN FEMALE, ESTROGEN RECEPTOR POSITIVE (H): Primary | ICD-10-CM

## 2022-07-21 DIAGNOSIS — Z79.811 USE OF AROMATASE INHIBITORS: ICD-10-CM

## 2022-07-21 DIAGNOSIS — N95.1 MENOPAUSAL SYNDROME (HOT FLASHES): ICD-10-CM

## 2022-07-21 DIAGNOSIS — Z17.0 MALIGNANT NEOPLASM OF OVERLAPPING SITES OF LEFT BREAST IN FEMALE, ESTROGEN RECEPTOR POSITIVE (H): Primary | ICD-10-CM

## 2022-07-21 PROCEDURE — 99214 OFFICE O/P EST MOD 30 MIN: CPT | Mod: GC | Performed by: INTERNAL MEDICINE

## 2022-07-21 PROCEDURE — G0463 HOSPITAL OUTPT CLINIC VISIT: HCPCS

## 2022-07-21 ASSESSMENT — PAIN SCALES - GENERAL: PAINLEVEL: NO PAIN (0)

## 2022-07-21 NOTE — PROGRESS NOTES
July 21, 2022    Medical Oncology Follow-up    Reason for visit: left breast cancer     HPI:  This is a 37 y.o. Female (older than her stated age likely in her 50s based on immigration and change of age), with T2N1a left breast cancer, ER positive. This was picked up by the patient. She noticed an indentation or dimpling of her breast several months ago. She brought it up to her physician when she was seen because of neck and arm pain after a Covid vaccine. She underwent a mammogram and ultrasound. A needle biopsy was done which shows an invasive ductal carcinoma. It is estrogen receptor positive 95%, progesterone receptor positive weakly 3% and HER-2 negative 0 by IHC.  R breast revealed two fibroadenomas.  L breast - 11x13 mm mass in the 1 oclock position 9 cm from the nipple.  Lymph nodes appeared normal.       She met with Dr Kent.  She underwent a lumpectomy with SNLB.      Oncotype Dx score returned at 17 giving her a risk of metastatic disease of 15% at 10 years.  No benefit from chemotherapy. I recommended adjuvant radiation and adjuvant endocrine therapy. Lab work confirmed postmenopausal (she told me her age is not accurate in the computer).     Interval History:   She notes that her symptoms have improved significantly after switching from letrozole to anastrazole. However, she continues to have  hot flashes that vary from occurring every hour to once every few hours. Triggers include spicy foods, hot beverages, or going outside. She wakes up at night 1-2 times because of hot flashes and is tired in the morning as a result. The intensity of hot flashes has not been as prominent as when she was on letrozole. Her symptoms improve with cold water and fan. She also notesvaginal dryness; she is not sexually active and has no pain or irritation associated with dryness. She does have constipation and have adjusted her diet and is taking OTC stool softeners, fiber; she is having BMs about once every 2 days.  Feels bloated, but no abdominal pain.  She also noted tenderness to left lower breast when PCP palpated, but otherwise does not have any breast pain when note palpated. She has not noticed any lumps or bumps.    She had previously been working with lymphedema therapy, including working on exercises and using a lymphedema wrap for her arm. Lives at home with 2 teenagers. Working as an EEG technologist.     ROS: 10 point ROS neg other than the symptoms noted above in the HPI.      Past Medical History:   Diagnosis Date     Abnormal glandular Papanicolaou smear of cervix     confusing info related to pap     Breast cancer (H)      History of colonic polyps 2021     Invasive ductal carcinoma of breast, left (H) 2021     Pain in joint, lower leg      Previous  delivery, antepartum condition or complication 2005     Current Outpatient Medications   Medication     anastrozole (ARIMIDEX) 1 MG tablet     calcium carbonate 600 mg-vitamin D 400 units (CALTRATE) 600-400 MG-UNIT per tablet     omeprazole (PRILOSEC) 20 MG DR capsule     No current facility-administered medications for this visit.       PHYSICAL EXAM:  /66 (BP Location: Right arm, Patient Position: Sitting, Cuff Size: Adult Regular)   Pulse 60   Temp 98.6  F (37  C) (Oral)   Resp 14   Wt 64 kg (141 lb)   SpO2 100%   BMI 24.98 kg/m     Gen: Alert and oriented. No distress, pleasant  Lungs: CTAB, normal work of breathing on room air   Cards: RRR  GI: normal bowel sounds, nontender to palpation   Breast: Left lower breast more firm inferiorly than right, healed left lumpectomy incision, otherwise appears symmetrical   Neuro: No focal neural deficits, moving all extremities, ambulating independently   Psych: Euthymic, appropriate mood and affect    A/P:  38 y/o female with a pT2N1a left breast cancer s/p lumpectomy and SNLB.  The tumor is ER + essentially DE negative, her2 negative. She was previously on letrozole, but had  significant hot flashes and joint pain and was switched to anastrozole (1/2022).     Left breast cancer: S/p lumpectomy + SLNB s/p adjuvant radiation. Unfortunately she was having sigificant joint aches with the letrozole that was affecting her quality of life. She was switched to anastrozole, with overall significant improvement of symptoms.   -She continues to have hot flashes, which are bothersome. She generally does not want to take medications. Reports she never started taking duloxetine. We discussed gabapentin, but she is not currently interested. She would be willing to try medication for hot flashes if her symptoms worsen in the future.   -Repeat mammogram in 12/2022     Bone health: DEXA scan was reviewed showing osteopenia. Discussed calcium and vitamin D and weight bearing exercise. We discussed bisphosphonates as prophylaxis.  But she would like to continue to hold off for now     Lymphedema - She worked with lymphedema clinic and is using an arm wrap and doing exercises and those have helped a lot. Tenderness with her left lower breast was consistent with lymphedema. Connected pt with research coordinator to work on strategies to reduce lymphedema.     Arthralgias secondary to aromatase inhibitors ( AIMSS) - Improved recently after switching to anastrozole. Pt clarify that she never started on duloxetine.  Encouraged exercise.    Follow up in 3 months with ZANDER and in 6 months with Dr. Kenny.    Seen in conjunction with Dr. Zoya Edwards MD on 7/21/2022 at 5:46 PM  Hematology/Oncology Fellow PGY4    Addendum:  Pt was seen and evaluated by me with Dr Edwards.  I reviewed and edited the above to reflect my evaluation.  Cristy Kenny MD

## 2022-07-21 NOTE — LETTER
7/21/2022         RE: Xiao Beal  [address]  [place] [address]        Dear Colleague,    Thank you for referring your patient, Xiao Beal, to the [facility]. Please see a copy of my visit note below.    July 21, 2022    Medical Oncology Follow-up    Reason for visit: left breast cancer     HPI:  This is a 37 y.o. Female (older than her stated age likely in her 50s based on immigration and change of age), with T2N1a left breast cancer, ER positive. This was picked up by the patient. She noticed an indentation or dimpling of her breast several months ago. She brought it up to her physician when she was seen because of neck and arm pain after a Covid vaccine. She underwent a mammogram and ultrasound. A needle biopsy was done which shows an invasive ductal carcinoma. It is estrogen receptor positive 95%, progesterone receptor positive weakly 3% and HER-2 negative 0 by IHC.  R breast revealed two fibroadenomas.  L breast - 11x13 mm mass in the 1 oclock position 9 cm from the nipple.  Lymph nodes appeared normal.       She met with Dr Kent.  She underwent a lumpectomy with SNLB.      Oncotype Dx score returned at 17 giving her a risk of metastatic disease of 15% at 10 years.  No benefit from chemotherapy. I recommended adjuvant radiation and adjuvant endocrine therapy. Lab work confirmed postmenopausal (she told me her age is not accurate in the computer).     Interval History:   She notes that her symptoms have improved significantly after switching from letrozole to anastrazole. However, she continues to have  hot flashes that vary from occurring every hour to once every few hours. Triggers include spicy foods, hot beverages, or going outside. She wakes up at night 1-2 times because of hot flashes and is tired in the morning as a result. The intensity of hot flashes has not been as prominent as when she was on letrozole. Her symptoms improve with cold water and fan. She also  notesvaginal dryness; she is not sexually active and has no pain or irritation associated with dryness. She does have constipation and have adjusted her diet and is taking OTC stool softeners, fiber; she is having BMs about once every 2 days. Feels bloated, but no abdominal pain.  She also noted tenderness to left lower breast when PCP palpated, but otherwise does not have any breast pain when note palpated. She has not noticed any lumps or bumps.    She had previously been working with lymphedema therapy, including working on exercises and using a lymphedema wrap for her arm. Lives at home with 2 teenagers. Working as an EEG technologist.     ROS: 10 point ROS neg other than the symptoms noted above in the HPI.      Past Medical History:   Diagnosis Date     Abnormal glandular Papanicolaou smear of cervix     confusing info related to pap     Breast cancer (H)      History of colonic polyps 2021     Invasive ductal carcinoma of breast, left (H) 2021     Pain in joint, lower leg      Previous  delivery, antepartum condition or complication 2005     Current Outpatient Medications   Medication     anastrozole (ARIMIDEX) 1 MG tablet     calcium carbonate 600 mg-vitamin D 400 units (CALTRATE) 600-400 MG-UNIT per tablet     omeprazole (PRILOSEC) 20 MG DR capsule     No current facility-administered medications for this visit.       PHYSICAL EXAM:  /66 (BP Location: Right arm, Patient Position: Sitting, Cuff Size: Adult Regular)   Pulse 60   Temp 98.6  F (37  C) (Oral)   Resp 14   Wt 64 kg (141 lb)   SpO2 100%   BMI 24.98 kg/m     Gen: Alert and oriented. No distress, pleasant  Lungs: CTAB, normal work of breathing on room air   Cards: RRR  GI: normal bowel sounds, nontender to palpation   Breast: Left lower breast more firm inferiorly than right, healed left lumpectomy incision, otherwise appears symmetrical   Neuro: No focal neural deficits, moving all extremities, ambulating  independently   Psych: Euthymic, appropriate mood and affect    A/P:  38 y/o female with a pT2N1a left breast cancer s/p lumpectomy and SNLB.  The tumor is ER + essentially [place] negative, her2 negative. She was previously on letrozole, but had significant hot flashes and joint pain and was switched to anastrozole (1/2022).     Left breast cancer: S/p lumpectomy + SLNB s/p adjuvant radiation. Unfortunately she was having sigificant joint aches with the letrozole that was affecting her quality of life. She was switched to anastrozole, with overall significant improvement of symptoms.   -She continues to have hot flashes, which are bothersome. She generally does not want to take medications. Reports she never started taking duloxetine. We discussed gabapentin, but she is not currently interested. She would be willing to try medication for hot flashes if her symptoms worsen in the future.   -Repeat mammogram in 12/2022     Bone health: DEXA scan was reviewed showing osteopenia. Discussed calcium and vitamin D and weight bearing exercise. We discussed bisphosphonates as prophylaxis.  But she would like to continue to hold off for now     Lymphedema - She worked with lymphedema clinic and is using an arm wrap and doing exercises and those have helped a lot. Tenderness with her left lower breast was consistent with lymphedema. Connected pt with research coordinator to work on strategies to reduce lymphedema.     Arthralgias secondary to aromatase inhibitors ( AIMSS) - Improved recently after switching to anastrozole. Pt clarify that she never started on duloxetine.  Encouraged exercise.    Follow up in 3 months with ZANDER and in 6 months with Dr. Kenny.    Seen in conjunction with Dr. Zoya Edwards MD on 7/21/2022 at 5:46 PM  Hematology/Oncology Fellow PGY4    Addendum:  Pt was seen and evaluated by me with Dr Edwards.  I reviewed and edited the above to reflect my evaluation.  Cristy Kenny MD

## 2022-07-21 NOTE — NURSING NOTE
"Oncology Rooming Note    July 21, 2022 10:06 AM   Xiao Beal is a 37 year old female who presents for:    Chief Complaint   Patient presents with     Oncology Clinic Visit     Rtn for breast cancer     Initial Vitals: /66   Pulse 60   Temp 98.6  F (37  C) (Oral)   Wt 64 kg (141 lb)   SpO2 100%   BMI 24.98 kg/m   Estimated body mass index is 24.98 kg/m  as calculated from the following:    Height as of 6/27/22: 1.6 m (5' 3\").    Weight as of this encounter: 64 kg (141 lb). Body surface area is 1.69 meters squared.  No Pain (0) Comment: Data Unavailable   No LMP recorded. Patient is perimenopausal.  Allergies reviewed: Yes  Medications reviewed: Yes    Medications: Medication refills not needed today.  Pharmacy name entered into [facility]:    [facility] [place], [place] - [address]  [facility][id number] - [place], [place] - [address] [facility]  AT [facility] [address]  [id number] - [place] - [address] AT [facility] [address]  [facility] #[id number] [place], [place] - [address] [facility] [address]    Clinical concerns: none       Lisa Romero, EMT            "

## 2022-11-21 ENCOUNTER — HEALTH MAINTENANCE LETTER (OUTPATIENT)
Age: 37
End: 2022-11-21

## 2023-01-05 ENCOUNTER — ANCILLARY PROCEDURE (OUTPATIENT)
Dept: MAMMOGRAPHY | Facility: CLINIC | Age: 38
End: 2023-01-05
Attending: INTERNAL MEDICINE
Payer: COMMERCIAL

## 2023-01-05 ENCOUNTER — ONCOLOGY VISIT (OUTPATIENT)
Dept: ONCOLOGY | Facility: CLINIC | Age: 38
End: 2023-01-05
Attending: INTERNAL MEDICINE
Payer: COMMERCIAL

## 2023-01-05 VITALS
DIASTOLIC BLOOD PRESSURE: 80 MMHG | OXYGEN SATURATION: 98 % | SYSTOLIC BLOOD PRESSURE: 134 MMHG | WEIGHT: 141.2 LBS | HEART RATE: 73 BPM | TEMPERATURE: 99 F | BODY MASS INDEX: 25.01 KG/M2

## 2023-01-05 DIAGNOSIS — Z17.0 MALIGNANT NEOPLASM OF OVERLAPPING SITES OF LEFT BREAST IN FEMALE, ESTROGEN RECEPTOR POSITIVE (H): Primary | ICD-10-CM

## 2023-01-05 DIAGNOSIS — K21.00 GASTROESOPHAGEAL REFLUX DISEASE WITH ESOPHAGITIS, UNSPECIFIED WHETHER HEMORRHAGE: ICD-10-CM

## 2023-01-05 DIAGNOSIS — C50.812 MALIGNANT NEOPLASM OF OVERLAPPING SITES OF LEFT BREAST IN FEMALE, ESTROGEN RECEPTOR POSITIVE (H): Primary | ICD-10-CM

## 2023-01-05 DIAGNOSIS — C50.812 MALIGNANT NEOPLASM OF OVERLAPPING SITES OF LEFT BREAST IN FEMALE, ESTROGEN RECEPTOR POSITIVE (H): ICD-10-CM

## 2023-01-05 DIAGNOSIS — Z17.0 MALIGNANT NEOPLASM OF OVERLAPPING SITES OF LEFT BREAST IN FEMALE, ESTROGEN RECEPTOR POSITIVE (H): ICD-10-CM

## 2023-01-05 PROCEDURE — G0463 HOSPITAL OUTPT CLINIC VISIT: HCPCS

## 2023-01-05 PROCEDURE — 77067 SCR MAMMO BI INCL CAD: CPT | Mod: GC | Performed by: STUDENT IN AN ORGANIZED HEALTH CARE EDUCATION/TRAINING PROGRAM

## 2023-01-05 PROCEDURE — 99214 OFFICE O/P EST MOD 30 MIN: CPT | Performed by: INTERNAL MEDICINE

## 2023-01-05 PROCEDURE — 77063 BREAST TOMOSYNTHESIS BI: CPT | Mod: GC | Performed by: STUDENT IN AN ORGANIZED HEALTH CARE EDUCATION/TRAINING PROGRAM

## 2023-01-05 PROCEDURE — G0463 HOSPITAL OUTPT CLINIC VISIT: HCPCS | Performed by: INTERNAL MEDICINE

## 2023-01-05 RX ORDER — TAMOXIFEN CITRATE 20 MG/1
20 TABLET ORAL DAILY
Qty: 90 TABLET | Refills: 3 | Status: SHIPPED | OUTPATIENT
Start: 2023-01-05 | End: 2024-01-29

## 2023-01-05 ASSESSMENT — PAIN SCALES - GENERAL: PAINLEVEL: NO PAIN (0)

## 2023-01-05 NOTE — PROGRESS NOTES
January 5, 2023    Medical Oncology Follow-up    Reason for visit: left breast cancer     HPI:  This is a 37 y.o. Female (older than her stated age likely in her 50s based on immigration and change of age), with T2N1a left breast cancer, ER positive. This was picked up by the patient. She noticed an indentation or dimpling of her breast several months ago. She brought it up to her physician when she was seen because of neck and arm pain after a Covid vaccine. She underwent a mammogram and ultrasound. A needle biopsy was done which shows an invasive ductal carcinoma. It is estrogen receptor positive 95%, progesterone receptor positive weakly 3% and HER-2 negative 0 by IHC.  R breast revealed two fibroadenomas.  L breast - 11x13 mm mass in the 1 oclock position 9 cm from the nipple.  Lymph nodes appeared normal.       She met with Dr Kent.  She underwent a lumpectomy with SNLB.      Oncotype Dx score returned at 17 giving her a risk of metastatic disease of 15% at 9 years.  No benefit from chemotherapy. I recommended adjuvant radiation and adjuvant endocrine therapy. Lab work confirmed postmenopausal (she told me her age is not accurate in the computer).     She received 5040 cGy in 28 treatments targeted to the left breast/chest wall and regional lymph nodes followed by an additional 1000 cGy in 5 fractions to the primary tumor bed utilizing electrons. Her radiation therapy was from 4/12/2021-6/2/2021 under the care of Dr. Iza Espinoza at [facility].     Interval History:   She notes that initially her symptoms of arthralgias have improved significantly after switching from letrozole to anastrazole. However, she continues to have difficulty with overall stiffness, and arthralgias.  She is wondering if there are any other options.      She continues to have intermittent hot flashes that vary from occurring every hour to once every few hours.  She does have constipation and have adjusted her diet      She had previously  been working with lymphedema therapy, including working on exercises and using a lymphedema wrap for her arm. 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      ROS: 10 point ROS neg other than the symptoms noted above in the HPI.      Past Medical History:   Diagnosis Date     Abnormal glandular Papanicolaou smear of cervix     confusing info related to pap     Breast cancer (H)      History of colonic polyps 2021     Invasive ductal carcinoma of breast, left (H) 2021     Pain in joint, lower leg      Previous  delivery, antepartum condition or complication 2005     Current Outpatient Medications   Medication     anastrozole (ARIMIDEX) 1 MG tablet     calcium carbonate 600 mg-vitamin D 400 units (CALTRATE) 600-400 MG-UNIT per tablet     omeprazole (PRILOSEC) 20 MG DR capsule     No current facility-administered medications for this visit.       PHYSICAL EXAM:  /80   Pulse 73   Temp 99  F (37.2  C) (Oral)   Wt 64 kg (141 lb 3.2 oz)   SpO2 98%   BMI 25.01 kg/m     Gen: Alert and oriented. No distress, pleasant  Lungs: CTAB, normal work of breathing on room air   Cards: RRR  GI: normal bowel sounds, nontender to palpation   Breast: Left lower breast more firm inferiorly than right, healed left lumpectomy incision, otherwise appears symmetrical   Neuro: No focal neural deficits, moving all extremities, ambulating independently   Psych: Euthymic, appropriate mood and affect    I reviewed her imaging personally with radiology demonstrating no masses or lesions.    A/P:  38 y/o female with a pT2N1a left breast cancer s/p lumpectomy and SNLB.  The tumor is ER + essentially [place] negative, her2 negative. She was previously on letrozole, but had significant hot flashes and joint pain and was switched to anastrozole (2022).     Left breast cancer: S/p lumpectomy + SLNB s/p adjuvant radiation.  Unfortunately she was having sigificant joint aches with the letrozole that was affecting her quality of life. She was switched to anastrozole, with continuation of these symptoms.    We discussed the possibilities of taking a break from AI vs considering tamoxifen.  We discussed the side effects of tamoxifen.  After discussion, I would like her to try tamoxifen.  Script sent out.       Return in 3 months for symptom check in with ZANDER and with me with 6 months.    -Repeat mammogram in 12/2023    Bone health: DEXA scan was reviewed showing osteopenia. Discussed calcium and vitamin D and weight bearing exercise. We discussed bisphosphonates as prophylaxis.  Encouraged exercise.       Arthralgias secondary to aromatase inhibitors ( AIMSS) - as above.     GERD - omeprazole renewed.    Cristy Kenny MD

## 2023-01-05 NOTE — LETTER
1/5/2023         RE: Xiao Beal  [address]  [place] [address]        Dear Colleague,    Thank you for referring your patient, Xiao Beal, to the [facility]. Please see a copy of my visit note below.    January 5, 2023    Medical Oncology Follow-up    Reason for visit: left breast cancer     HPI:  This is a 37 y.o. Female (older than her stated age likely in her 50s based on immigration and change of age), with T2N1a left breast cancer, ER positive. This was picked up by the patient. She noticed an indentation or dimpling of her breast several months ago. She brought it up to her physician when she was seen because of neck and arm pain after a Covid vaccine. She underwent a mammogram and ultrasound. A needle biopsy was done which shows an invasive ductal carcinoma. It is estrogen receptor positive 95%, progesterone receptor positive weakly 3% and HER-2 negative 0 by IHC.  R breast revealed two fibroadenomas.  L breast - 11x13 mm mass in the 1 oclock position 9 cm from the nipple.  Lymph nodes appeared normal.       She met with Dr Kent.  She underwent a lumpectomy with SNLB.      Oncotype Dx score returned at 17 giving her a risk of metastatic disease of 15% at 9 years.  No benefit from chemotherapy. I recommended adjuvant radiation and adjuvant endocrine therapy. Lab work confirmed postmenopausal (she told me her age is not accurate in the computer).     She received 5040 cGy in 28 treatments targeted to the left breast/chest wall and regional lymph nodes followed by an additional 1000 cGy in 5 fractions to the primary tumor bed utilizing electrons. Her radiation therapy was from 4/12/2021-6/2/2021 under the care of Dr. Iza Espinoza at [facility].     Interval History:   She notes that initially her symptoms of arthralgias have improved significantly after switching from letrozole to anastrazole. However, she continues to have difficulty with overall stiffness, and  arthralgias.  She is wondering if there are any other options.      She continues to have intermittent hot flashes that vary from occurring every hour to once every few hours.  She does have constipation and have adjusted her diet      She had previously been working with lymphedema therapy, including working on exercises and using a lymphedema wrap for her arm. 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      ROS: 10 point ROS neg other than the symptoms noted above in the HPI.      Past Medical History:   Diagnosis Date     Abnormal glandular Papanicolaou smear of cervix     confusing info related to pap     Breast cancer (H)      History of colonic polyps 2021     Invasive ductal carcinoma of breast, left (H) 2021     Pain in joint, lower leg      Previous  delivery, antepartum condition or complication 2005     Current Outpatient Medications   Medication     anastrozole (ARIMIDEX) 1 MG tablet     calcium carbonate 600 mg-vitamin D 400 units (CALTRATE) 600-400 MG-UNIT per tablet     omeprazole (PRILOSEC) 20 MG DR capsule     No current facility-administered medications for this visit.       PHYSICAL EXAM:  /80   Pulse 73   Temp 99  F (37.2  C) (Oral)   Wt 64 kg (141 lb 3.2 oz)   SpO2 98%   BMI 25.01 kg/m     Gen: Alert and oriented. No distress, pleasant  Lungs: CTAB, normal work of breathing on room air   Cards: RRR  GI: normal bowel sounds, nontender to palpation   Breast: Left lower breast more firm inferiorly than right, healed left lumpectomy incision, otherwise appears symmetrical   Neuro: No focal neural deficits, moving all extremities, ambulating independently   Psych: Euthymic, appropriate mood and affect    I reviewed her imaging personally with radiology demonstrating no masses or lesions.    A/P:  38 y/o female with a pT2N1a left breast cancer s/p lumpectomy and  SNLB.  The tumor is ER + essentially [place] negative, her2 negative. She was previously on letrozole, but had significant hot flashes and joint pain and was switched to anastrozole (1/2022).     Left breast cancer: S/p lumpectomy + SLNB s/p adjuvant radiation. Unfortunately she was having sigificant joint aches with the letrozole that was affecting her quality of life. She was switched to anastrozole, with continuation of these symptoms.    We discussed the possibilities of taking a break from AI vs considering tamoxifen.  We discussed the side effects of tamoxifen.  After discussion, I would like her to try tamoxifen.  Script sent out.       Return in 3 months for symptom check in with ZANDER and with me with 6 months.    -Repeat mammogram in 12/2023    Bone health: DEXA scan was reviewed showing osteopenia. Discussed calcium and vitamin D and weight bearing exercise. We discussed bisphosphonates as prophylaxis.  Encouraged exercise.       Arthralgias secondary to aromatase inhibitors ( AIMSS) - as above.     GERD - omeprazole renewed.    Cristy Kenny MD

## 2023-01-05 NOTE — NURSING NOTE
"Oncology Rooming Note    January 5, 2023 10:53 AM   Xiao Beal is a 37 year old female who presents for:    Chief Complaint   Patient presents with     Oncology Clinic Visit     Malignant neoplasm of overlapping sites of left breast in female, estrogen receptor positive     Initial Vitals: /80   Pulse 73   Temp 99  F (37.2  C) (Oral)   Wt 64 kg (141 lb 3.2 oz)   SpO2 98%   BMI 25.01 kg/m   Estimated body mass index is 25.01 kg/m  as calculated from the following:    Height as of 6/27/22: 1.6 m (5' 3\").    Weight as of this encounter: 64 kg (141 lb 3.2 oz). Body surface area is 1.69 meters squared.  No Pain (0) Comment: Data Unavailable   No LMP recorded. Patient is perimenopausal.  Allergies reviewed: Yes  Medications reviewed: Yes    Medications: Medication refills not needed today.  Pharmacy name entered into [facility]:    [facility] [place], [place] - [address]  [id number] - [place], [place] - [address] [facility]  AT [facility] [address]  [id number] - [place] - [address] AT [facility] [address]  [id number] [place] - [address] AT [facility] [address]    Clinical concerns: none       Susan Patel"

## 2023-04-11 ENCOUNTER — VIRTUAL VISIT (OUTPATIENT)
Dept: ONCOLOGY | Facility: CLINIC | Age: 38
End: 2023-04-11
Attending: PHYSICIAN ASSISTANT
Payer: COMMERCIAL

## 2023-04-11 DIAGNOSIS — N95.1 MENOPAUSAL SYNDROME (HOT FLASHES): Primary | ICD-10-CM

## 2023-04-11 DIAGNOSIS — C50.812 MALIGNANT NEOPLASM OF OVERLAPPING SITES OF LEFT BREAST IN FEMALE, ESTROGEN RECEPTOR POSITIVE (H): ICD-10-CM

## 2023-04-11 DIAGNOSIS — Z17.0 MALIGNANT NEOPLASM OF OVERLAPPING SITES OF LEFT BREAST IN FEMALE, ESTROGEN RECEPTOR POSITIVE (H): ICD-10-CM

## 2023-04-11 PROCEDURE — 99214 OFFICE O/P EST MOD 30 MIN: CPT | Mod: VID | Performed by: PHYSICIAN ASSISTANT

## 2023-04-11 RX ORDER — VENLAFAXINE HYDROCHLORIDE 37.5 MG/1
37.5 CAPSULE, EXTENDED RELEASE ORAL DAILY
Qty: 30 CAPSULE | Refills: 3 | Status: SHIPPED | OUTPATIENT
Start: 2023-04-11 | End: 2024-01-29

## 2023-04-11 NOTE — NURSING NOTE
Is the patient currently in the state of [place]? YES    Visit mode:VIDEO    If the visit is dropped, the patient can be reconnected by: VIDEO VISIT: Text to cell phone: [phone]    Will anyone else be joining the visit? NO      How would you like to obtain your AVS? MyChart    Are changes needed to the allergy or medication list? YES: Pt states she is also taking Magnesium     Reason for visit: Return Visit    Meryl MCRAE

## 2023-04-11 NOTE — LETTER
4/11/2023         RE: Xiao Beal  [address] [place] [address]        Dear Colleague,    Thank you for referring your patient, Xiao Beal, to the [facility]. Please see a copy of my visit note below.    Virtual Visit Details    Type of service:  Video Visit     Originating Location (pt. Location):HOME    Distant Location (provider location): OFF SITE  Platform used for Video Visit: ALLIE    Apr 11, 2023      Medical Oncology Follow-up    Reason for visit: left breast cancer     HPI:  This is a 37 y.o. Female (older than her stated age likely in her 50s based on immigration and change of age), with T2N1a left breast cancer, ER positive. This was picked up by the patient. She noticed an indentation or dimpling of her breast several months ago. She brought it up to her physician when she was seen because of neck and arm pain after a Covid vaccine. She underwent a mammogram and ultrasound. A needle biopsy was done which shows an invasive ductal carcinoma. It is estrogen receptor positive 95%, progesterone receptor positive weakly 3% and HER-2 negative 0 by IHC.  R breast revealed two fibroadenomas.  L breast - 11x13 mm mass in the 1 oclock position 9 cm from the nipple.  Lymph nodes appeared normal.       She met with Dr Kent.  She underwent a lumpectomy with SNLB.      Oncotype Dx score returned at 17 giving her a risk of metastatic disease of 15% at 10 years.  No benefit from chemotherapy. Dr Kenny recommended adjuvant radiation and adjuvant endocrine therapy. Lab work confirmed postmenopausal (she told me her age is not accurate in the computer).     She received 5040 cGy in 28 treatments targeted to the left breast/chest wall and regional lymph nodes followed by an additional 1000 cGy in 5 fractions to the primary tumor bed utilizing electrons. Her radiation therapy was from 4/12/2021-6/2/2021 under the care of Dr. Iza Espinoza at [facility].     Interval History:    Letrozole and anastozole caused terrible arthralgias, she could hardly walk up the stairs at work.  She now has transitioned to tamoxifen and has been taking it for 3 full months.    + Hot flashes, fatigue and irritated mood with the tamoxifen.    She does not however have any joint issues with this medication.    She had previously been working with lymphedema therapy, including working on exercises and using a lymphedema wrap for her arm.  Has stable burning on thorax and tightness throughout breast.  She is unsure if exercise or lymphedema made a difference.     Lives at home with 2 teenagers. 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    ROS: 10 point ROS neg other than the symptoms noted above in the HPI.      Past Medical History:   Diagnosis Date    Abnormal glandular Papanicolaou smear of cervix     confusing info related to pap    Breast cancer (H)     History of colonic polyps 2021    Invasive ductal carcinoma of breast, left (H) 2021    Pain in joint, lower leg     Previous  delivery, antepartum condition or complication 2005     Current Outpatient Medications   Medication    calcium carbonate 600 mg-vitamin D 400 units (CALTRATE) 600-400 MG-UNIT per tablet    omeprazole (PRILOSEC) 20 MG DR capsule    tamoxifen (NOLVADEX) 20 MG tablet     No current facility-administered medications for this visit.       PHYSICAL EXAM:  There were no vitals taken for this visit.   Gen: Alert and oriented. No distress, pleasant  Lungs: CTAB, normal work of breathing on room air   Cards: RRR  GI: normal bowel sounds, nontender to palpation   Breast: Left lower breast more firm inferiorly than right, healed left lumpectomy incision, otherwise appears symmetrical   Neuro: No focal neural deficits, moving all extremities, ambulating independently   Psych: Euthymic, appropriate mood and affect    I reviewed her imaging personally with radiology demonstrating no masses or lesions.    A/P:  37 (not  her current age) y/o female with a pT2N1a left breast cancer s/p lumpectomy and SNLB.  The tumor is ER + essentially [place] negative, her2 negative. She was previously on letrozole, but had significant hot flashes and joint pain and was switched to anastrozole (1/2022) and then switched to tamoxifen in January 2023.     Left breast cancer: S/p lumpectomy + SLNB s/p adjuvant radiation. Unfortunately she was having sigificant joint aches with the AI's that was affecting her quality of life. She was switched to anastrozole, with continuation of these symptoms.    She has been tolerating the tamoxifen with ongoing fatigue, hot flashes and irritability.  She is frustrated and wondering if there are other medications out there that would be more tolerable.  We did briefly review the different endocrine options today.  After hearing her side effects and discussing them, I did suggest a trial of Effexor.  We discussed this would be helpful for her hot flashes it may help with her mood and with low energy levels.  I started her on 37.5 mg but suggested titrating up to 75 mg after a couple weeks of tolerability.  She will send a MyChart in through our system if she is having any concerns about this.  -Continue every 3-month follow-up  -Next mammogram 1/2024    Bone health: DEXA scan was reviewed showing osteopenia. Discussed calcium and vitamin D and weight bearing exercise. We discussed bisphosphonates as prophylaxis.  Encouraged exercise.     Ongoing left shoulder discomfort and lymphedema.  She is doing daily exercises and completed her lymphedema therapy.      Carmen Lenz PA-C    35 minutes spent on the date of the encounter doing chart review, lab review, imaging review, patient visit, documentation

## 2023-04-11 NOTE — PROGRESS NOTES
Virtual Visit Details    Type of service:  Video Visit     Originating Location (pt. Location):HOME    Distant Location (provider location): OFF SITE  Platform used for Video Visit: ALYCEWELL    Apr 11, 2023      Medical Oncology Follow-up    Reason for visit: left breast cancer     HPI:  This is a 37 y.o. Female (older than her stated age likely in her 50s based on immigration and change of age), with T2N1a left breast cancer, ER positive. This was picked up by the patient. She noticed an indentation or dimpling of her breast several months ago. She brought it up to her physician when she was seen because of neck and arm pain after a Covid vaccine. She underwent a mammogram and ultrasound. A needle biopsy was done which shows an invasive ductal carcinoma. It is estrogen receptor positive 95%, progesterone receptor positive weakly 3% and HER-2 negative 0 by IHC.  R breast revealed two fibroadenomas.  L breast - 11x13 mm mass in the 1 oclock position 9 cm from the nipple.  Lymph nodes appeared normal.       She met with Dr Kent.  She underwent a lumpectomy with SNLB.      Oncotype Dx score returned at 17 giving her a risk of metastatic disease of 15% at 9 years.  No benefit from chemotherapy. Dr Kenny recommended adjuvant radiation and adjuvant endocrine therapy. Lab work confirmed postmenopausal (she told me her age is not accurate in the computer).     She received 5040 cGy in 28 treatments targeted to the left breast/chest wall and regional lymph nodes followed by an additional 1000 cGy in 5 fractions to the primary tumor bed utilizing electrons. Her radiation therapy was from 4/12/2021-6/2/2021 under the care of Dr. Iza Espinoza at [facility].     Interval History:   Letrozole and anastozole caused terrible arthralgias, she could hardly walk up the stairs at work.  She now has transitioned to tamoxifen and has been taking it for 3 full months.    + Hot flashes, fatigue and irritated mood with the tamoxifen.    She  does not however have any joint issues with this medication.    She had previously been working with lymphedema therapy, including working on exercises and using a lymphedema wrap for her arm.  Has stable burning on thorax and tightness throughout breast.  She is unsure if exercise or lymphedema made a difference.     Lives at home with 2 teenagers. 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    ROS: 10 point ROS neg other than the symptoms noted above in the HPI.      Past Medical History:   Diagnosis Date     Abnormal glandular Papanicolaou smear of cervix     confusing info related to pap     Breast cancer (H)      History of colonic polyps 2021     Invasive ductal carcinoma of breast, left (H) 2021     Pain in joint, lower leg      Previous  delivery, antepartum condition or complication 2005     Current Outpatient Medications   Medication     calcium carbonate 600 mg-vitamin D 400 units (CALTRATE) 600-400 MG-UNIT per tablet     omeprazole (PRILOSEC) 20 MG DR capsule     tamoxifen (NOLVADEX) 20 MG tablet     No current facility-administered medications for this visit.       PHYSICAL EXAM:  There were no vitals taken for this visit.   Gen: Alert and oriented. No distress, pleasant  Lungs: CTAB, normal work of breathing on room air   Cards: RRR  GI: normal bowel sounds, nontender to palpation   Breast: Left lower breast more firm inferiorly than right, healed left lumpectomy incision, otherwise appears symmetrical   Neuro: No focal neural deficits, moving all extremities, ambulating independently   Psych: Euthymic, appropriate mood and affect    I reviewed her imaging personally with radiology demonstrating no masses or lesions.    A/P:  37 (not her current age) y/o female with a pT2N1a left breast cancer s/p lumpectomy and SNLB.  The tumor is ER + essentially [place] negative, her2 negative. She was previously on letrozole, but had significant hot flashes and joint pain and was switched to  anastrozole (1/2022) and then switched to tamoxifen in January 2023.     Left breast cancer: S/p lumpectomy + SLNB s/p adjuvant radiation. Unfortunately she was having sigificant joint aches with the AI's that was affecting her quality of life. She was switched to anastrozole, with continuation of these symptoms.    She has been tolerating the tamoxifen with ongoing fatigue, hot flashes and irritability.  She is frustrated and wondering if there are other medications out there that would be more tolerable.  We did briefly review the different endocrine options today.  After hearing her side effects and discussing them, I did suggest a trial of Effexor.  We discussed this would be helpful for her hot flashes it may help with her mood and with low energy levels.  I started her on 37.5 mg but suggested titrating up to 75 mg after a couple weeks of tolerability.  She will send a MyChart in through our system if she is having any concerns about this.  -Continue every 3-month follow-up  -Next mammogram 1/2024    Bone health: DEXA scan was reviewed showing osteopenia. Discussed calcium and vitamin D and weight bearing exercise. We discussed bisphosphonates as prophylaxis.  Encouraged exercise.     Ongoing left shoulder discomfort and lymphedema.  She is doing daily exercises and completed her lymphedema therapy.      Carmen Lenz PA-C    35 minutes spent on the date of the encounter doing chart review, lab review, imaging review, patient visit, documentation

## 2023-05-30 ENCOUNTER — PATIENT OUTREACH (OUTPATIENT)
Dept: CARE COORDINATION | Facility: CLINIC | Age: 38
End: 2023-05-30
Payer: COMMERCIAL

## 2023-06-13 ENCOUNTER — PATIENT OUTREACH (OUTPATIENT)
Dept: CARE COORDINATION | Facility: CLINIC | Age: 38
End: 2023-06-13
Payer: COMMERCIAL

## 2023-09-16 ENCOUNTER — HEALTH MAINTENANCE LETTER (OUTPATIENT)
Age: 38
End: 2023-09-16

## 2023-10-26 ENCOUNTER — ONCOLOGY VISIT (OUTPATIENT)
Dept: ONCOLOGY | Facility: CLINIC | Age: 38
End: 2023-10-26
Attending: INTERNAL MEDICINE
Payer: COMMERCIAL

## 2023-10-26 VITALS
SYSTOLIC BLOOD PRESSURE: 100 MMHG | TEMPERATURE: 98.9 F | OXYGEN SATURATION: 100 % | WEIGHT: 137.4 LBS | RESPIRATION RATE: 16 BRPM | HEART RATE: 66 BPM | HEIGHT: 63 IN | BODY MASS INDEX: 24.34 KG/M2 | DIASTOLIC BLOOD PRESSURE: 56 MMHG

## 2023-10-26 DIAGNOSIS — Z17.0 MALIGNANT NEOPLASM OF OVERLAPPING SITES OF LEFT BREAST IN FEMALE, ESTROGEN RECEPTOR POSITIVE (H): Primary | ICD-10-CM

## 2023-10-26 DIAGNOSIS — C50.812 MALIGNANT NEOPLASM OF OVERLAPPING SITES OF LEFT BREAST IN FEMALE, ESTROGEN RECEPTOR POSITIVE (H): Primary | ICD-10-CM

## 2023-10-26 PROCEDURE — 99214 OFFICE O/P EST MOD 30 MIN: CPT | Performed by: INTERNAL MEDICINE

## 2023-10-26 PROCEDURE — 99213 OFFICE O/P EST LOW 20 MIN: CPT | Performed by: INTERNAL MEDICINE

## 2023-10-26 ASSESSMENT — PAIN SCALES - GENERAL: PAINLEVEL: NO PAIN (0)

## 2023-10-26 NOTE — LETTER
10/26/2023         RE: Xiao Beal  [address] [place] [address]        Dear Colleague,    Thank you for referring your patient, Xiao Beal, to the [facility]. Please see a copy of my visit note below.    October 26, 2023    Medical Oncology Follow-up    Reason for visit: left breast cancer     HPI:  This is a 38 y.o. Female (older than her stated age likely in her 50s based on immigration and change of age), with T2N1a left breast cancer, ER positive. This was picked up by the patient. She noticed an indentation or dimpling of her breast several months ago. She brought it up to her physician when she was seen because of neck and arm pain after a Covid vaccine. She underwent a mammogram and ultrasound. A needle biopsy was done which shows an invasive ductal carcinoma. It is estrogen receptor positive 95%, progesterone receptor positive weakly 3% and HER-2 negative 0 by IHC.  R breast revealed two fibroadenomas.  L breast - 11x13 mm mass in the 1 oclock position 9 cm from the nipple.  Lymph nodes appeared normal.       She met with Dr Kent.  She underwent a lumpectomy with SNLB.      Oncotype Dx score returned at 17 giving her a risk of metastatic disease of 15% at 10 years.  No benefit from chemotherapy. I recommended adjuvant radiation and adjuvant endocrine therapy. Lab work confirmed postmenopausal (she told me her age is not accurate in the computer).     She received 5040 cGy in 28 treatments targeted to the left breast/chest wall and regional lymph nodes followed by an additional 1000 cGy in 5 fractions to the primary tumor bed utilizing electrons. Her radiation therapy was from 4/12/2021-6/2/2021 under the care of Dr. Iza Espinoza at [facility].     Interval History:   She notes that initially her symptoms of arthralgias have improved significantly after switching from letrozole to anastrazole. Earlier in 2023, she was switched to tamoxifen.       She is taking the  "tamoxifen.  She does not like it but thinks it is the best of the medications she has tried.  No vaginal bleeding.  Hot flashes continue to be her most challenging side effect.  SHe doesn't want to take anything else for it.  She remains on venlafaxine.      She had previously been working with lymphedema therapy, including working on exercises and using a lymphedema wrap for her arm. Lives at home with 2 teenagers. 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    ROS: 10 point ROS neg other than the symptoms noted above in the HPI.      Past Medical History:   Diagnosis Date    Abnormal glandular Papanicolaou smear of cervix     confusing info related to pap    Breast cancer (H)     History of colonic polyps 2021    Invasive ductal carcinoma of breast, left (H) 2021    Pain in joint, lower leg     Previous  delivery, antepartum condition or complication 2005     Current Outpatient Medications   Medication    calcium carbonate 600 mg-vitamin D 400 units (CALTRATE) 600-400 MG-UNIT per tablet    omeprazole (PRILOSEC) 20 MG DR capsule    tamoxifen (NOLVADEX) 20 MG tablet    venlafaxine (EFFEXOR XR) 37.5 MG 24 hr capsule     No current facility-administered medications for this visit.       PHYSICAL EXAM:  /56 (BP Location: Right arm, Patient Position: Sitting, Cuff Size: Adult Regular)   Pulse 66   Temp 98.9  F (37.2  C) (Oral)   Resp 16   Ht 1.61 m (5' 3.39\")   Wt 62.3 kg (137 lb 6.4 oz)   SpO2 100%   BMI 24.04 kg/m     Gen: Alert and oriented. No distress, pleasant  Lungs: CTAB, normal work of breathing on room air   Cards: RRR  GI: normal bowel sounds, nontender to palpation   Breast: Left lower breast more firm inferiorly than right, healed left lumpectomy incision, otherwise appears symmetrical   Neuro: No focal neural deficits, moving all extremities, ambulating independently   Psych: Euthymic, appropriate mood and affect    I " reviewed her imaging personally with radiology demonstrating no masses or lesions.    A/P:  37y/o female with a pT2N1a left breast cancer s/p lumpectomy and SNLB.  The tumor is ER + essentially [place] negative, her2 negative. She was previously on letrozole, but had significant hot flashes and joint pain and was switched to anastrozole (1/2022).  She is now on tamoxifen (2023)    Left breast cancer: S/p lumpectomy + SLNB s/p adjuvant radiation. Unfortunately she was having sigificant joint aches with the letrozole that was affecting her quality of life. She was switched to anastrozole, with continuation of these symptoms.  She is now on tamoxifen.  We discussed continuing tamoxifen.     Return in 3 months for symptom check in with ZANDER and with me with 6 months.    -Repeat mammogram in January.    Bone health: DEXA scan was reviewed showing osteopenia. Discussed calcium and vitamin D and weight bearing exercise. We discussed bisphosphonates as prophylaxis.  Encouraged exercise.         GERD - omeprazole renewed.    Cristy Kenny MD

## 2023-10-26 NOTE — PROGRESS NOTES
October 26, 2023    Medical Oncology Follow-up    Reason for visit: left breast cancer     HPI:  This is a 38 y.o. Female (older than her stated age likely in her 50s based on immigration and change of age), with T2N1a left breast cancer, ER positive. This was picked up by the patient. She noticed an indentation or dimpling of her breast several months ago. She brought it up to her physician when she was seen because of neck and arm pain after a Covid vaccine. She underwent a mammogram and ultrasound. A needle biopsy was done which shows an invasive ductal carcinoma. It is estrogen receptor positive 95%, progesterone receptor positive weakly 3% and HER-2 negative 0 by IHC.  R breast revealed two fibroadenomas.  L breast - 11x13 mm mass in the 1 oclock position 9 cm from the nipple.  Lymph nodes appeared normal.       She met with Dr Kent.  She underwent a lumpectomy with SNLB.      Oncotype Dx score returned at 17 giving her a risk of metastatic disease of 15% at 9 years.  No benefit from chemotherapy. I recommended adjuvant radiation and adjuvant endocrine therapy. Lab work confirmed postmenopausal (she told me her age is not accurate in the computer).     She received 5040 cGy in 28 treatments targeted to the left breast/chest wall and regional lymph nodes followed by an additional 1000 cGy in 5 fractions to the primary tumor bed utilizing electrons. Her radiation therapy was from 4/12/2021-6/2/2021 under the care of Dr. Iza Espinoza at [facility].     Interval History:   She notes that initially her symptoms of arthralgias have improved significantly after switching from letrozole to anastrazole. Earlier in 2023, she was switched to tamoxifen.       She is taking the tamoxifen.  She does not like it but thinks it is the best of the medications she has tried.  No vaginal bleeding.  Hot flashes continue to be her most challenging side effect.  SHe doesn't want to take anything else for it.  She remains on  "venlafaxine.      She had previously been working with lymphedema therapy, including working on exercises and using a lymphedema wrap for her arm. 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    ROS: 10 point ROS neg other than the symptoms noted above in the HPI.      Past Medical History:   Diagnosis Date     Abnormal glandular Papanicolaou smear of cervix     confusing info related to pap     Breast cancer (H)      History of colonic polyps 2021     Invasive ductal carcinoma of breast, left (H) 2021     Pain in joint, lower leg      Previous  delivery, antepartum condition or complication 2005     Current Outpatient Medications   Medication     calcium carbonate 600 mg-vitamin D 400 units (CALTRATE) 600-400 MG-UNIT per tablet     omeprazole (PRILOSEC) 20 MG DR capsule     tamoxifen (NOLVADEX) 20 MG tablet     venlafaxine (EFFEXOR XR) 37.5 MG 24 hr capsule     No current facility-administered medications for this visit.       PHYSICAL EXAM:  /56 (BP Location: Right arm, Patient Position: Sitting, Cuff Size: Adult Regular)   Pulse 66   Temp 98.9  F (37.2  C) (Oral)   Resp 16   Ht 1.61 m (5' 3.39\")   Wt 62.3 kg (137 lb 6.4 oz)   SpO2 100%   BMI 24.04 kg/m     Gen: Alert and oriented. No distress, pleasant  Lungs: CTAB, normal work of breathing on room air   Cards: RRR  GI: normal bowel sounds, nontender to palpation   Breast: Left lower breast more firm inferiorly than right, healed left lumpectomy incision, otherwise appears symmetrical   Neuro: No focal neural deficits, moving all extremities, ambulating independently   Psych: Euthymic, appropriate mood and affect    I reviewed her imaging personally with radiology demonstrating no masses or lesions.    A/P:  37y/o female with a pT2N1a left breast cancer s/p lumpectomy and SNLB.  The tumor is ER + essentially [place] negative, her2 negative. She was " previously on letrozole, but had significant hot flashes and joint pain and was switched to anastrozole (1/2022).  She is now on tamoxifen (2023)    Left breast cancer: S/p lumpectomy + SLNB s/p adjuvant radiation. Unfortunately she was having sigificant joint aches with the letrozole that was affecting her quality of life. She was switched to anastrozole, with continuation of these symptoms.  She is now on tamoxifen.  We discussed continuing tamoxifen.     Return in 3 months for symptom check in with ZANDER and with me with 6 months.    -Repeat mammogram in January.    Bone health: DEXA scan was reviewed showing osteopenia. Discussed calcium and vitamin D and weight bearing exercise. We discussed bisphosphonates as prophylaxis.  Encouraged exercise.         GERD - omeprazole renewed.    Cristy Kenny MD

## 2023-10-26 NOTE — NURSING NOTE
"Oncology Rooming Note    October 26, 2023 12:11 PM   Xiao Beal is a 38 year old female who presents for:    No chief complaint on file.    Initial Vitals: /56 (BP Location: Right arm, Patient Position: Sitting, Cuff Size: Adult Regular)   Pulse 66   Temp 98.9  F (37.2  C) (Oral)   Resp 16   Ht 1.61 m (5' 3.39\")   Wt 62.3 kg (137 lb 6.4 oz)   SpO2 100%   BMI 24.04 kg/m   Estimated body mass index is 24.04 kg/m  as calculated from the following:    Height as of this encounter: 1.61 m (5' 3.39\").    Weight as of this encounter: 62.3 kg (137 lb 6.4 oz). Body surface area is 1.67 meters squared.  No Pain (0) Comment: Data Unavailable   No LMP recorded. Patient is perimenopausal.  Allergies reviewed: Yes  Medications reviewed: Yes    Medications: Medication refills not needed today.  Pharmacy name entered into [facility]:    [facility] [place], [place] - [address]  [id number] [place], [place] - [address]  AT [facility] [address]  [id number] [place] [address] [facility] [address]  [id number] [place] - [address] AT [facility] [address]    Clinical concerns: none       Alpa Logan              "

## 2023-11-21 ENCOUNTER — PATIENT OUTREACH (OUTPATIENT)
Dept: GASTROENTEROLOGY | Facility: CLINIC | Age: 38
End: 2023-11-21
Payer: COMMERCIAL

## 2023-11-28 ENCOUNTER — MYC MEDICAL ADVICE (OUTPATIENT)
Dept: ONCOLOGY | Facility: CLINIC | Age: 38
End: 2023-11-28
Payer: COMMERCIAL

## 2023-11-28 DIAGNOSIS — C50.812 MALIGNANT NEOPLASM OF OVERLAPPING SITES OF LEFT BREAST IN FEMALE, ESTROGEN RECEPTOR POSITIVE (H): Primary | ICD-10-CM

## 2023-11-28 DIAGNOSIS — Z17.0 MALIGNANT NEOPLASM OF OVERLAPPING SITES OF LEFT BREAST IN FEMALE, ESTROGEN RECEPTOR POSITIVE (H): Primary | ICD-10-CM

## 2023-11-28 RX ORDER — TAMOXIFEN CITRATE 10 MG/1
10 TABLET ORAL DAILY
Qty: 90 TABLET | Refills: 3 | Status: SHIPPED | OUTPATIENT
Start: 2023-11-28 | End: 2024-01-29

## 2023-11-28 NOTE — TELEPHONE ENCOUNTER
"Nurse Triage SBAR    Situation: Trouble sleeping, headache, hot flashes    Background:    DX: Breast Cancer  Provider:   Most recent appointment: 10/26/23 w/  Upcoming appointments: None scheduled  Most recent treatment: Tamoxifen      Assessment:   Symptoms are ongoing and feel like they are getting worse. There is an increase in frequency the last 3 weeks. Pt reports trouble sleeping, headache (constant), eyes feel heavy, fatigue and more hot flashes (every 45 min).     States she is still also experiencing some muscle/joint pain. That is still ongoing as well. \"Its terrible.\"    Taking Tylenol for headaches. Also taking Vitamin B12, multivitamin, fish oil, calcium, magnesium to help with sx.     Denies SOB, N/V, F/C    Pt wondering if she can switch over to another medication or anything else be recommended to help with sx.     Recommendation:    Secure message to     1623  responding It could be from tamoxifen. If she cannot tolerate it, then stop for two weeks and then start tamoxifen half pill daily.    1634  Call to pt to inform her of provider recommendation. Pt stating she would like to hold medication for two weeks and then restart half pill. She is wondering if new prescription can be sent in for half the dose so she does not have to cut tablet.   "

## 2023-11-28 NOTE — TELEPHONE ENCOUNTER
Called patient to follow up on symptoms sent through a [employer] message on 11/28/23. Left a voicemail message requesting the patient call [phone], option 5, option 2 and speak with any Triage nurse available.

## 2024-01-12 ENCOUNTER — TELEPHONE (OUTPATIENT)
Dept: FAMILY MEDICINE | Facility: CLINIC | Age: 39
End: 2024-01-12
Payer: COMMERCIAL

## 2024-01-12 NOTE — TELEPHONE ENCOUNTER
Incoming call from patient requesting that we send her a copy of her immunization record via [employer]. I tried to walk her through the process to do it herself but we decided it is easier to send a copy via [employer].    Reg Medina RN     [facility]

## 2024-01-22 ENCOUNTER — ANCILLARY PROCEDURE (OUTPATIENT)
Dept: MAMMOGRAPHY | Facility: CLINIC | Age: 39
End: 2024-01-22
Attending: INTERNAL MEDICINE
Payer: COMMERCIAL

## 2024-01-22 ENCOUNTER — ONCOLOGY VISIT (OUTPATIENT)
Dept: ONCOLOGY | Facility: CLINIC | Age: 39
End: 2024-01-22
Attending: INTERNAL MEDICINE
Payer: COMMERCIAL

## 2024-01-22 VITALS
HEIGHT: 63 IN | HEART RATE: 104 BPM | BODY MASS INDEX: 24.45 KG/M2 | RESPIRATION RATE: 16 BRPM | DIASTOLIC BLOOD PRESSURE: 67 MMHG | OXYGEN SATURATION: 100 % | SYSTOLIC BLOOD PRESSURE: 114 MMHG | WEIGHT: 138 LBS | TEMPERATURE: 98.2 F

## 2024-01-22 DIAGNOSIS — C50.812 MALIGNANT NEOPLASM OF OVERLAPPING SITES OF LEFT BREAST IN FEMALE, ESTROGEN RECEPTOR POSITIVE (H): ICD-10-CM

## 2024-01-22 DIAGNOSIS — M85.80 OSTEOPENIA, UNSPECIFIED LOCATION: ICD-10-CM

## 2024-01-22 DIAGNOSIS — Z17.0 MALIGNANT NEOPLASM OF OVERLAPPING SITES OF LEFT BREAST IN FEMALE, ESTROGEN RECEPTOR POSITIVE (H): ICD-10-CM

## 2024-01-22 DIAGNOSIS — N95.1 MENOPAUSAL SYNDROME (HOT FLASHES): Primary | ICD-10-CM

## 2024-01-22 DIAGNOSIS — Z78.0 POSTMENOPAUSAL STATUS: ICD-10-CM

## 2024-01-22 PROCEDURE — 99214 OFFICE O/P EST MOD 30 MIN: CPT | Performed by: PHYSICIAN ASSISTANT

## 2024-01-22 PROCEDURE — 77063 BREAST TOMOSYNTHESIS BI: CPT | Mod: GC | Performed by: RADIOLOGY

## 2024-01-22 PROCEDURE — 99213 OFFICE O/P EST LOW 20 MIN: CPT | Performed by: PHYSICIAN ASSISTANT

## 2024-01-22 PROCEDURE — 77067 SCR MAMMO BI INCL CAD: CPT | Mod: GC | Performed by: RADIOLOGY

## 2024-01-22 RX ORDER — BACLOFEN 20 MG
TABLET ORAL
COMMUNITY

## 2024-01-22 RX ORDER — MULTIVITAMIN
1 TABLET ORAL DAILY
COMMUNITY

## 2024-01-22 RX ORDER — OMEGA-3/DHA/EPA/FISH OIL 60 MG-90MG
CAPSULE ORAL
COMMUNITY

## 2024-01-22 RX ORDER — CHOLECALCIFEROL (VITAMIN D3) 50 MCG
1 TABLET ORAL DAILY
COMMUNITY

## 2024-01-22 ASSESSMENT — PAIN SCALES - GENERAL: PAINLEVEL: NO PAIN (0)

## 2024-01-22 NOTE — PROGRESS NOTES
"Jan 22, 2024      Medical Oncology Follow-up    Reason for visit: left breast cancer     HPI:  This is a 38 y.o. Female (older than her stated age likely in her 50s based on immigration and change of age), with T2N1a left breast cancer, ER positive. This was picked up by the patient. She noticed an indentation or dimpling of her breast several months ago. She brought it up to her physician when she was seen because of neck and arm pain after a Covid vaccine. She underwent a mammogram and ultrasound. A needle biopsy was done which shows an invasive ductal carcinoma. It is estrogen receptor positive 95%, progesterone receptor positive weakly 3% and HER-2 negative 0 by IHC.  R breast revealed two fibroadenomas.  L breast - 11x13 mm mass in the 1 oclock position 9 cm from the nipple.  Lymph nodes appeared normal.       She met with Dr Kent.  She underwent a lumpectomy with SNLB.      Oncotype Dx score returned at 17 giving her a risk of metastatic disease of 15% at 10 years.  No benefit from chemotherapy. I recommended adjuvant radiation and adjuvant endocrine therapy. Lab work confirmed postmenopausal (she told me her age is not accurate in the computer).     She received 5040 cGy in 28 treatments targeted to the left breast/chest wall and regional lymph nodes followed by an additional 1000 cGy in 5 fractions to the primary tumor bed utilizing electrons. Her radiation therapy was from 4/12/2021-6/2/2021 under the care of Dr. Iza Espinoza at [facility].     Interval History:   Xiao suffered terrible arthralgias from the AIs.   She then went on Tamoxifen earlier this year.  She did note initially that things were much better.  However with time, she developed severe hot flashes, blurred vision, fatigue and mood changes. She messaged Dr Kenny in November and stopped taking it.  It was recommended to take 10 mg, but she has not.      Since being off, she has had significant improvement and been feeling back to \"normal\".  She " "did see an alternative MD and was given Black Cohosh and evening primrose.  She feels they have helped stop her hot flashes and she feels ready to try the lower dose of Tamoxifen.     She had previously been working with lymphedema therapy, including working on exercises and using a lymphedema wrap for her arm.     Lives at home one HS daughter going to  next year and older daughter at  already. Working as an EEG technologist.      Of note, her birthday is [birth year], she is 51 yo.  She has been post menopausal for a few years.       ROS: 10 point ROS neg other than the symptoms noted above in the HPI.      Past Medical History:   Diagnosis Date    Abnormal glandular Papanicolaou smear of cervix     confusing info related to pap    Breast cancer (H)     History of colonic polyps 2021    Invasive ductal carcinoma of breast, left (H) 2021    Pain in joint, lower leg     Previous  delivery, antepartum condition or complication 2005     Current Outpatient Medications   Medication    calcium carbonate 600 mg-vitamin D 400 units (CALTRATE) 600-400 MG-UNIT per tablet    fish oil-omega-3 fatty acids 500 MG capsule    Magnesium Oxide -Mg Supplement 500 MG TABS    multivitamin w/minerals (MULTI-VITAMIN) tablet    vitamin D3 (CHOLECALCIFEROL) 50 mcg (2000 units) tablet    omeprazole (PRILOSEC) 20 MG DR capsule    tamoxifen (NOLVADEX) 10 MG tablet    tamoxifen (NOLVADEX) 20 MG tablet    venlafaxine (EFFEXOR XR) 37.5 MG 24 hr capsule     No current facility-administered medications for this visit.       PHYSICAL EXAM:  /67 (BP Location: Right arm, Patient Position: Sitting, Cuff Size: Adult Regular)   Pulse 104   Temp 98.2  F (36.8  C) (Oral)   Resp 16   Ht 1.6 m (5' 2.99\")   Wt 62.6 kg (138 lb)   SpO2 100%   BMI 24.45 kg/m     Gen: Alert and oriented. No distress, pleasant  Lungs: CTAB, normal work of breathing on room air   Cards: RRR  GI: normal bowel sounds, nontender to palpation "   Breast: Left lower breast more firm inferiorly than right, healed left lumpectomy incision, otherwise appears symmetrical   Neuro: No focal neural deficits, moving all extremities, ambulating independently   Psych: Euthymic, appropriate mood and affect    Narrative & Impression   BILATERAL FULL FIELD DIGITAL SCREENING MAMMOGRAM WITH TOMOSYNTHESIS     Performed on: 1/22/24     Compared to: 01/05/2023, 12/15/2021, and 01/20/2021     Technique:  This study was evaluated with the assistance of Computer-Aided Detection.  Breast Tomosynthesis was used in interpretation.     Findings: The breasts have scattered areas of fibroglandular density.  There are post-surgical changes in the left breast.  There is no radiographic evidence of malignancy.                                                                    IMPRESSION: ACR BI-RADS Category 2: Benign     BREAST CANCER SCREENING RECOMMENDATION: Routine yearly mammography beginning at age 40 or as discussed with your provider.         A/P:  49y/o female with a pT2N1a left breast cancer s/p lumpectomy and SNLB.  The tumor is ER + essentially [place] negative, her2 negative. She was previously on letrozole, but had significant hot flashes and joint pain and was switched to anastrozole (1/2022).  Started on tamoxifen (early 2023), stopped in Nov 2023 and now ready to try a smaller dose.     Left breast cancer: S/p lumpectomy + SLNB s/p adjuvant radiation. Unfortunately she was having sigificant joint aches with the letrozole that was affecting her quality of life. She was switched to anastrozole, with continuation of these symptoms.  She is now on tamoxifen and she stopped due to hot flashes, vision changes and fatigue.  She is now taking black cohosh and evening primrose.  We do not have good evidence to understand if these herbs increase the breast cancer risk. She and I did have a good conversation about her oncotype score, risk of recurrence and the need to continue to try  and tolerate some type of endocrine therapy.  She agreed to try the 10 mg of Tamoxifen and then we can consider dropping one/both of these herbal options in the future if she tolerates the 10 mg Bravo dose.          Bone health: DEXA scan previously showed osteopenia. Discussed calcium and vitamin D and weight bearing exercise. She is no longer on an AI, however we can get a repeat DEXA this year sometime.          Carmen Lenz PA-C  40 minutes spent on the date of the encounter doing chart review, review of test results, interpretation of tests, patient visit, and documentation

## 2024-01-22 NOTE — Clinical Note
1/22/2024         RE: Xiao Beal  [address]  [place] [address]        Dear Colleague,    Thank you for referring your patient, Xiao Beal, to the [facility]. Please see a copy of my visit note below.    October 26, 2023    Medical Oncology Follow-up    Reason for visit: left breast cancer     HPI:  This is a 38 y.o. Female (older than her stated age likely in her 50s based on immigration and change of age), with T2N1a left breast cancer, ER positive. This was picked up by the patient. She noticed an indentation or dimpling of her breast several months ago. She brought it up to her physician when she was seen because of neck and arm pain after a Covid vaccine. She underwent a mammogram and ultrasound. A needle biopsy was done which shows an invasive ductal carcinoma. It is estrogen receptor positive 95%, progesterone receptor positive weakly 3% and HER-2 negative 0 by IHC.  R breast revealed two fibroadenomas.  L breast - 11x13 mm mass in the 1 oclock position 9 cm from the nipple.  Lymph nodes appeared normal.       She met with Dr Kent.  She underwent a lumpectomy with SNLB.      Oncotype Dx score returned at 17 giving her a risk of metastatic disease of 15% at 9 years.  No benefit from chemotherapy. I recommended adjuvant radiation and adjuvant endocrine therapy. Lab work confirmed postmenopausal (she told me her age is not accurate in the computer).     She received 5040 cGy in 28 treatments targeted to the left breast/chest wall and regional lymph nodes followed by an additional 1000 cGy in 5 fractions to the primary tumor bed utilizing electrons. Her radiation therapy was from 4/12/2021-6/2/2021 under the care of Dr. Iza Espinoza at [facility].     Interval History:   Xiao suffered terrible arthralgias from the AIs.   She then went on Tamoxifen earlier this year.  She did note initially that things were much better.  However with time, she developed severe hot  "flashes, blurred vision, fatigue and mood changes. She messaged Dr Kenny in November and stopped taking it.  It was recommended to take 10 mg, but she has not.      Since being off, she has had significant    She had previously been working with lymphedema therapy, including working on exercises and using a lymphedema wrap for her arm. Lives at home with 2 teenagers. Working as an EEG technologist.  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 next year.  [birth year], 49 yo    CDG EstroDIM 60 caps  Black Cohosh  Evening primrose    ROS: 10 point ROS neg other than the symptoms noted above in the HPI.      Past Medical History:   Diagnosis Date    Abnormal glandular Papanicolaou smear of cervix     confusing info related to pap    Breast cancer (H)     History of colonic polyps 2021    Invasive ductal carcinoma of breast, left (H) 2021    Pain in joint, lower leg     Previous  delivery, antepartum condition or complication 2005     Current Outpatient Medications   Medication    calcium carbonate 600 mg-vitamin D 400 units (CALTRATE) 600-400 MG-UNIT per tablet    fish oil-omega-3 fatty acids 500 MG capsule    Magnesium Oxide -Mg Supplement 500 MG TABS    multivitamin w/minerals (MULTI-VITAMIN) tablet    vitamin D3 (CHOLECALCIFEROL) 50 mcg (2000 units) tablet    omeprazole (PRILOSEC) 20 MG DR capsule    tamoxifen (NOLVADEX) 10 MG tablet    tamoxifen (NOLVADEX) 20 MG tablet    venlafaxine (EFFEXOR XR) 37.5 MG 24 hr capsule     No current facility-administered medications for this visit.       PHYSICAL EXAM:  /67 (BP Location: Right arm, Patient Position: Sitting, Cuff Size: Adult Regular)   Pulse 104   Temp 98.2  F (36.8  C) (Oral)   Resp 16   Ht 1.6 m (5' 2.99\")   Wt 62.6 kg (138 lb)   SpO2 100%   BMI 24.45 kg/m     Gen: Alert and oriented. No distress, pleasant  Lungs: CTAB, normal work of breathing on room air   Cards: RRR  GI: normal bowel sounds, nontender to palpation   Breast: " Left lower breast more firm inferiorly than right, healed left lumpectomy incision, otherwise appears symmetrical   Neuro: No focal neural deficits, moving all extremities, ambulating independently   Psych: Euthymic, appropriate mood and affect    I reviewed her imaging personally with radiology demonstrating no masses or lesions.    A/P:  39y/o female with a pT2N1a left breast cancer s/p lumpectomy and SNLB.  The tumor is ER + essentially [place] negative, her2 negative. She was previously on letrozole, but had significant hot flashes and joint pain and was switched to anastrozole (1/2022).  She is now on tamoxifen (2023)    Left breast cancer: S/p lumpectomy + SLNB s/p adjuvant radiation. Unfortunately she was having sigificant joint aches with the letrozole that was affecting her quality of life. She was switched to anastrozole, with continuation of these symptoms.  She is now on tamoxifen.  We discussed continuing tamoxifen.     Return in 3 months for symptom check in with ZANDER and with me with 6 months.    -Repeat mammogram in January.    Bone health: DEXA scan was reviewed showing osteopenia. Discussed calcium and vitamin D and weight bearing exercise. We discussed bisphosphonates as prophylaxis.  Encouraged exercise.         GERD - omeprazole renewed.    Cristy Kenny MD     Jan 22, 2024      Medical Oncology Follow-up    Reason for visit: left breast cancer     HPI:  This is a 38 y.o. Female (older than her stated age likely in her 50s based on immigration and change of age), with T2N1a left breast cancer, ER positive. This was picked up by the patient. She noticed an indentation or dimpling of her breast several months ago. She brought it up to her physician when she was seen because of neck and arm pain after a Covid vaccine. She underwent a mammogram and ultrasound. A needle biopsy was done which shows an invasive ductal carcinoma. It is estrogen receptor positive 95%, progesterone receptor positive weakly 3% and  "HER-2 negative 0 by IHC.  R breast revealed two fibroadenomas.  L breast - 11x13 mm mass in the 1 oclock position 9 cm from the nipple.  Lymph nodes appeared normal.       She met with Dr Kent.  She underwent a lumpectomy with SNLB.      Oncotype Dx score returned at 17 giving her a risk of metastatic disease of 15% at 9 years.  No benefit from chemotherapy. I recommended adjuvant radiation and adjuvant endocrine therapy. Lab work confirmed postmenopausal (she told me her age is not accurate in the computer).     She received 5040 cGy in 28 treatments targeted to the left breast/chest wall and regional lymph nodes followed by an additional 1000 cGy in 5 fractions to the primary tumor bed utilizing electrons. Her radiation therapy was from 4/12/2021-6/2/2021 under the care of Dr. Iza Espinoza at [facility].     Interval History:   Xiao suffered terrible arthralgias from the AIs.   She then went on Tamoxifen earlier this year.  She did note initially that things were much better.  However with time, she developed severe hot flashes, blurred vision, fatigue and mood changes. She messaged Dr Kenny in November and stopped taking it.  It was recommended to take 10 mg, but she has not.      Since being off, she has had significant improvement and been feeling back to \"normal\".  She did see an alternative MD and was given Black Cohosh and evening primrose.  She feels they have helped stop her hot flashes and she feels ready to try the lower dose of Tamoxifen.     She had previously been working with lymphedema therapy, including working on exercises and using a lymphedema wrap for her arm.     Lives at home one HS daughter going to  next year and older daughter at  already. Working as an EEG technologist.      Of note, her birthday is [birth year], she is 51 yo.  She has been post menopausal for a few years.       ROS: 10 point ROS neg other than the symptoms noted above in the HPI.      Past Medical History:   Diagnosis Date " "    Abnormal glandular Papanicolaou smear of cervix     confusing info related to pap     Breast cancer (H)      History of colonic polyps 2021     Invasive ductal carcinoma of breast, left (H) 2021     Pain in joint, lower leg      Previous  delivery, antepartum condition or complication 2005     Current Outpatient Medications   Medication     calcium carbonate 600 mg-vitamin D 400 units (CALTRATE) 600-400 MG-UNIT per tablet     fish oil-omega-3 fatty acids 500 MG capsule     Magnesium Oxide -Mg Supplement 500 MG TABS     multivitamin w/minerals (MULTI-VITAMIN) tablet     vitamin D3 (CHOLECALCIFEROL) 50 mcg (2000 units) tablet     omeprazole (PRILOSEC) 20 MG DR capsule     tamoxifen (NOLVADEX) 10 MG tablet     tamoxifen (NOLVADEX) 20 MG tablet     venlafaxine (EFFEXOR XR) 37.5 MG 24 hr capsule     No current facility-administered medications for this visit.       PHYSICAL EXAM:  /67 (BP Location: Right arm, Patient Position: Sitting, Cuff Size: Adult Regular)   Pulse 104   Temp 98.2  F (36.8  C) (Oral)   Resp 16   Ht 1.6 m (5' 2.99\")   Wt 62.6 kg (138 lb)   SpO2 100%   BMI 24.45 kg/m     Gen: Alert and oriented. No distress, pleasant  Lungs: CTAB, normal work of breathing on room air   Cards: RRR  GI: normal bowel sounds, nontender to palpation   Breast: Left lower breast more firm inferiorly than right, healed left lumpectomy incision, otherwise appears symmetrical   Neuro: No focal neural deficits, moving all extremities, ambulating independently   Psych: Euthymic, appropriate mood and affect    Narrative & Impression   BILATERAL FULL FIELD DIGITAL SCREENING MAMMOGRAM WITH TOMOSYNTHESIS     Performed on: 24     Compared to: 2023, 12/15/2021, and 2021     Technique:  This study was evaluated with the assistance of Computer-Aided Detection.  Breast Tomosynthesis was used in interpretation.     Findings: The breasts have scattered areas of fibroglandular " density.  There are post-surgical changes in the left breast.  There is no radiographic evidence of malignancy.                                                                    IMPRESSION: ACR BI-RADS Category 2: Benign     BREAST CANCER SCREENING RECOMMENDATION: Routine yearly mammography beginning at age 40 or as discussed with your provider.         A/P:  49y/o female with a pT2N1a left breast cancer s/p lumpectomy and SNLB.  The tumor is ER + essentially [place] negative, her2 negative. She was previously on letrozole, but had significant hot flashes and joint pain and was switched to anastrozole (1/2022).  Started on tamoxifen (early 2023), stopped in Nov 2023 and now ready to try a smaller dose.     Left breast cancer: S/p lumpectomy + SLNB s/p adjuvant radiation. Unfortunately she was having sigificant joint aches with the letrozole that was affecting her quality of life. She was switched to anastrozole, with continuation of these symptoms.  She is now on tamoxifen and she stopped due to hot flashes, vision changes and fatigue.  She is now taking black cohosh and evening primrose.  We do not have good evidence to understand if these herbs increase the breast cancer risk. She and I did have a good conversation about her oncotype score, risk of recurrence and the need to continue to try and tolerate some type of endocrine therapy.  She agreed to try the 10 mg of Tamoxifen and then we can consider dropping one/both of these herbal options in the future if she tolerates the 10 mg Bravo dose.          Bone health: DEXA scan previously showed osteopenia. Discussed calcium and vitamin D and weight bearing exercise. She is no longer on an AI, however we can get a repeat DEXA this year sometime.          Carmen Lenz PA-C  40 minutes spent on the date of the encounter doing chart review, review of test results, interpretation of tests, patient visit, and documentation         Again, thank you for allowing me to  participate in the care of your patient.        Sincerely,        Alicia Lenz PA-C

## 2024-01-22 NOTE — NURSING NOTE
"Oncology Rooming Note    January 22, 2024 3:27 PM   Xiao Beal is a 38 year old female who presents for:    Chief Complaint   Patient presents with    Oncology Clinic Visit     Malignant neoplasm of overlapping sites of left breast in female, estrogen receptor positive     Initial Vitals: /67 (BP Location: Right arm, Patient Position: Sitting, Cuff Size: Adult Regular)   Pulse 104   Temp 98.2  F (36.8  C) (Oral)   Resp 16   Ht 1.6 m (5' 2.99\")   Wt 62.6 kg (138 lb)   SpO2 100%   BMI 24.45 kg/m   Estimated body mass index is 24.45 kg/m  as calculated from the following:    Height as of this encounter: 1.6 m (5' 2.99\").    Weight as of this encounter: 62.6 kg (138 lb). Body surface area is 1.67 meters squared.  No Pain (0) Comment: Data Unavailable   No LMP recorded. Patient is perimenopausal.  Allergies reviewed: Yes  Medications reviewed: Yes    Medications: Medication refills not needed today.  Pharmacy name entered into [facility]:    [facility] [place], [place] - [address]  [id number] [place], [place] - [address] [facility] [address]  [id number] [place] - [address] [facility] [address]  [id number] - [place], [place] - [address] [facility] [address]    Frailty Screening:   Is the patient here for a new oncology consult visit in cancer care? 2. No      Clinical concerns: none       Alpa Logan              "

## 2024-01-23 ENCOUNTER — TELEPHONE (OUTPATIENT)
Dept: FAMILY MEDICINE | Facility: CLINIC | Age: 39
End: 2024-01-23
Payer: COMMERCIAL

## 2024-01-23 DIAGNOSIS — Z11.1 SCREENING-PULMONARY TB: Primary | ICD-10-CM

## 2024-01-23 NOTE — TELEPHONE ENCOUNTER
General Call    Contacts         Type Contact Phone/Fax    01/23/2024 02:56 PM CST Phone (Incoming) Xiao Beal (Self) [phone] (M)          Reason for Call:  patient called in and told me she needs quantiferon gold testing for TB for her new job. She told me that in 2021 they did testing for this.    (Pulled from visit on 6-)  . History of latent tuberculosis  History of latent TB s/p full course of INH therapy in 2004.  She is asymptomatic from a pulmonary standpoint.  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  We will also prime patient with PCV 13 given history of latent TB and immunosuppression secondary to left breast cancer discussed above.  - Pneumococcal conjugate vaccine 13-valent 6wks-17yrs; >50yrs  - XR Chest 1 View    She wants a call back with what can be done to complete this.  Please advise.      Could we send this information to you in [facility] or would you prefer to receive a phone call?:   Patient would prefer a phone call   Okay to leave a detailed message?: Yes at Cell number on file:    Telephone Information:   Mobile [phone]

## 2024-01-24 ENCOUNTER — HOSPITAL ENCOUNTER (OUTPATIENT)
Dept: GENERAL RADIOLOGY | Facility: HOSPITAL | Age: 39
Discharge: HOME | End: 2024-01-24
Attending: FAMILY MEDICINE | Admitting: FAMILY MEDICINE
Payer: COMMERCIAL

## 2024-01-24 DIAGNOSIS — Z11.1 SCREENING-PULMONARY TB: ICD-10-CM

## 2024-01-24 PROCEDURE — 71046 X-RAY EXAM CHEST 2 VIEWS: CPT

## 2024-01-24 NOTE — TELEPHONE ENCOUNTER
Please inform patient that she needs a chest x-ray for screening pulmonary TB as she has been positive for Mantoux's test in the past.  This would likely result in a positive gold QuantiFERON test.  In such cases x-ray is a better option.  I have placed her order and she can come and get x-ray done.    Emmanuel Peterson MD

## 2024-01-24 NOTE — TELEPHONE ENCOUNTER
General Call    Contacts         Type Contact Phone/Fax    01/23/2024 02:56 PM CST Phone (Incoming) Xiao Beal (Self) [phone] (M)    01/24/2024 10:33 AM CST Phone (Incoming) Xiao Beal (Self) [phone] (M)          Reason for Call:  patient calling back, wanted to know what has been done or if this has been responded to. I told her it has not been address yet. She told me that they are holding up the process to get her hired right now. She needs this done urgently, I explained to her that I would send it to her and her covering provider today since she is not in on Wednesday's.       Could we send this information to you in [facility] or would you prefer to receive a phone call?:   Patient would prefer a phone call   Okay to leave a detailed message?: Yes at Cell number on file:    Telephone Information:   Mobile [phone]

## 2024-01-29 ENCOUNTER — OFFICE VISIT (OUTPATIENT)
Dept: FAMILY MEDICINE | Facility: CLINIC | Age: 39
End: 2024-01-29
Payer: COMMERCIAL

## 2024-01-29 VITALS
DIASTOLIC BLOOD PRESSURE: 58 MMHG | BODY MASS INDEX: 24.1 KG/M2 | TEMPERATURE: 97.8 F | WEIGHT: 136 LBS | HEART RATE: 81 BPM | HEIGHT: 63 IN | OXYGEN SATURATION: 98 % | RESPIRATION RATE: 16 BRPM | SYSTOLIC BLOOD PRESSURE: 102 MMHG

## 2024-01-29 DIAGNOSIS — R21 RASH AND NONSPECIFIC SKIN ERUPTION: ICD-10-CM

## 2024-01-29 DIAGNOSIS — Z17.0 MALIGNANT NEOPLASM OF OVERLAPPING SITES OF LEFT BREAST IN FEMALE, ESTROGEN RECEPTOR POSITIVE (H): ICD-10-CM

## 2024-01-29 DIAGNOSIS — Z00.00 ROUTINE GENERAL MEDICAL EXAMINATION AT A HEALTH CARE FACILITY: Primary | ICD-10-CM

## 2024-01-29 DIAGNOSIS — C50.812 MALIGNANT NEOPLASM OF OVERLAPPING SITES OF LEFT BREAST IN FEMALE, ESTROGEN RECEPTOR POSITIVE (H): ICD-10-CM

## 2024-01-29 PROCEDURE — 90715 TDAP VACCINE 7 YRS/> IM: CPT | Performed by: FAMILY MEDICINE

## 2024-01-29 PROCEDURE — 90471 IMMUNIZATION ADMIN: CPT | Performed by: FAMILY MEDICINE

## 2024-01-29 PROCEDURE — 99395 PREV VISIT EST AGE 18-39: CPT | Mod: 25 | Performed by: FAMILY MEDICINE

## 2024-01-29 RX ORDER — TRIAMCINOLONE ACETONIDE 5 MG/G
1 OINTMENT TOPICAL 2 TIMES DAILY
Qty: 60 G | Refills: 3 | Status: SHIPPED | OUTPATIENT
Start: 2024-01-29

## 2024-01-29 ASSESSMENT — ENCOUNTER SYMPTOMS
SORE THROAT: 0
HEARTBURN: 0
JOINT SWELLING: 0
PALPITATIONS: 0
HEMATOCHEZIA: 0
EYE PAIN: 0
DIARRHEA: 0
DYSURIA: 0
DIZZINESS: 0
BREAST MASS: 0
PARESTHESIAS: 1
MYALGIAS: 0
FREQUENCY: 0
NAUSEA: 0
ABDOMINAL PAIN: 0
FEVER: 0
CONSTIPATION: 0
SHORTNESS OF BREATH: 0
ARTHRALGIAS: 1
HEMATURIA: 0
NERVOUS/ANXIOUS: 0
WEAKNESS: 0
COUGH: 0
CHILLS: 0
HEADACHES: 1

## 2024-01-29 NOTE — PROGRESS NOTES
Preventive Care Visit  [facility]  Emmanuel Peterson MD, Family Medicine  Jan 29, 2024      ASSESSMENT/PLAN:       ICD-10-CM    1. Routine general medical examination at a health care facility  Z00.00 CBC with platelets and differential     Lipid panel reflex to direct LDL Fasting     Comprehensive metabolic panel (BMP + Alb, Alk Phos, ALT, AST, Total. Bili, TP)      2. Malignant neoplasm of overlapping sites of left breast in female, estrogen receptor positive (H)  C50.812     Z17.0       3. Rash and nonspecific skin eruption  R21 triamcinolone (KENALOG) 0.5 % external ointment        Patient has dry skin dermatitis versus slight eczema which flares up during winter months.  Use triamcinolone as needed and stable moisturized.  Encouraged to keep taking tamoxifen.  She will take it at a reduced dose and follow with oncology.  Reviewed supplements and lab work that she is done with an alternative medicine specialist.  These were fairly normal.  Okay to pursue testing as desired.  For breast cancer, following with specialist.  On tamoxifen now.  She had quit because of side effects.  Plans to start taking it at a lower dose.    Counseling  Reviewed preventive health counseling, as reflected in patient instructions       Regular exercise       Healthy diet/nutrition       Vision screening       Osteoporosis prevention/bone health       (Evangelina)menopause management        She reports that she has never smoked. She has never used smokeless tobacco.     SUBJECTIVE:   Xiao is a 38 year old, presenting for the following:  Physical        1/29/2024     4:44 PM   Additional Questions   Roomed by      Healthy Habits:     Getting at least 3 servings of Calcium per day:  Yes    Bi-annual eye exam:  Yes    Dental care twice a year:  Yes    Sleep apnea or symptoms of sleep apnea:  None    Diet:  Vegetarian/vegan    Frequency of exercise:  1 day/week    Duration of exercise:  15-30 minutes    Taking  medications regularly:  Yes    Medication side effects:  Significant flushing    Additional concerns today:  No  Answers submitted by the patient for this visit:  Annual Preventive Visit (Submitted on 1/29/2024)  Chief Complaint: Annual Exam:  Blood in stool: No  heartburn: No  peripheral edema: No  mood changes: No  Skin sensation changes: Yes  tenderness: No  breast mass: No  breast discharge: No      Today's PHQ-2 Score:       1/29/2024     4:39 PM   PHQ-2 ( 1999 Pf[employer])   Q1: Little interest or pleasure in doing things 0   Q2: Feeling down, depressed or hopeless 0   PHQ-2 Score 0   Q1: Little interest or pleasure in doing things Not at all   Q2: Feeling down, depressed or hopeless Not at all   PHQ-2 Score 0           Social History     Tobacco Use    Smoking status: Never    Smokeless tobacco: Never   Substance Use Topics    Alcohol use: No             1/29/2024     4:39 PM   Alcohol Use   Prescreen: >3 drinks/day or >7 drinks/week? Not Applicable     Reviewed orders with patient.  Reviewed health maintenance and updated orders accordingly - Yes  BP Readings from Last 3 Encounters:   01/29/24 102/58   01/22/24 114/67   10/26/23 100/56    Wt Readings from Last 3 Encounters:   01/29/24 61.7 kg (136 lb)   01/22/24 62.6 kg (138 lb)   10/26/23 62.3 kg (137 lb 6.4 oz)                  Patient Active Problem List   Diagnosis    Abnormal glandular Papanicolaou smear of cervix    Malignant neoplasm of overlapping sites of left breast in female, estrogen receptor positive (H)    Invasive ductal carcinoma of breast, left (H)    Migraine without aura    Precordial pain     Past Surgical History:   Procedure Laterality Date    BIOPSY BREAST Left 2004    benign    BIOPSY BREAST Right 2004    benign    COLONOSCOPY      CA MASTECTOMY, PARTIAL Left 02/19/2021    Left Lumpectomy; [place] Lymph Node Biopsy;  Surgeon: Desire Kent MD;  Location: [facility];  Service: General    US BIOPSY CORE LYMPH NODE (BREAST) LEFT  Left 2021    US BREAST CORE BIOPSY LEFT Left 2021    US BREAST CORE BIOPSY RIGHT Right 2021    US BREAST CORE BIOPSY RIGHT Right 2021    US SENTINEL NODE INJECTION  2021    [facility]  DELIVERY ONLY  10-09-04    , Low Cervical. Transfused with 2 units    [facility]  DELIVERY ONLY N/A        Social History     Tobacco Use    Smoking status: Never    Smokeless tobacco: Never   Substance Use Topics    Alcohol use: No     Family History   Problem Relation Age of Onset    Gastrointestinal Disease Mother         diverticulitis    Glaucoma Mother     Cancer Father         lung cancer    Macular Degeneration No family hx of     Diverticulitis Mother     Lung Cancer Father     No Known Problems Sister     No Known Problems Brother     No Known Problems Daughter     No Known Problems Daughter     No Known Problems Sister     No Known Problems Brother     CABG No family hx of     ICD - Implantable Cardioverter Defibrillator No family hx of     Pacemaker No family hx of     Sudden Death No family hx of          Current Outpatient Medications   Medication Sig Dispense Refill    B Complex Vitamins (B COMPLEX 50 PO) Take by mouth daily      BLACK COHOSH PO Take 60 mg by mouth 2 times daily      calcium carbonate 600 mg-vitamin D 400 units (CALTRATE) 600-400 MG-UNIT per tablet Take 1 tablet by mouth daily      EVENING PRIMROSE OIL PO Take 1,000 mg by mouth 2 times daily      fish oil-omega-3 fatty acids 500 MG capsule       Magnesium Oxide -Mg Supplement 500 MG TABS       multivitamin w/minerals (MULTI-VITAMIN) tablet Take 1 tablet by mouth daily      triamcinolone (KENALOG) 0.5 % external ointment Apply 1 g topically 2 times daily 60 g 3    UNABLE TO FIND CDG EstroDIM 60 - BID      vitamin D3 (CHOLECALCIFEROL) 50 mcg (2000 units) tablet Take 1 tablet by mouth daily         Breast Cancer Screening:    FHS-7:       12/15/2021     9:29 AM 2023    10:07 AM 2024     2:59 PM   Breast CA  Risk Assessment (FHS-7)   Did any of your first-degree relatives have breast or ovarian cancer? No No No   Did any of your relatives have bilateral breast cancer? No No No   Did any man in your family have breast cancer? No No No   Did any woman in your family have breast and ovarian cancer? No No No   Did any woman in your family have breast cancer before age 50 y? No No No   Do you have 2 or more relatives with breast and/or ovarian cancer? No No No   Do you have 2 or more relatives with breast and/or bowel cancer? No No No       Mammogram Screening - Alternate mammogram schedule due to breast cancer history  Pertinent mammograms are reviewed under the imaging tab.    History of abnormal Pap smear: NO - age 30-65 PAP every 5 years with negative HPV co-testing recommended      Latest Ref Rng & Units 2022     9:41 AM 3/9/2018    12:27 PM 3/9/2018    11:57 AM   PAP / HPV   PAP  Negative for Intraepithelial Lesion or Malignancy (NILM)      PAP (Historical)    NIL    HPV 16 DNA Negative Negative  Negative     HPV 18 DNA Negative Negative  Negative     Other HR HPV Negative Negative  Negative       Reviewed and updated as needed this visit by clinical staff   Tobacco  Allergies  Meds  Problems  Med Hx  Surg Hx  Fam Hx          Reviewed and updated as needed this visit by Provider   Tobacco  Allergies  Meds  Problems  Med Hx  Surg Hx  Fam Hx          Past Medical History:   Diagnosis Date    Abnormal glandular Papanicolaou smear of cervix     confusing info related to pap    Breast cancer (H)     History of colonic polyps 2021    Invasive ductal carcinoma of breast, left (H) 2021    Pain in joint, lower leg     Previous  delivery, antepartum condition or complication 2005      Past Surgical History:   Procedure Laterality Date    BIOPSY BREAST Left     benign    BIOPSY BREAST Right     benign    COLONOSCOPY      SC MASTECTOMY, PARTIAL Left 2021    Left  "Lumpectomy; [place] Lymph Node Biopsy;  Surgeon: Desire Kent MD;  Location: [facility];  Service: General    US BIOPSY CORE LYMPH NODE (BREAST) LEFT Left 2021     BREAST CORE BIOPSY LEFT Left 2021    US BREAST CORE BIOPSY RIGHT Right 2021    US BREAST CORE BIOPSY RIGHT Right 2021     SENTINEL NODE INJECTION  2021    [facility]  DELIVERY ONLY  10-09-04    , Low Cervical. Transfused with 2 units    [facility]  DELIVERY ONLY N/A      Review of Systems   Constitutional:  Negative for chills and fever.   HENT:  Negative for congestion, ear pain, hearing loss and sore throat.    Eyes:  Positive for visual disturbance. Negative for pain.   Respiratory:  Negative for cough and shortness of breath.    Cardiovascular:  Negative for chest pain and palpitations.   Gastrointestinal:  Negative for abdominal pain, constipation, diarrhea and nausea.   Genitourinary:  Negative for dysuria, frequency, genital sores, hematuria, pelvic pain, urgency, vaginal bleeding and vaginal discharge.   Musculoskeletal:  Positive for arthralgias. Negative for joint swelling and myalgias.   Skin:  Positive for rash.   Neurological:  Positive for headaches. Negative for dizziness and weakness.   Psychiatric/Behavioral:  The patient is not nervous/anxious.      Vision Disturbance- Got glasses  Arthralgia- Knee pain  Rash-   Headaches- With tamoxifen    OBJECTIVE:   /58   Pulse 81   Temp 97.8  F (36.6  C)   Resp 16   Ht 1.594 m (5' 2.75\")   Wt 61.7 kg (136 lb)   LMP  (LMP Unknown)   SpO2 98%   BMI 24.28 kg/m     Estimated body mass index is 24.28 kg/m  as calculated from the following:    Height as of this encounter: 1.594 m (5' 2.75\").    Weight as of this encounter: 61.7 kg (136 lb).  Physical Exam  GENERAL: alert and no distress  EYES: Eyes grossly normal to inspection, PERRL and conjunctivae and sclerae normal  HENT: ear canals and TM's normal, nose and mouth without ulcers or " lesions  NECK: no adenopathy, no asymmetry, masses, or scars  RESP: lungs clear to auscultation - no rales, rhonchi or wheezes  CV: regular rate and rhythm, normal S1 S2, no S3 or S4, no murmur, click or rub, no peripheral edema  ABDOMEN: soft, nontender, no hepatosplenomegaly, no masses and bowel sounds normal  MS: no gross musculoskeletal defects noted, no edema  SKIN: no suspicious lesions .  Dry skin with occasional papules noted on the abdomen.  NEURO: Normal strength and tone, mentation intact and speech normal  PSYCH: mentation appears normal, affect normal/bright  LYMPH: no cervical, supraclavicular, axillary, or inguinal adenopathy    Diagnostic Test Results:  Labs reviewed in Epic        Signed Electronically by: Emmanuel Peterson MD

## 2024-04-01 ENCOUNTER — NURSE TRIAGE (OUTPATIENT)
Dept: NURSING | Facility: CLINIC | Age: 39
End: 2024-04-01
Payer: COMMERCIAL

## 2024-04-01 NOTE — TELEPHONE ENCOUNTER
COVID Positive/Requesting COVID treatment    Patient is positive for COVID and requesting treatment options.    Date of positive COVID test (PCR or at home)? 3/31/24  Current COVID symptoms: fever or chills, cough, fatigue, muscle or body aches, headache, new loss of taste or smell, sore throat, and congestion or runny nose  Date COVID symptoms began: 3/27/24    Advised HC and when to call back. Pt verbalized understanding.    Tess Francisco, RN, BSN  [facility] Nurse Advisor 4:06 PM 4/1/2024      Reason for Disposition   COVID-19 diagnosed by positive lab test (e.g., PCR, rapid self-test kit) and NO symptoms (e.g., cough, fever, others)    Additional Information   Negative: SEVERE difficulty breathing (e.g., struggling for each breath, speaks in single words)   Negative: Difficult to awaken or acting confused (e.g., disoriented, slurred speech)   Negative: Bluish (or gray) lips or face now   Negative: Shock suspected (e.g., cold/pale/clammy skin, too weak to stand, low BP, rapid pulse)   Negative: Sounds like a life-threatening emergency to the triager   Negative: Diagnosed or suspected COVID-19 and symptoms lasting 3 or more weeks   Negative: COVID-19 exposure and no symptoms   Negative: COVID-19 vaccine reaction suspected (e.g., fever, headache, muscle aches) occurring 1 to 3 days after getting vaccine   Negative: COVID-19 vaccine, questions about   Negative: Lives with someone known to have influenza (flu test positive) and flu-like symptoms (e.g., cough, runny nose, sore throat, SOB; with or without fever)   Negative: Possible COVID-19 symptoms and triager concerned about severity of symptoms or other causes   Negative: COVID-19 and breastfeeding, questions about   Negative: SEVERE or constant chest pain or pressure  (Exception: Mild central chest pain, present only when coughing.)   Negative: MODERATE difficulty breathing (e.g., speaks in phrases, SOB even at rest, pulse 100-120)   Negative: Headache and  stiff neck (can't touch chin to chest)   Negative: Oxygen level (e.g., pulse oximetry) 90% or lower   Negative: Chest pain or pressure  (Exception: MILD central chest pain, present only when coughing.)   Negative: Drinking very little and dehydration suspected (e.g., no urine > 12 hours, very dry mouth, very lightheaded)   Negative: Patient sounds very sick or weak to the triager   Negative: MILD difficulty breathing (e.g., minimal/no SOB at rest, SOB with walking, pulse <100)   Negative: Fever > 103 F (39.4 C)   Negative: Fever > 101 F (38.3 C) and over 60 years of age   Negative: Fever > 100.0 F (37.8 C) and bedridden (e.g., CVA, chronic illness, recovering from surgery)   Negative: HIGH RISK patient (e.g., weak immune system, age > 64 years, obesity with BMI of 30 or higher, pregnant, chronic lung disease or other chronic medical condition) and COVID symptoms (e.g., cough, fever)  (Exceptions: Already seen by doctor or NP/PA and no new or worsening symptoms.)   Negative: HIGH RISK patient and influenza is widespread in the community and ONE OR MORE respiratory symptoms: cough, sore throat, runny or stuffy nose   Negative: HIGH RISK patient and influenza exposure within the last 7 days and ONE OR MORE respiratory symptoms: cough, sore throat, runny or stuffy nose   Negative: Oxygen level (e.g., pulse oximetry) 91 to 94%   Negative: COVID-19 infection suspected by caller or triager and mild symptoms (cough, fever, or others) and negative COVID-19 rapid test   Negative: Fever present > 3 days (72 hours)   Negative: Fever returns after gone for over 24 hours and symptoms worse or not improved   Negative: Continuous (nonstop) coughing interferes with work or school and no improvement using cough treatment per Care Advice   Negative: Cough present > 3 weeks    Protocols used: Coronavirus (COVID-19) Diagnosed or [id number]

## 2024-04-04 ENCOUNTER — NURSE TRIAGE (OUTPATIENT)
Dept: NURSING | Facility: CLINIC | Age: 39
End: 2024-04-04
Payer: COMMERCIAL

## 2024-04-04 ENCOUNTER — MYC MEDICAL ADVICE (OUTPATIENT)
Dept: ONCOLOGY | Facility: CLINIC | Age: 39
End: 2024-04-04
Payer: COMMERCIAL

## 2024-04-04 DIAGNOSIS — C50.812 MALIGNANT NEOPLASM OF OVERLAPPING SITES OF LEFT BREAST IN FEMALE, ESTROGEN RECEPTOR POSITIVE (H): ICD-10-CM

## 2024-04-04 DIAGNOSIS — Z17.0 MALIGNANT NEOPLASM OF OVERLAPPING SITES OF LEFT BREAST IN FEMALE, ESTROGEN RECEPTOR POSITIVE (H): ICD-10-CM

## 2024-04-04 NOTE — TELEPHONE ENCOUNTER
Patient is requesting a refill for tamoxifen (NOLVADEX) 10 MG tablet , per pt she took last pill today. Per pt chart, medication was discontinued. Pt is informed to call Dr. Kenny office during office hours or send a [employer] message to provider. Pt verbalized understanding and agreement with plan of care and no further questions at this time.     Reason for Disposition   Prescription request for new medicine (not a refill)    Protocols used: Medication Refill and Renewal Call-A-

## 2024-04-05 RX ORDER — TAMOXIFEN CITRATE 10 MG/1
10 TABLET ORAL DAILY
Qty: 90 TABLET | Refills: 3 | Status: SHIPPED | OUTPATIENT
Start: 2024-04-05

## 2024-04-05 NOTE — CONFIDENTIAL NOTE
"Tamoxifen Refill   Last prescribing provider: Dr Kenny     Last clinic visit date: 1/22/24 Alicia Lenz     Recommendations for requested medication (if none, N/A): Copied from chart note   1/22/24 Alicia Hagen suffered terrible arthralgias from the AIs. She then went on Tamoxifen earlier this year. She did note initially that things were much better. However with time, she developed severe hot flashes, blurred vision, fatigue and mood changes. She messaged Dr Kenny in November and stopped taking it. It was recommended to take 10 mg, but she has not.    Since being off, she has had significant improvement and been feeling back to \"normal\". She did see an alternative MD and was given Black Cohosh and evening primrose. She feels they have helped stop her hot flashes and she feels ready to try the lower dose of Tamoxifen.     Any other pertinent information (if none, N/A): N/A    Refilled: Y/N, if NO, why?   "

## 2024-07-08 ENCOUNTER — NURSE TRIAGE (OUTPATIENT)
Dept: NURSING | Facility: CLINIC | Age: 39
End: 2024-07-08
Payer: COMMERCIAL

## 2024-07-08 ENCOUNTER — ANCILLARY PROCEDURE (OUTPATIENT)
Dept: CT IMAGING | Facility: CLINIC | Age: 39
End: 2024-07-08
Attending: EMERGENCY MEDICINE
Payer: COMMERCIAL

## 2024-07-08 ENCOUNTER — OFFICE VISIT (OUTPATIENT)
Dept: URGENT CARE | Facility: URGENT CARE | Age: 39
End: 2024-07-08
Payer: COMMERCIAL

## 2024-07-08 ENCOUNTER — OFFICE VISIT (OUTPATIENT)
Dept: PEDIATRICS | Facility: CLINIC | Age: 39
End: 2024-07-08
Payer: COMMERCIAL

## 2024-07-08 VITALS
SYSTOLIC BLOOD PRESSURE: 101 MMHG | OXYGEN SATURATION: 100 % | HEART RATE: 65 BPM | RESPIRATION RATE: 16 BRPM | TEMPERATURE: 98.6 F | DIASTOLIC BLOOD PRESSURE: 65 MMHG

## 2024-07-08 VITALS
DIASTOLIC BLOOD PRESSURE: 52 MMHG | SYSTOLIC BLOOD PRESSURE: 93 MMHG | OXYGEN SATURATION: 100 % | HEART RATE: 71 BPM | BODY MASS INDEX: 24.32 KG/M2 | RESPIRATION RATE: 18 BRPM | TEMPERATURE: 98.2 F | WEIGHT: 136.2 LBS

## 2024-07-08 DIAGNOSIS — R10.32 LLQ ABDOMINAL PAIN: Primary | ICD-10-CM

## 2024-07-08 DIAGNOSIS — K59.00 CONSTIPATION, UNSPECIFIED CONSTIPATION TYPE: ICD-10-CM

## 2024-07-08 DIAGNOSIS — K76.9 HEPATIC LESION: ICD-10-CM

## 2024-07-08 DIAGNOSIS — R10.32 LLQ ABDOMINAL PAIN: ICD-10-CM

## 2024-07-08 DIAGNOSIS — R10.30 LOWER ABDOMINAL PAIN: Primary | ICD-10-CM

## 2024-07-08 DIAGNOSIS — K57.90 DIVERTICULOSIS: ICD-10-CM

## 2024-07-08 LAB
ALBUMIN SERPL BCG-MCNC: 4.1 G/DL (ref 3.5–5.2)
ALBUMIN UR-MCNC: NEGATIVE MG/DL
ALP SERPL-CCNC: 44 U/L (ref 40–150)
ALT SERPL W P-5'-P-CCNC: 8 U/L (ref 0–50)
ANION GAP SERPL CALCULATED.3IONS-SCNC: 8 MMOL/L (ref 7–15)
APPEARANCE UR: CLEAR
AST SERPL W P-5'-P-CCNC: 23 U/L (ref 0–45)
BASOPHILS # BLD AUTO: 0 10E3/UL (ref 0–0.2)
BASOPHILS # BLD AUTO: 0 10E3/UL (ref 0–0.2)
BASOPHILS NFR BLD AUTO: 0 %
BASOPHILS NFR BLD AUTO: 0 %
BILIRUB SERPL-MCNC: 0.4 MG/DL
BILIRUB UR QL STRIP: NEGATIVE
BUN SERPL-MCNC: 9.1 MG/DL (ref 6–20)
CALCIUM SERPL-MCNC: 9.3 MG/DL (ref 8.6–10)
CHLORIDE SERPL-SCNC: 107 MMOL/L (ref 98–107)
COLOR UR AUTO: YELLOW
CREAT SERPL-MCNC: 0.6 MG/DL (ref 0.51–0.95)
DEPRECATED HCO3 PLAS-SCNC: 25 MMOL/L (ref 22–29)
EGFRCR SERPLBLD CKD-EPI 2021: >90 ML/MIN/1.73M2
EOSINOPHIL # BLD AUTO: 0.1 10E3/UL (ref 0–0.7)
EOSINOPHIL # BLD AUTO: 0.1 10E3/UL (ref 0–0.7)
EOSINOPHIL NFR BLD AUTO: 2 %
EOSINOPHIL NFR BLD AUTO: 2 %
ERYTHROCYTE [DISTWIDTH] IN BLOOD BY AUTOMATED COUNT: 12.4 % (ref 10–15)
ERYTHROCYTE [DISTWIDTH] IN BLOOD BY AUTOMATED COUNT: 12.5 % (ref 10–15)
GLUCOSE SERPL-MCNC: 112 MG/DL (ref 70–99)
GLUCOSE UR STRIP-MCNC: NEGATIVE MG/DL
HCG UR QL: NEGATIVE
HCT VFR BLD AUTO: 36.4 % (ref 35–47)
HCT VFR BLD AUTO: 37.9 % (ref 35–47)
HGB BLD-MCNC: 12.5 G/DL (ref 11.7–15.7)
HGB BLD-MCNC: 12.9 G/DL (ref 11.7–15.7)
HGB UR QL STRIP: NEGATIVE
IMM GRANULOCYTES # BLD: 0 10E3/UL
IMM GRANULOCYTES # BLD: 0 10E3/UL
IMM GRANULOCYTES NFR BLD: 0 %
IMM GRANULOCYTES NFR BLD: 0 %
KETONES UR STRIP-MCNC: NEGATIVE MG/DL
LEUKOCYTE ESTERASE UR QL STRIP: NEGATIVE
LIPASE SERPL-CCNC: 27 U/L (ref 13–60)
LYMPHOCYTES # BLD AUTO: 1.7 10E3/UL (ref 0.8–5.3)
LYMPHOCYTES # BLD AUTO: 2.1 10E3/UL (ref 0.8–5.3)
LYMPHOCYTES NFR BLD AUTO: 38 %
LYMPHOCYTES NFR BLD AUTO: 40 %
MCH RBC QN AUTO: 32.3 PG (ref 26.5–33)
MCH RBC QN AUTO: 32.7 PG (ref 26.5–33)
MCHC RBC AUTO-ENTMCNC: 34 G/DL (ref 31.5–36.5)
MCHC RBC AUTO-ENTMCNC: 34.3 G/DL (ref 31.5–36.5)
MCV RBC AUTO: 95 FL (ref 78–100)
MCV RBC AUTO: 95 FL (ref 78–100)
MONOCYTES # BLD AUTO: 0.3 10E3/UL (ref 0–1.3)
MONOCYTES # BLD AUTO: 0.4 10E3/UL (ref 0–1.3)
MONOCYTES NFR BLD AUTO: 7 %
MONOCYTES NFR BLD AUTO: 7 %
NEUTROPHILS # BLD AUTO: 2.4 10E3/UL (ref 1.6–8.3)
NEUTROPHILS # BLD AUTO: 2.6 10E3/UL (ref 1.6–8.3)
NEUTROPHILS NFR BLD AUTO: 50 %
NEUTROPHILS NFR BLD AUTO: 54 %
NITRATE UR QL: NEGATIVE
NRBC # BLD AUTO: 0 10E3/UL
NRBC BLD AUTO-RTO: 0 /100
PH UR STRIP: 5.5 [PH] (ref 5–7)
PLATELET # BLD AUTO: 208 10E3/UL (ref 150–450)
PLATELET # BLD AUTO: 213 10E3/UL (ref 150–450)
POTASSIUM SERPL-SCNC: 3.8 MMOL/L (ref 3.4–5.3)
PROT SERPL-MCNC: 6.8 G/DL (ref 6.4–8.3)
RBC # BLD AUTO: 3.82 10E6/UL (ref 3.8–5.2)
RBC # BLD AUTO: 4 10E6/UL (ref 3.8–5.2)
SODIUM SERPL-SCNC: 140 MMOL/L (ref 135–145)
SP GR UR STRIP: >=1.03 (ref 1–1.03)
UROBILINOGEN UR STRIP-ACNC: 0.2 E.U./DL
WBC # BLD AUTO: 4.5 10E3/UL (ref 4–11)
WBC # BLD AUTO: 5.2 10E3/UL (ref 4–11)

## 2024-07-08 PROCEDURE — 99417 PROLNG OP E/M EACH 15 MIN: CPT | Performed by: EMERGENCY MEDICINE

## 2024-07-08 PROCEDURE — 99215 OFFICE O/P EST HI 40 MIN: CPT | Performed by: EMERGENCY MEDICINE

## 2024-07-08 PROCEDURE — 83690 ASSAY OF LIPASE: CPT | Performed by: EMERGENCY MEDICINE

## 2024-07-08 PROCEDURE — 99207 REFERRAL TO ACUTE AND DIAGNOSTIC SERVICES: CPT | Performed by: STUDENT IN AN ORGANIZED HEALTH CARE EDUCATION/TRAINING PROGRAM

## 2024-07-08 PROCEDURE — 80053 COMPREHEN METABOLIC PANEL: CPT | Performed by: EMERGENCY MEDICINE

## 2024-07-08 PROCEDURE — 36415 COLL VENOUS BLD VENIPUNCTURE: CPT | Performed by: STUDENT IN AN ORGANIZED HEALTH CARE EDUCATION/TRAINING PROGRAM

## 2024-07-08 PROCEDURE — 81003 URINALYSIS AUTO W/O SCOPE: CPT | Performed by: STUDENT IN AN ORGANIZED HEALTH CARE EDUCATION/TRAINING PROGRAM

## 2024-07-08 PROCEDURE — 81025 URINE PREGNANCY TEST: CPT | Performed by: STUDENT IN AN ORGANIZED HEALTH CARE EDUCATION/TRAINING PROGRAM

## 2024-07-08 PROCEDURE — 85025 COMPLETE CBC W/AUTO DIFF WBC: CPT | Performed by: STUDENT IN AN ORGANIZED HEALTH CARE EDUCATION/TRAINING PROGRAM

## 2024-07-08 PROCEDURE — 74177 CT ABD & PELVIS W/CONTRAST: CPT | Mod: GC | Performed by: RADIOLOGY

## 2024-07-08 PROCEDURE — 85025 COMPLETE CBC W/AUTO DIFF WBC: CPT | Performed by: EMERGENCY MEDICINE

## 2024-07-08 RX ORDER — IOPAMIDOL 755 MG/ML
75 INJECTION, SOLUTION INTRAVASCULAR ONCE
Status: COMPLETED | OUTPATIENT
Start: 2024-07-08 | End: 2024-07-08

## 2024-07-08 RX ADMIN — IOPAMIDOL 75 ML: 755 INJECTION, SOLUTION INTRAVASCULAR at 12:36

## 2024-07-08 NOTE — PROGRESS NOTES
Assessment & Plan     Diagnosis:    ICD-10-CM    1. LLQ abdominal pain  R10.32 CBC with platelets differential     Comprehensive metabolic panel     Lipase     CT Abdomen Pelvis w Contrast     sodium chloride (PF) 0.9% PF flush 3 mL     CBC with platelets differential     Comprehensive metabolic panel     Lipase     HCG qualitative urine      2. Constipation, unspecified constipation type  K59.00       3. Hepatic lesion  K76.9         Medical Decision Making:  Xiao Beal is a 39 year old female who presents with LLQ abdominal pain.   She is currently not pregnant.  They look overall well and have a reassuring exam.  A broad differential diagnosis was considered including colitis, appendicitis, intestinal cramping,  pyelonephritis, UTI, kidney stone, constipation, diverticulitis, endometriosis, obstruction, ovarian cyst (enlarged or ruptured), ovarian torsion,  PID, TOA, as most likely possibilities.   The workup in the [facility] is at this point negative.  CT did demonstrate some prominent uterine veins ; which could signify pelvic congestion syndrome.  She has no concerns for STDs, PID. UPT negative. Labs unremarkable as noted below. Nno etiology for her pain is found at this point and my suspicion of an intraabdominal catastrophe or other worrisome etiology is very low.  I will not therefore admit for serial exams and further workup nor call gynecology.  Patient is hemodynamically stable.  There are liver lesions noted on the CT which she can follow-up with her oncology team to consider hepatic MRI.  Go to the ER for fevers, increasing pain, other new symptoms develop. She will try stool softeners and laxatives as she is constipated. Patient verbalizes understanding and agreement with the plan including reasons to go to the ER. All questions answered.     60 minutes spent by me on the date of the encounter doing chart review, review of outside records, review of test results, interpretation of tests,  patient visit, and documentation       Luis Alberto Bravo PA-C  [facility]    Subjective     Xiao Beal is a 39 year old female who presents to clinic today for the following health issues:  Chief Complaint   Patient presents with    Abdominal Pain     Possible diverticulitis flare up/lower abd. pain       HPI    Abdominal/Flank Pain  Onset/Duration: 7/5  Description:   Character: Dull ache  Location: pelvic region  Radiation: periumbilical area occasionally   Intensity: moderate, severe  Progression of Symptoms:  same and intermittent  Accompanying Signs & Symptoms:  Fever/chills: no   Gas/Bloating: YES  Nausea: YES  Vomiting: no   Diarrhea: yes but only 7/5   Constipation:YES  Dysuria: no            Hematuria: no            Frequency: no            Incontinence of urine: no   History:            Last bowel movement: today  Trauma: no   Previous similar pain: YES- H/O diverticulitis   Previous tests done: Colonoscopy           Previous Abdominal surgery: YES- C section  Precipitating factors:   Does the pain change with:     Food: no      Bowel Movement: YES- a little better     Urination: no              Other factors: no   Therapies tried and outcome:  Advil and Tylenol with only a little relief    When food last eaten: 7/7 7pm      Review of Systems    See HPI    Objective      Vitals: /65 (BP Location: Right arm, Patient Position: Sitting, Cuff Size: Adult Regular)   Pulse 65   Temp 98.6  F (37  C) (Oral)   Resp 16   SpO2 100%     Patient Vitals for the past 24 hrs:   BP Temp Temp src Pulse Resp SpO2   07/08/24 1146 101/65 98.6  F (37  C) Oral 65 16 100 %       Vital signs reviewed by: Luis Alberto Bravo PA-C    Physical Exam   Constitutional: Patient is alert and cooperative. No acute distress.  Cardiovascular: Regular rate and rhythm  Pulmonary/Chest: Lungs are clear to auscultation throughout. Effort normal. No respiratory distress. No wheezes, rales or rhonchi.  GI: Left lower  quadrant abdominal tenderness to palpation.  No rebound or guarding.  Bowel sounds hypoactive.  Neurological: Alert and oriented x3.  Skin: No rash noted on visualized skin.  Psychiatric:The patient has a normal mood and affect.     Labs/Imaging:  Results for orders placed or performed in visit on 07/08/24   CT Abdomen Pelvis w Contrast     Status: None    Narrative    EXAMINATION: CT ABDOMEN PELVIS W CONTRAST, 7/8/2024 12:42 PM    INDICATION: LLQ abdominal pain    COMPARISON STUDY: None    History from physician: History of breast cancer    TECHNIQUE: CT scan of the abdomen and pelvis was performed on  multidetector CT scanner using volumetric acquisition technique and  images were reconstructed in multiple planes with variable thickness  and reviewed on dedicated workstations.     CONTRAST: 75 isovue 370 injected IV without oral contrast    CT scan radiation dose is optimized to minimum requisite dose using  automated dose modulation techniques.    FINDINGS:    Lower thorax: Mild right middle lobe atelectasis.    Liver: There are several ill-defined hypodensities within the liver  for example within the left lobe (series 3 image 34) there is a 13 x 8  x 9 mm hypodensity. There are several additional subcentimeter  hypodensities seen in the left lobe (series 3 image 33, image 31).  There is mild hypodensity adjacent to the falciform ligament which may  represent focal fatty infiltration.    Biliary System: Normal gallbladder. No extrahepatic biliary ductal  dilation.    Pancreas: No mass or pancreatic ductal dilation.    Adrenal glands: No mass or nodules    Spleen: Normal.    Kidneys: No suspicious mass, obstructing calculus or hydronephrosis.    Gastrointestinal tract :Normal appendix. Normal caliber small bowel.   Colonic diverticulosis with no diverticulitis.    Mesentery/peritoneum/retroperitoneum: No mass. No free fluid or air.    Lymph nodes: No significant lymphadenopathy.    Vasculature: Patent major  abdominal vasculature.    Pelvis: Urinary bladder is normal.  Prominent parametrial and adnexal  veins, left greater than right. Left innominate vein also distended.    Osseous structures: No aggressive or acute osseous lesion.      Soft tissues: Slight diastases along the rectus abdominal muscles with  broad based bulging at the level of the umbilicus.      Impression    IMPRESSION:   1. No acute findings in the abdomen or pelvis. Normal appendix.  2. Several hypodensities present within the liver measuring up to 13  mm. Given history of breast cancer consider MR liver without and with  contrast for further evaluation.  3. Prominent uterine and ovarian veins which can be seen with pelvic  congestion syndrome.  4. Diverticulosis without evidence of diverticulitis.    Findings discussed with Dr. Bravo at 2:10 PM on 7/8/2024 by Dr. Prem Boykin.    I have personally reviewed the examination and initial interpretation  and I agree with the findings.    CURTIS HERNANDEZ MD         SYSTEM ID:  [id number]   Results for orders placed or performed in visit on 07/08/24   Comprehensive metabolic panel     Status: Abnormal   Result Value Ref Range    Sodium 140 135 - 145 mmol/L    Potassium 3.8 3.4 - 5.3 mmol/L    Carbon Dioxide (CO2) 25 22 - 29 mmol/L    Anion Gap 8 7 - 15 mmol/L    Urea Nitrogen 9.1 6.0 - 20.0 mg/dL    Creatinine 0.60 0.51 - 0.95 mg/dL    GFR Estimate >90 >60 mL/min/1.73m2    Calcium 9.3 8.6 - 10.0 mg/dL    Chloride 107 98 - 107 mmol/L    Glucose 112 (H) 70 - 99 mg/dL    Alkaline Phosphatase 44 40 - 150 U/L    AST 23 0 - 45 U/L    ALT 8 0 - 50 U/L    Protein Total 6.8 6.4 - 8.3 g/dL    Albumin 4.1 3.5 - 5.2 g/dL    Bilirubin Total 0.4 <=1.2 mg/dL   Lipase     Status: Normal   Result Value Ref Range    Lipase 27 13 - 60 U/L   CBC with platelets and differential     Status: None   Result Value Ref Range    WBC Count 4.5 4.0 - 11.0 10e3/uL    RBC Count 3.82 3.80 - 5.20 10e6/uL    Hemoglobin 12.5 11.7 -  15.7 g/dL    Hematocrit 36.4 35.0 - 47.0 %    MCV 95 78 - 100 fL    MCH 32.7 26.5 - 33.0 pg    MCHC 34.3 31.5 - 36.5 g/dL    RDW 12.5 10.0 - 15.0 %    Platelet Count 208 150 - 450 10e3/uL    % Neutrophils 54 %    % Lymphocytes 38 %    % Monocytes 7 %    % Eosinophils 2 %    % Basophils 0 %    % Immature Granulocytes 0 %    NRBCs per 100 WBC 0 <1 /100    Absolute Neutrophils 2.4 1.6 - 8.3 10e3/uL    Absolute Lymphocytes 1.7 0.8 - 5.3 10e3/uL    Absolute Monocytes 0.3 0.0 - 1.3 10e3/uL    Absolute Eosinophils 0.1 0.0 - 0.7 10e3/uL    Absolute Basophils 0.0 0.0 - 0.2 10e3/uL    Absolute Immature Granulocytes 0.0 <=0.4 10e3/uL    Absolute NRBCs 0.0 10e3/uL   CBC with platelets differential     Status: None    Narrative    The following orders were created for panel order CBC with platelets differential.  Procedure                               Abnormality         Status                     ---------                               -----------         ------                     CBC with platelets and d...[[id number]]                      Final result                 Please view results for these tests on the individual orders.   Results for orders placed or performed in visit on 07/08/24   UA Macroscopic with reflex to Microscopic and Culture - Lab Collect     Status: Normal    Specimen: Urine, Clean Catch   Result Value Ref Range    Color Urine Yellow Colorless, Straw, Light Yellow, Yellow    Appearance Urine Clear Clear    Glucose Urine Negative Negative mg/dL    Bilirubin Urine Negative Negative    Ketones Urine Negative Negative mg/dL    Specific Gravity Urine >=1.030 1.003 - 1.035    Blood Urine Negative Negative    pH Urine 5.5 5.0 - 7.0    Protein Albumin Urine Negative Negative mg/dL    Urobilinogen Urine 0.2 0.2, 1.0 E.U./dL    Nitrite Urine Negative Negative    Leukocyte Esterase Urine Negative Negative    Narrative    Microscopic not indicated   CBC with platelets and differential     Status: None   Result Value  Ref Range    WBC Count 5.2 4.0 - 11.0 10e3/uL    RBC Count 4.00 3.80 - 5.20 10e6/uL    Hemoglobin 12.9 11.7 - 15.7 g/dL    Hematocrit 37.9 35.0 - 47.0 %    MCV 95 78 - 100 fL    MCH 32.3 26.5 - 33.0 pg    MCHC 34.0 31.5 - 36.5 g/dL    RDW 12.4 10.0 - 15.0 %    Platelet Count 213 150 - 450 10e3/uL    % Neutrophils 50 %    % Lymphocytes 40 %    % Monocytes 7 %    % Eosinophils 2 %    % Basophils 0 %    % Immature Granulocytes 0 %    Absolute Neutrophils 2.6 1.6 - 8.3 10e3/uL    Absolute Lymphocytes 2.1 0.8 - 5.3 10e3/uL    Absolute Monocytes 0.4 0.0 - 1.3 10e3/uL    Absolute Eosinophils 0.1 0.0 - 0.7 10e3/uL    Absolute Basophils 0.0 0.0 - 0.2 10e3/uL    Absolute Immature Granulocytes 0.0 <=0.4 10e3/uL   CBC with platelets and differential     Status: None    Narrative    The following orders were created for panel order CBC with platelets and differential.  Procedure                               Abnormality         Status                     ---------                               -----------         ------                     CBC with platelets and d...[[id number]]                      Final result                 Please view results for these tests on the individual orders.         Luis Alberto Bravo PA-C, July 8, 2024

## 2024-07-08 NOTE — TELEPHONE ENCOUNTER
"Daughter Galina was given verbal consent.  Severe lower abdominal pain, constant. Varies in intensity. Has episodes of severe pain 10/10 which lasts for 2 minutes. Then has dull constant pain rated 5/10. Pain started at 11 PM  Bad diarrhea the past few days. Got a little better today.     Nausea, no vomiting  Took Advil and is not feeling better.    PLAN:  ED. Harshad bennett drive pt to [facility] or [facility] ED.    Emiliana Sexton RN/[place] Nurse Advisor          Reason for Disposition   [1] MILD-MODERATE pain AND [2] constant AND [3] present > 2 hours    Additional Information   Negative: Shock suspected (e.g., cold/pale/clammy skin, too weak to stand, low BP, rapid pulse)   Negative: Difficult to awaken or acting confused (e.g., disoriented, slurred speech)   Negative: Passed out (i.e., lost consciousness, collapsed and was not responding)   Negative: Sounds like a life-threatening emergency to the triager   Negative: Followed an abdomen (stomach) injury   Negative: Chest pain   Negative: [1] Abdominal pain AND [2] pregnant < 20 weeks   Negative: [1] Abdominal pain AND [2] pregnant 20 or more weeks   Negative: [1] Abdominal pain AND [2] postpartum (from 0 to 6 weeks after delivery)   Negative: Pain is mainly in upper abdomen  (if needed ask: \"is it mainly above the belly button?\")   Negative: Abdomen bloating or swelling are main symptoms   Negative: [1] SEVERE pain (e.g., excruciating) AND [2] present > 1 hour   Negative: [1] SEVERE pain AND [2] age > 60 years   Negative: [1] Vomiting AND [2] contains red blood or black (\"coffee ground\") material  (Exception: Few red streaks in vomit that only happened once.)   Negative: Blood in bowel movements  (Exception: Blood on surface of BM with constipation.)   Negative: Black or tarry bowel movements  (Exception: Chronic-unchanged black-grey BMs AND is taking iron pills or Pepto-Bismol.)   Negative: [1] Vomiting AND [2] contains bile (green color)   Negative: Patient " sounds very sick or weak to the triager    Protocols used: Abdominal Pain - Female-A-AH

## 2024-07-08 NOTE — PATIENT INSTRUCTIONS
You do not need to wait for the blood and urine results. The provider you see will get the results.    [phone] ([place]) [facility] [address] [address] [place] [id number]    Please do not eat anything before the appointment.

## 2024-07-08 NOTE — PATIENT INSTRUCTIONS
Follow up with PCP to discuss the liver mass. Miralax, increase fiber and fluids, laxatives as needed. Go to the ER with worsening pain, fevers or other concerns.

## 2024-07-08 NOTE — PROGRESS NOTES
Assessment & Plan     Lower abdominal pain  Diverticulosis  - CBC with platelets and differential  - UA Macroscopic with reflex to Microscopic and Culture - Lab Collect    Xiao Beal is a 39yr old female with HER+ neoplasm of the breast, on daily Tamoxifen, presenting with 2 days of intermittent 10/10 lower abdominal pain and diarrhea, worse with reclining.  On exam, she has suprapubic and left lower quadrant tenderness to palpation, no rebound tenderness or signs of acute abdomen.  UA negative and patient is postmenopausal so no concern for pregnancy.  Given history and results of 2021 colonoscopy showed multiple diverticulosis throughout entire colon, cannot rule out first flare acute diverticulitis.  Normal white blood cell count and afebrile which is reassuring against appendicitis.  She does have a history of constipation so possible that this is gas pains; passing stool and able to eat so partial or complete SBO unlikely.  After discussion with Xiao about imaging resources available at our site, she is open to [facility] referral.  Called and discussed with team and she will present for evaluation today.    Return to to the [facility].    Lamar Alexander, DO  she/her  [facility]    Subjective     Xiao Beal is a 39 year old female who presents to clinic today for the following health issues:    HPI    Abdominal Pain  Friday 10/10 pain 1-2 minutes in the lower abdomen.   Frequency: every 4-5 minutes  Diarrhea 1-2 days  Nausea without vomiting, chills last night  Able to eat  Urinating normally  No chance of pregnancy, lasts menses 1 year  No sick contacts  No change in diet, new restaurants, camping, travel  Exacerbated by: recumbency  Relieved by: oatmeal with tien seeds, Advil, sitting  No melena, fever  Surgical History: 2 c sections  Has constipation and has abd pain from that frequently  Has GERD, tried TUMS and Magnesium that helps  On Tamoxifen daily for breast cancer (dose decreased in  April)    Per  colonoscopy: severe, many large diverticulosis entire colon    Past Medical History:   Diagnosis Date    Abnormal glandular Papanicolaou smear of cervix     confusing info related to pap    Breast cancer (H)     History of colonic polyps 2021    Invasive ductal carcinoma of breast, left (H) 2021    Pain in joint, lower leg     Previous  delivery, antepartum condition or complication 2005     No Known Allergies  Current Outpatient Medications   Medication Sig Dispense Refill    B Complex Vitamins (B COMPLEX 50 PO) Take by mouth daily      BLACK COHOSH PO Take 60 mg by mouth 2 times daily      calcium carbonate 600 mg-vitamin D 400 units (CALTRATE) 600-400 MG-UNIT per tablet Take 1 tablet by mouth daily      EVENING PRIMROSE OIL PO Take 1,000 mg by mouth 2 times daily      fish oil-omega-3 fatty acids 500 MG capsule       Magnesium Oxide -Mg Supplement 500 MG TABS       multivitamin w/minerals (MULTI-VITAMIN) tablet Take 1 tablet by mouth daily      tamoxifen (NOLVADEX) 10 MG tablet Take 1 tablet (10 mg) by mouth daily 90 tablet 3    UNABLE TO FIND CDG EstroDIM 60 - BID      vitamin D3 (CHOLECALCIFEROL) 50 mcg (2000 units) tablet Take 1 tablet by mouth daily      triamcinolone (KENALOG) 0.5 % external ointment Apply 1 g topically 2 times daily 60 g 3     Current Facility-Administered Medications   Medication Dose Route Frequency Provider Last Rate Last Admin    sodium chloride (PF) 0.9% PF flush 3 mL  3 mL Intravenous q1 min prn           Referral to Acute and Diagnostic Services    [phone] ([place]) [address] [address]    Transition to Acute & Diagnostic Services Clinic has been discussed with patient, and she agrees with next level of care.   Patient understands that evaluation/treatment at [facility] typically takes significantly longer than in clinic/urgent care (>2 hours).  [facility]  Clinic has been contacted by provider/staff to confirm patient acceptance.         Special issues:      None                 The following provider has assessed this patient for intervention at [facility], and directed the patient for referral: Lamar Alexander DO               Review of Systems  Constitutional, HEENT, cardiovascular, pulmonary, gi and gu systems are negative, except as otherwise noted.      Objective    BP 93/52   Pulse 71   Temp 98.2  F (36.8  C) (Oral)   Resp 18   Wt 61.8 kg (136 lb 3.2 oz)   SpO2 100%   BMI 24.32 kg/m    Physical Exam  Vitals reviewed.   Constitutional:       Appearance: She is not ill-appearing or toxic-appearing.      Comments: Present with daughters   Cardiovascular:      Rate and Rhythm: Normal rate and regular rhythm.   Pulmonary:      Effort: Pulmonary effort is normal. No respiratory distress.      Breath sounds: Normal breath sounds. No wheezing, rhonchi or rales.   Abdominal:      General: Abdomen is flat.      Palpations: Abdomen is soft.      Tenderness: There is abdominal tenderness in the suprapubic area and left lower quadrant. There is no right CVA tenderness, left CVA tenderness, guarding or rebound. Negative signs include psoas sign.   Neurological:      Mental Status: She is alert.          Results for orders placed or performed in visit on 07/08/24   UA Macroscopic with reflex to Microscopic and Culture - Lab Collect     Status: Normal    Specimen: Urine, Clean Catch   Result Value Ref Range    Color Urine Yellow Colorless, Straw, Light Yellow, Yellow    Appearance Urine Clear Clear    Glucose Urine Negative Negative mg/dL    Bilirubin Urine Negative Negative    Ketones Urine Negative Negative mg/dL    Specific Gravity Urine >=1.030 1.003 - 1.035    Blood Urine Negative Negative    pH Urine 5.5 5.0 - 7.0    Protein Albumin Urine Negative Negative mg/dL    Urobilinogen Urine 0.2 0.2, 1.0 E.U./dL    Nitrite Urine Negative Negative    Leukocyte Esterase Urine  Negative Negative    Narrative    Microscopic not indicated   CBC with platelets and differential     Status: None   Result Value Ref Range    WBC Count 5.2 4.0 - 11.0 10e3/uL    RBC Count 4.00 3.80 - 5.20 10e6/uL    Hemoglobin 12.9 11.7 - 15.7 g/dL    Hematocrit 37.9 35.0 - 47.0 %    MCV 95 78 - 100 fL    MCH 32.3 26.5 - 33.0 pg    MCHC 34.0 31.5 - 36.5 g/dL    RDW 12.4 10.0 - 15.0 %    Platelet Count 213 150 - 450 10e3/uL    % Neutrophils 50 %    % Lymphocytes 40 %    % Monocytes 7 %    % Eosinophils 2 %    % Basophils 0 %    % Immature Granulocytes 0 %    Absolute Neutrophils 2.6 1.6 - 8.3 10e3/uL    Absolute Lymphocytes 2.1 0.8 - 5.3 10e3/uL    Absolute Monocytes 0.4 0.0 - 1.3 10e3/uL    Absolute Eosinophils 0.1 0.0 - 0.7 10e3/uL    Absolute Basophils 0.0 0.0 - 0.2 10e3/uL    Absolute Immature Granulocytes 0.0 <=0.4 10e3/uL   CBC with platelets and differential     Status: None    Narrative    The following orders were created for panel order CBC with platelets and differential.  Procedure                               Abnormality         Status                     ---------                               -----------         ------                     CBC with platelets and d...[[id number]]                      Final result                 Please view results for these tests on the individual orders.           The use of Dragon/PowerMic dictation services may have been used to construct the content in this note; any grammatical or spelling errors are non-intentional. Please contact the author of this note directly if you are in need of any clarification.

## 2024-07-10 ENCOUNTER — LAB (OUTPATIENT)
Dept: LAB | Facility: CLINIC | Age: 39
End: 2024-07-10
Payer: COMMERCIAL

## 2024-07-10 DIAGNOSIS — K76.9 LIVER LESION: ICD-10-CM

## 2024-07-10 DIAGNOSIS — Z00.00 ROUTINE GENERAL MEDICAL EXAMINATION AT A HEALTH CARE FACILITY: ICD-10-CM

## 2024-07-10 DIAGNOSIS — Z17.0 MALIGNANT NEOPLASM OF OVERLAPPING SITES OF LEFT BREAST IN FEMALE, ESTROGEN RECEPTOR POSITIVE (H): ICD-10-CM

## 2024-07-10 DIAGNOSIS — C50.812 MALIGNANT NEOPLASM OF OVERLAPPING SITES OF LEFT BREAST IN FEMALE, ESTROGEN RECEPTOR POSITIVE (H): ICD-10-CM

## 2024-07-10 LAB
ALBUMIN SERPL BCG-MCNC: 4.1 G/DL (ref 3.5–5.2)
ALP SERPL-CCNC: 45 U/L (ref 40–150)
ALT SERPL W P-5'-P-CCNC: 13 U/L (ref 0–50)
ANION GAP SERPL CALCULATED.3IONS-SCNC: 8 MMOL/L (ref 7–15)
AST SERPL W P-5'-P-CCNC: 19 U/L (ref 0–45)
BASOPHILS # BLD AUTO: 0 10E3/UL (ref 0–0.2)
BASOPHILS NFR BLD AUTO: 0 %
BILIRUB SERPL-MCNC: 0.2 MG/DL
BUN SERPL-MCNC: 12.5 MG/DL (ref 6–20)
CALCIUM SERPL-MCNC: 9.3 MG/DL (ref 8.6–10)
CHLORIDE SERPL-SCNC: 107 MMOL/L (ref 98–107)
CHOLEST SERPL-MCNC: 156 MG/DL
CREAT SERPL-MCNC: 0.65 MG/DL (ref 0.51–0.95)
DEPRECATED HCO3 PLAS-SCNC: 26 MMOL/L (ref 22–29)
EGFRCR SERPLBLD CKD-EPI 2021: >90 ML/MIN/1.73M2
EOSINOPHIL # BLD AUTO: 0.1 10E3/UL (ref 0–0.7)
EOSINOPHIL NFR BLD AUTO: 2 %
ERYTHROCYTE [DISTWIDTH] IN BLOOD BY AUTOMATED COUNT: 12.4 % (ref 10–15)
FASTING STATUS PATIENT QL REPORTED: YES
FASTING STATUS PATIENT QL REPORTED: YES
GLUCOSE SERPL-MCNC: 97 MG/DL (ref 70–99)
HCT VFR BLD AUTO: 36.1 % (ref 35–47)
HDLC SERPL-MCNC: 45 MG/DL
HGB BLD-MCNC: 12.4 G/DL (ref 11.7–15.7)
HOLD SPECIMEN: NORMAL
HOLD SPECIMEN: NORMAL
IMM GRANULOCYTES # BLD: 0 10E3/UL
IMM GRANULOCYTES NFR BLD: 0 %
LDLC SERPL CALC-MCNC: 88 MG/DL
LYMPHOCYTES # BLD AUTO: 1.9 10E3/UL (ref 0.8–5.3)
LYMPHOCYTES NFR BLD AUTO: 41 %
MCH RBC QN AUTO: 32.8 PG (ref 26.5–33)
MCHC RBC AUTO-ENTMCNC: 34.3 G/DL (ref 31.5–36.5)
MCV RBC AUTO: 96 FL (ref 78–100)
MONOCYTES # BLD AUTO: 0.3 10E3/UL (ref 0–1.3)
MONOCYTES NFR BLD AUTO: 7 %
NEUTROPHILS # BLD AUTO: 2.3 10E3/UL (ref 1.6–8.3)
NEUTROPHILS NFR BLD AUTO: 49 %
NONHDLC SERPL-MCNC: 111 MG/DL
PLATELET # BLD AUTO: 203 10E3/UL (ref 150–450)
POTASSIUM SERPL-SCNC: 4.2 MMOL/L (ref 3.4–5.3)
PROT SERPL-MCNC: 6.6 G/DL (ref 6.4–8.3)
RBC # BLD AUTO: 3.78 10E6/UL (ref 3.8–5.2)
SODIUM SERPL-SCNC: 141 MMOL/L (ref 135–145)
TRIGL SERPL-MCNC: 116 MG/DL
WBC # BLD AUTO: 4.6 10E3/UL (ref 4–11)

## 2024-07-10 PROCEDURE — 85025 COMPLETE CBC W/AUTO DIFF WBC: CPT

## 2024-07-10 PROCEDURE — 80061 LIPID PANEL: CPT

## 2024-07-10 PROCEDURE — 82105 ALPHA-FETOPROTEIN SERUM: CPT

## 2024-07-10 PROCEDURE — 82378 CARCINOEMBRYONIC ANTIGEN: CPT

## 2024-07-10 PROCEDURE — 80053 COMPREHEN METABOLIC PANEL: CPT

## 2024-07-10 PROCEDURE — 36415 COLL VENOUS BLD VENIPUNCTURE: CPT

## 2024-07-10 PROCEDURE — 86300 IMMUNOASSAY TUMOR CA 15-3: CPT

## 2024-07-11 DIAGNOSIS — Z17.0 MALIGNANT NEOPLASM OF OVERLAPPING SITES OF LEFT BREAST IN FEMALE, ESTROGEN RECEPTOR POSITIVE (H): Primary | ICD-10-CM

## 2024-07-11 DIAGNOSIS — K76.9 LIVER LESION: ICD-10-CM

## 2024-07-11 DIAGNOSIS — C50.812 MALIGNANT NEOPLASM OF OVERLAPPING SITES OF LEFT BREAST IN FEMALE, ESTROGEN RECEPTOR POSITIVE (H): Primary | ICD-10-CM

## 2024-07-12 ENCOUNTER — ANCILLARY PROCEDURE (OUTPATIENT)
Dept: MRI IMAGING | Facility: CLINIC | Age: 39
End: 2024-07-12
Attending: INTERNAL MEDICINE
Payer: COMMERCIAL

## 2024-07-12 DIAGNOSIS — K76.9 LIVER LESION: ICD-10-CM

## 2024-07-12 DIAGNOSIS — C50.812 MALIGNANT NEOPLASM OF OVERLAPPING SITES OF LEFT BREAST IN FEMALE, ESTROGEN RECEPTOR POSITIVE (H): ICD-10-CM

## 2024-07-12 DIAGNOSIS — Z17.0 MALIGNANT NEOPLASM OF OVERLAPPING SITES OF LEFT BREAST IN FEMALE, ESTROGEN RECEPTOR POSITIVE (H): ICD-10-CM

## 2024-07-12 LAB
AFP SERPL-MCNC: 7.3 NG/ML
CANCER AG15-3 SERPL-ACNC: 14 U/ML
CEA SERPL-MCNC: 0.9 NG/ML

## 2024-07-12 PROCEDURE — 74183 MRI ABD W/O CNTR FLWD CNTR: CPT | Mod: GC | Performed by: STUDENT IN AN ORGANIZED HEALTH CARE EDUCATION/TRAINING PROGRAM

## 2024-07-12 PROCEDURE — A9581 GADOXETATE DISODIUM INJ: HCPCS | Performed by: STUDENT IN AN ORGANIZED HEALTH CARE EDUCATION/TRAINING PROGRAM

## 2024-07-12 NOTE — CONSULTS
Outpatient IR Referral  07/12/24    Referring Provider: Dr. Cristy Kenny with Hem/Onc  IR Referral Request: lesional liver biopsy    Recommendations/Plan:    -MR liver is scheduled for later today. IR will plan to review on Monday once the official read is back. Pending these results, IR may/may not schedule a biopsy. If approved, IR will send an Epic message to referring provider.  -Case and imaging was reviewed with Dr. Prem Metzger MD.      Brief History:    Xiao Beal is a 39 year old female (older than stated age, likely in her 50s based on immigration) with history of left breast cancer and recent abdominal pain prompting a abdominal/pelvic CT. This revealed several ill-defined hypodensities, with the largest seen in the left lobe (series 3, image 34). Case and imaging was reviewed by Dr. Prem Metzger in IR, who recommends waiting for results of scheduled MRI.       Pertinent Medications:    Current Outpatient Medications   Medication Sig Dispense Refill    B Complex Vitamins (B COMPLEX 50 PO) Take by mouth daily      BLACK COHOSH PO Take 60 mg by mouth 2 times daily      calcium carbonate 600 mg-vitamin D 400 units (CALTRATE) 600-400 MG-UNIT per tablet Take 1 tablet by mouth daily      EVENING PRIMROSE OIL PO Take 1,000 mg by mouth 2 times daily      fish oil-omega-3 fatty acids 500 MG capsule       Magnesium Oxide -Mg Supplement 500 MG TABS       multivitamin w/minerals (MULTI-VITAMIN) tablet Take 1 tablet by mouth daily      tamoxifen (NOLVADEX) 10 MG tablet Take 1 tablet (10 mg) by mouth daily 90 tablet 3    triamcinolone (KENALOG) 0.5 % external ointment Apply 1 g topically 2 times daily 60 g 3    UNABLE TO FIND CDG EstroDIM 60 - BID      vitamin D3 (CHOLECALCIFEROL) 50 mcg (2000 units) tablet Take 1 tablet by mouth daily       Current Facility-Administered Medications   Medication Dose Route Frequency Provider Last Rate Last Admin    sodium chloride (PF) 0.9% PF flush 3 mL  3 mL Intravenous q1 min prn  "           Pertinent Imaging Reviewed:        Most Recent Labs:  Lab Results   Component Value Date    WBC 4.6 07/10/2024    WBC 5.3 03/18/2021     Lab Results   Component Value Date    RBC 3.78 07/10/2024    RBC 4.10 03/18/2021     Lab Results   Component Value Date    HGB 12.4 07/10/2024    HGB 13.1 03/18/2021     Lab Results   Component Value Date    HCT 36.1 07/10/2024    HCT 38.0 03/18/2021     Lab Results   Component Value Date     07/10/2024     03/18/2021    Last Comprehensive Metabolic Panel:  Lab Results   Component Value Date     07/10/2024    POTASSIUM 4.2 07/10/2024    CHLORIDE 107 07/10/2024    CO2 26 07/10/2024    ANIONGAP 8 07/10/2024    GLC 97 07/10/2024    BUN 12.5 07/10/2024    CR 0.65 07/10/2024    GFRESTIMATED >90 07/10/2024    NAE 9.3 07/10/2024      No results found for: \"INR\"       If requesting team would like sample sent for anything else please use the IR order set \"IR RAD Biopsy or Fluid Aspirate Specimens\" to select your necessary diagnostic labs and pend for admission.     If there are labs you desire that are not found in this order set or you have questions regarding specific diagnostic labs please call the associated lab personnel.     Authorizing: Cristy Kenny MD in [facility]     Referral: [id number] (Pending Review)       Expires: 10/11/2024 Priority: Routine: Next available opening   Assign to: BETTY HENDERSON     Diagnosis: Malignant neoplasm of overlapping sites of left breast in female, estrogen recep...  Liver lesion [K76.9]     Order Specific Questions   What is the reason for the IR order request?   Biopsy            What type of biopsy is needed?   Liver            Type of Biopsy?   Lesion            Laterality?   Radiologist Choice            Additional laterality information?         Patients clinical information/history?   liver biopsy, lesions in liver, history of breast cancer            Is this biopsy procedure for research?   No    "         Specimen orders should be placed per site protocol   Acknowledge            Is there a specific location the exam needs to be scheduled at?   No specific location required            Call Back #   [phone]            Is the patient pregnant?         Is the patient on aspirin, Plavix or blood thinners?   No            Tamie Mcfadden PA-C  Interventional Radiology   [phone] (IR RN triage)

## 2024-07-12 NOTE — DISCHARGE INSTRUCTIONS
MRI Contrast Discharge Instructions    The IV contrast you received today will pass out of your body in your  urine. This will happen in the next 24 hours. You will not feel this process.  Your urine will not change color.    Drink at least 4 extra glasses of water or juice today (unless your doctor  has restricted your fluids). This reduces the stress on your kidneys.  You may take your regular medicines.    If you are on dialysis: It is best to have dialysis today.    If you have a reaction: Most reactions happen right away. If you have  any new symptoms after leaving the hospital (such as hives or swelling),  call your hospital at the correct number below. Or call your family doctor.  If you have breathing distress or wheezing, call 911.    Special instructions: ***    I have read and understand the above information.    Signature:______________________________________ Date:___________    Staff:__________________________________________ Date:___________     Time:__________    [facility] Departments:    ___[place]: [phone]  ___[facility]: [phone]  ___[place]: [phone] ___[facility]: [phone]  ___[facility]: [phone]  ___[facility]: [phone]  ___[phone]  ___[facility]: [phone]  ___[phone]

## 2024-07-18 ENCOUNTER — TELEPHONE (OUTPATIENT)
Dept: ONCOLOGY | Facility: CLINIC | Age: 39
End: 2024-07-18
Payer: COMMERCIAL

## 2024-07-18 DIAGNOSIS — K76.9 LIVER LESION: ICD-10-CM

## 2024-07-18 DIAGNOSIS — C50.812 MALIGNANT NEOPLASM OF OVERLAPPING SITES OF LEFT BREAST IN FEMALE, ESTROGEN RECEPTOR POSITIVE (H): Primary | ICD-10-CM

## 2024-07-18 DIAGNOSIS — Z17.0 MALIGNANT NEOPLASM OF OVERLAPPING SITES OF LEFT BREAST IN FEMALE, ESTROGEN RECEPTOR POSITIVE (H): Primary | ICD-10-CM

## 2024-07-18 NOTE — TELEPHONE ENCOUNTER
I called Xiao and reviewed her liver MRI.  There appears to be a hemangioma.      She has follow up with Carmen FERMIN in August and then will see me in December with liver mri.    Cristy Kenny MD

## 2024-09-05 ENCOUNTER — ONCOLOGY VISIT (OUTPATIENT)
Dept: ONCOLOGY | Facility: CLINIC | Age: 39
End: 2024-09-05
Attending: INTERNAL MEDICINE
Payer: COMMERCIAL

## 2024-09-05 DIAGNOSIS — C50.812 MALIGNANT NEOPLASM OF OVERLAPPING SITES OF LEFT BREAST IN FEMALE, ESTROGEN RECEPTOR POSITIVE (H): Primary | ICD-10-CM

## 2024-09-05 DIAGNOSIS — Z17.0 MALIGNANT NEOPLASM OF OVERLAPPING SITES OF LEFT BREAST IN FEMALE, ESTROGEN RECEPTOR POSITIVE (H): Primary | ICD-10-CM

## 2024-09-05 NOTE — PROGRESS NOTES
Xiao did not come in for her appt this morning.      37y/o female with a pT2N1a left breast cancer s/p lumpectomy and SNLB.  The tumor is ER + essentially FL negative, her2 negative. She was previously on letrozole, but had significant hot flashes and joint pain and was switched to anastrozole (1/2022).  She is now on tamoxifen (2023)    -Repeat mammogram in January.    -Liver lesion consistent with benign lesions, likely vascular in etiology.  Repeat imaging in [place] Kenny MD

## 2024-09-05 NOTE — LETTER
9/5/2024      Xiao Beal  [address]  [place] [address]      Dear Colleague,    Thank you for referring your patient, Xiao Beal, to the [facility]. Please see a copy of my visit note below.    Xiao did not come in for her appt this morning.      39y/o female with a pT2N1a left breast cancer s/p lumpectomy and SNLB.  The tumor is ER + essentially MT negative, her2 negative. She was previously on letrozole, but had significant hot flashes and joint pain and was switched to anastrozole (1/2022).  She is now on tamoxifen (2023)    -Repeat mammogram in January.    -Liver lesion consistent with benign lesions, likely vascular in etiology.  Repeat imaging in [place] Kenny MD       Again, thank you for allowing me to participate in the care of your patient.        Sincerely,        Cristy Kenny MD

## 2024-10-03 ENCOUNTER — TRANSFERRED RECORDS (OUTPATIENT)
Dept: HEALTH INFORMATION MANAGEMENT | Facility: CLINIC | Age: 39
End: 2024-10-03
Payer: COMMERCIAL

## 2024-10-17 ENCOUNTER — TRANSFERRED RECORDS (OUTPATIENT)
Dept: HEALTH INFORMATION MANAGEMENT | Facility: CLINIC | Age: 39
End: 2024-10-17
Payer: COMMERCIAL

## 2024-10-29 ENCOUNTER — PATIENT OUTREACH (OUTPATIENT)
Dept: ONCOLOGY | Facility: CLINIC | Age: 39
End: 2024-10-29
Payer: COMMERCIAL

## 2024-10-29 NOTE — PROGRESS NOTES
[facility]: Cancer Care                                                                                          Incoming Call:   Voicemail requesting a call to assist in scheduling      Outgoing Call:     Returned call and left voicemail message that she is scheduled with Dr Kenny in Jan. The Mammogram will likely need to be rescheduled, it is too early for insurance coverage. Message sent to scheduling. Requested a call back if she has additional questions      Samreen Gaona MSN, RN ,OCN  Lead RN Care Coordinator - [facility]  [phone]

## 2024-11-27 ENCOUNTER — ANCILLARY PROCEDURE (OUTPATIENT)
Dept: BONE DENSITY | Facility: CLINIC | Age: 39
End: 2024-11-27
Attending: PHYSICIAN ASSISTANT
Payer: COMMERCIAL

## 2024-11-27 DIAGNOSIS — Z78.0 POSTMENOPAUSAL STATUS: ICD-10-CM

## 2024-11-27 DIAGNOSIS — M85.80 OSTEOPENIA, UNSPECIFIED LOCATION: ICD-10-CM

## 2024-11-27 PROCEDURE — 77080 DXA BONE DENSITY AXIAL: CPT | Performed by: INTERNAL MEDICINE

## 2024-12-02 ENCOUNTER — MYC MEDICAL ADVICE (OUTPATIENT)
Dept: ONCOLOGY | Facility: CLINIC | Age: 39
End: 2024-12-02
Payer: COMMERCIAL

## 2024-12-02 ENCOUNTER — TELEPHONE (OUTPATIENT)
Dept: ONCOLOGY | Facility: CLINIC | Age: 39
End: 2024-12-02
Payer: COMMERCIAL

## 2024-12-02 NOTE — TELEPHONE ENCOUNTER
I tried calling pt to discuss her liver MRI.  I was unable to reach her or leave a message.    I would like to review her liver mri with colleagues.  It is not clear if lesion is too small to biopsy.    Cristy Kenny

## 2024-12-03 NOTE — CONFIDENTIAL NOTE
Patient requesting call back from DR Kenny.   Wants to have biopsy as soon as possible, she has met her deductible for this year so if anything needs to be done she would like it done this year if possible.   Patient states she is stressed a little bit and would like a call back.

## 2024-12-04 ENCOUNTER — PATIENT OUTREACH (OUTPATIENT)
Dept: ONCOLOGY | Facility: CLINIC | Age: 39
End: 2024-12-04
Payer: COMMERCIAL

## 2024-12-04 NOTE — PROGRESS NOTES
[facility]: Cancer Care                                                                                          Left voicemail message stating her imaging will be reviewed at the Liver Tumor Conference on Friday for recommendations. We will update her with plan once we hear the recommendations.     Samreen Gaona MSN, RN ,OCN  Lead RN Care Coordinator - [facility]  [phone]

## 2024-12-10 ENCOUNTER — MYC MEDICAL ADVICE (OUTPATIENT)
Dept: ONCOLOGY | Facility: CLINIC | Age: 39
End: 2024-12-10
Payer: COMMERCIAL

## 2024-12-10 DIAGNOSIS — K76.9 LIVER LESION: ICD-10-CM

## 2024-12-10 DIAGNOSIS — C50.812 MALIGNANT NEOPLASM OF OVERLAPPING SITES OF LEFT BREAST IN FEMALE, ESTROGEN RECEPTOR POSITIVE (H): Primary | ICD-10-CM

## 2024-12-10 DIAGNOSIS — Z17.0 MALIGNANT NEOPLASM OF OVERLAPPING SITES OF LEFT BREAST IN FEMALE, ESTROGEN RECEPTOR POSITIVE (H): Primary | ICD-10-CM

## 2024-12-16 DIAGNOSIS — Z17.0 MALIGNANT NEOPLASM OF OVERLAPPING SITES OF LEFT BREAST IN FEMALE, ESTROGEN RECEPTOR POSITIVE (H): ICD-10-CM

## 2024-12-16 DIAGNOSIS — C50.812 MALIGNANT NEOPLASM OF OVERLAPPING SITES OF LEFT BREAST IN FEMALE, ESTROGEN RECEPTOR POSITIVE (H): ICD-10-CM

## 2024-12-16 DIAGNOSIS — K76.9 LIVER LESION: ICD-10-CM

## 2024-12-16 NOTE — CONSULTS
Outpatient IR Referral   12/16/24    Referring Provider: Dr. Kenny  IR Referral Request: Liver lesion biopsy   Recommendations/Plan:  IR recommends US of liver lesions noted on MRI for biopsy planning. Lesions not well visualized on CT.     Patient not on AC at time of referral.     Patient discussed at Liver Conference on 12/13/24.      Imaging reviewed including previous CT scan and MRIs. Liver lesion is still indeterminate but with imaging characteristics and growth concern of metastatic disease.      Recommend: Recommend proceeding with IR biopsy at this time d/t concerns - recommend getting an Abd US to determine if lesion can be seen. IR MD present Dr. Yan.    Case and imaging was reviewed with Dr. Sebastien Mendoza MD.    IR recommendations also relayed Epic messaging.    IR referral converted to consult note.    Brief History:    Xiao Beal is a 39 year old female with history of breast cancer s/p left lumpectomy. Imaging for abdominal pain noted liver lesions, consult for liver lesion biopsy.     Pertinent Medications:    Current Outpatient Medications   Medication Sig Dispense Refill    B Complex Vitamins (B COMPLEX 50 PO) Take by mouth daily      BLACK COHOSH PO Take 60 mg by mouth 2 times daily      calcium carbonate 600 mg-vitamin D 400 units (CALTRATE) 600-400 MG-UNIT per tablet Take 1 tablet by mouth daily      EVENING PRIMROSE OIL PO Take 1,000 mg by mouth 2 times daily      fish oil-omega-3 fatty acids 500 MG capsule       Magnesium Oxide -Mg Supplement 500 MG TABS       multivitamin w/minerals (MULTI-VITAMIN) tablet Take 1 tablet by mouth daily      tamoxifen (NOLVADEX) 10 MG tablet Take 1 tablet (10 mg) by mouth daily 90 tablet 3    triamcinolone (KENALOG) 0.5 % external ointment Apply 1 g topically 2 times daily 60 g 3    UNABLE TO FIND CDG EstroDIM 60 - BID      vitamin D3 (CHOLECALCIFEROL) 50 mcg (2000 units) tablet Take 1 tablet by mouth daily       Current Facility-Administered  Medications   Medication Dose Route Frequency Provider Last Rate Last Admin    sodium chloride (PF) 0.9% PF flush 3 mL  3 mL Intravenous q1 min prn            Pertinent Imaging Reviewed:  MRI 11/27/24                 Liver: Noncirrhotic morphology of the liver. No evidence of  significant iron or fat deposition.     -Interval increased size of a subtly peripherally enhancing lesion  located within hepatic segment 4A/4B adjacent to the left hepatic vein  without hepatobiliary phase uptake measuring 8 x 8 mm (1004/22),  previously measuring 5 x 4 mm on prior MRI. This nodule appears to be  part of a previously described curvilinear T1 hypointense T2  hyperintense observation within hepatic segment 4A/4B.   -Unchanged indeterminate tiny hypointensities on the hepatobiliary  phase within hepatic segment 6/7 (1004/25) and segment 8 (74917/14)  with newly appreciable increased associated signal on high B value  diffusion-weighted images (1038/15 and 10).  -Similar subcapsular arterial phase enhancement within hepatic segment  8 and 6 which dissipate on further delayed sequences, compatible with  transient hepatic intensity differences.   -T2 hyperintense cyst within subcapsular hepatic segment 8, unchanged            Liver: There are several ill-defined hypodensities within the liver  for example within the left lobe (series 3 image 34) there is a 13 x 8  x 9 mm hypodensity. There are several additional subcentimeter  hypodensities seen in the left lobe (series 3 image 33, image 31).  There is mild hypodensity adjacent to the falciform ligament which may  represent focal fatty infiltration.    IMPRESSION:   1. No acute findings in the abdomen or pelvis. Normal appendix.  2. Several hypodensities present within the liver measuring up to 13  mm. Given history of breast cancer consider MR liver without and with  contrast for further evaluation.  3. Prominent uterine and ovarian veins which can be seen with pelvic  congestion  "syndrome.  4. Diverticulosis without evidence of diverticulitis      Most Recent Labs:  Lab Results   Component Value Date    WBC 4.6 07/10/2024    WBC 5.3 03/18/2021     Lab Results   Component Value Date    RBC 3.78 07/10/2024    RBC 4.10 03/18/2021     Lab Results   Component Value Date    HGB 12.4 07/10/2024    HGB 13.1 03/18/2021     Lab Results   Component Value Date    HCT 36.1 07/10/2024    HCT 38.0 03/18/2021     Lab Results   Component Value Date     07/10/2024     03/18/2021    Last Comprehensive Metabolic Panel:  Lab Results   Component Value Date     07/10/2024    POTASSIUM 4.2 07/10/2024    CHLORIDE 107 07/10/2024    CO2 26 07/10/2024    ANIONGAP 8 07/10/2024    GLC 97 07/10/2024    BUN 12.5 07/10/2024    CR 0.65 07/10/2024    GFRESTIMATED >90 07/10/2024    NAE 9.3 07/10/2024      No results found for: \"INR\"       If requesting team would like sample sent for anything else please use the IR order set \"IR RAD Biopsy or Fluid Aspirate Specimens\" to select your necessary diagnostic labs and pend for admission.     If there are labs you desire that are not found in this order set or you have questions regarding specific diagnostic labs please call the associated lab personnel.       MAXWELL Clemens CNP  Interventional Radiology   [phone] (IR RN triage)     ADDENDUM 12/18/24                  US IMPRESSION:   A 8 mm hypoechoic lesion within the left hepatic lobe likely  corresponds to the indeterminant observation seen on recent MRI.    Images reviewed with Dr. Mendoza and Dr. Blake who do not recommend biopsy as the lesions are difficult to identify, this would be a challenging biopsy with low yield non diagnostic or false negative results, and is very high risk for bleeding.     Please let IR know if you'd like to proceed with biopsy attempt given IR recommendations. IR can attempt if benefits out weigh the risks.    Nora ARREOLA [facility]  Interventional Radiology "

## 2024-12-17 ENCOUNTER — PATIENT OUTREACH (OUTPATIENT)
Dept: ONCOLOGY | Facility: CLINIC | Age: 39
End: 2024-12-17
Payer: COMMERCIAL

## 2024-12-17 NOTE — PROGRESS NOTES
[facility]: Cancer Care                                                                                          Incoming Call:   Voicemail asking for assistance in scheduling biopsy and US.      Outgoing Call:     Left voicemail that US has been scheduled for tomorrow 12/18, IR will contact her to schedule the biopsy after they see the US results.       Samreen Gaona MSN, RN ,OCN  Lead RN Care Coordinator - [facility] Cancer [facility]  [phone]

## 2024-12-18 ENCOUNTER — ANCILLARY PROCEDURE (OUTPATIENT)
Dept: ULTRASOUND IMAGING | Facility: CLINIC | Age: 39
End: 2024-12-18
Attending: INTERNAL MEDICINE
Payer: COMMERCIAL

## 2024-12-18 DIAGNOSIS — K76.9 LIVER LESION: ICD-10-CM

## 2024-12-18 DIAGNOSIS — C50.812 MALIGNANT NEOPLASM OF OVERLAPPING SITES OF LEFT BREAST IN FEMALE, ESTROGEN RECEPTOR POSITIVE (H): ICD-10-CM

## 2024-12-18 DIAGNOSIS — Z17.0 MALIGNANT NEOPLASM OF OVERLAPPING SITES OF LEFT BREAST IN FEMALE, ESTROGEN RECEPTOR POSITIVE (H): ICD-10-CM

## 2024-12-18 PROCEDURE — 76705 ECHO EXAM OF ABDOMEN: CPT | Mod: GC | Performed by: STUDENT IN AN ORGANIZED HEALTH CARE EDUCATION/TRAINING PROGRAM

## 2024-12-19 DIAGNOSIS — Z17.0 MALIGNANT NEOPLASM OF OVERLAPPING SITES OF LEFT BREAST IN FEMALE, ESTROGEN RECEPTOR POSITIVE (H): Primary | ICD-10-CM

## 2024-12-19 DIAGNOSIS — K76.9 LIVER LESION: ICD-10-CM

## 2024-12-19 DIAGNOSIS — C50.812 MALIGNANT NEOPLASM OF OVERLAPPING SITES OF LEFT BREAST IN FEMALE, ESTROGEN RECEPTOR POSITIVE (H): Primary | ICD-10-CM

## 2025-01-27 ENCOUNTER — ANCILLARY PROCEDURE (OUTPATIENT)
Dept: MAMMOGRAPHY | Facility: CLINIC | Age: 40
End: 2025-01-27
Attending: INTERNAL MEDICINE
Payer: COMMERCIAL

## 2025-01-27 ENCOUNTER — ONCOLOGY VISIT (OUTPATIENT)
Dept: ONCOLOGY | Facility: CLINIC | Age: 40
End: 2025-01-27
Attending: INTERNAL MEDICINE
Payer: COMMERCIAL

## 2025-01-27 VITALS
DIASTOLIC BLOOD PRESSURE: 72 MMHG | SYSTOLIC BLOOD PRESSURE: 112 MMHG | WEIGHT: 138.9 LBS | OXYGEN SATURATION: 99 % | BODY MASS INDEX: 24.8 KG/M2 | HEART RATE: 80 BPM | TEMPERATURE: 98.3 F | RESPIRATION RATE: 14 BRPM

## 2025-01-27 DIAGNOSIS — C50.912 INVASIVE DUCTAL CARCINOMA OF BREAST, LEFT (H): Primary | ICD-10-CM

## 2025-01-27 DIAGNOSIS — Z17.0 MALIGNANT NEOPLASM OF OVERLAPPING SITES OF LEFT BREAST IN FEMALE, ESTROGEN RECEPTOR POSITIVE (H): ICD-10-CM

## 2025-01-27 DIAGNOSIS — C50.812 MALIGNANT NEOPLASM OF OVERLAPPING SITES OF LEFT BREAST IN FEMALE, ESTROGEN RECEPTOR POSITIVE (H): ICD-10-CM

## 2025-01-27 DIAGNOSIS — K76.9 LIVER LESION: ICD-10-CM

## 2025-01-27 PROCEDURE — 99214 OFFICE O/P EST MOD 30 MIN: CPT | Performed by: INTERNAL MEDICINE

## 2025-01-27 PROCEDURE — 77067 SCR MAMMO BI INCL CAD: CPT | Performed by: RADIOLOGY

## 2025-01-27 PROCEDURE — 99213 OFFICE O/P EST LOW 20 MIN: CPT | Performed by: INTERNAL MEDICINE

## 2025-01-27 PROCEDURE — 77063 BREAST TOMOSYNTHESIS BI: CPT | Performed by: RADIOLOGY

## 2025-01-27 RX ORDER — TAMOXIFEN CITRATE 10 MG/1
5 TABLET ORAL DAILY
Qty: 90 TABLET | Refills: 3 | Status: SHIPPED | OUTPATIENT
Start: 2025-01-27

## 2025-01-27 ASSESSMENT — PAIN SCALES - GENERAL: PAINLEVEL_OUTOF10: NO PAIN (0)

## 2025-01-27 NOTE — LETTER
1/27/2025      Xiao Beal  [address]  [place] [address]      Dear Colleague,    Thank you for referring your patient, Xiao Beal, to the [facility]. Please see a copy of my visit note below.    Jan 27, 2025    Medical Oncology Follow-up    Reason for visit: left breast cancer     HPI:  This is a 39 y.o. Female (older than her stated age likely in her 50s based on immigration and change of age), with T2N1a left breast cancer, ER positive. This was picked up by the patient. She noticed an indentation or dimpling of her breast several months ago. She brought it up to her physician when she was seen because of neck and arm pain after a Covid vaccine. She underwent a mammogram and ultrasound. A needle biopsy was done which shows an invasive ductal carcinoma. It is estrogen receptor positive 95%, progesterone receptor positive weakly 3% and HER-2 negative 0 by IHC.  R breast revealed two fibroadenomas.  L breast - 11x13 mm mass in the 1 oclock position 9 cm from the nipple.  Lymph nodes appeared normal.       She met with Dr Kent.  She underwent a lumpectomy with SNLB.      Oncotype Dx score returned at 17 giving her a risk of metastatic disease of 15% at 9 years.  No benefit from chemotherapy. I recommended adjuvant radiation and adjuvant endocrine therapy. Lab work confirmed postmenopausal (she told me her age is not accurate in the computer).     She received 5040 cGy in 28 treatments targeted to the left breast/chest wall and regional lymph nodes followed by an additional 1000 cGy in 5 fractions to the primary tumor bed utilizing electrons. Her radiation therapy was from 4/12/2021-6/2/2021 under the care of Dr. Iza Espinoza at [facility].     Interval History:   Xiao suffered terrible arthralgias from the AIs.   She then went on Tamoxifen.  She developed significant hot flashes.  She ultimately stopped tamoxifen.   She then restarted it on a lower dose.    She was seen in the Er  summer 2024.  Incidental liver lesion was visualized.  This has been getting followed.  Close follow up with repeat imaging was recommended.  Biopsy could not be performed.    Lives at home with two daughters at the .  Working as an EEG technologist.      Of note, her birthday is [birth year], she is 49 yo.  She has been post menopausal for a few years.     ROS: 10 point ROS neg other than the symptoms noted above in the HPI.      Past Medical History:   Diagnosis Date    Abnormal glandular Papanicolaou smear of cervix     confusing info related to pap    Breast cancer (H)     History of colonic polyps 2021    Invasive ductal carcinoma of breast, left (H) 2021    Pain in joint, lower leg     Previous  delivery, antepartum condition or complication 2005     Current Outpatient Medications   Medication Sig Dispense Refill    B Complex Vitamins (B COMPLEX 50 PO) Take by mouth daily      BLACK COHOSH PO Take 60 mg by mouth 2 times daily      calcium carbonate 600 mg-vitamin D 400 units (CALTRATE) 600-400 MG-UNIT per tablet Take 1 tablet by mouth daily      EVENING PRIMROSE OIL PO Take 1,000 mg by mouth 2 times daily      fish oil-omega-3 fatty acids 500 MG capsule       Magnesium Oxide -Mg Supplement 500 MG TABS       multivitamin w/minerals (MULTI-VITAMIN) tablet Take 1 tablet by mouth daily      tamoxifen (NOLVADEX) 10 MG tablet Take 1 tablet (10 mg) by mouth daily 90 tablet 3    triamcinolone (KENALOG) 0.5 % external ointment Apply 1 g topically 2 times daily 60 g 3    UNABLE TO FIND CDG EstroDIM 60 - BID      vitamin D3 (CHOLECALCIFEROL) 50 mcg (2000 units) tablet Take 1 tablet by mouth daily       Current Facility-Administered Medications   Medication Dose Route Frequency Provider Last Rate Last Admin    sodium chloride (PF) 0.9% PF flush 3 mL  3 mL Intravenous q1 min prn            PHYSICAL EXAM:  /72 (BP Location: Right arm, Patient Position: Sitting, Cuff Size: Adult Regular)   Pulse  80   Temp 98.3  F (36.8  C) (Oral)   Resp 14   Wt 63 kg (138 lb 14.4 oz)   SpO2 99%   BMI 24.80 kg/m     Gen: Alert and oriented. No distress, pleasant  Lungs: CTAB, normal work of breathing on room air   Cards: RRR  GI: normal bowel sounds, nontender to palpation   Breast: Left lower breast more firm inferiorly than right, healed left lumpectomy incision, otherwise appears symmetrical   Neuro: No focal neural deficits, moving all extremities, ambulating independently   Psych: Euthymic, appropriate mood and affect    Narrative & Impression   BILATERAL FULL FIELD DIGITAL SCREENING MAMMOGRAM WITH TOMOSYNTHESIS     Performed on: 1/22/24     Compared to: 01/05/2023, 12/15/2021, and 01/20/2021     Technique:  This study was evaluated with the assistance of Computer-Aided Detection.  Breast Tomosynthesis was used in interpretation.     Findings: The breasts have scattered areas of fibroglandular density.  There are post-surgical changes in the left breast.  There is no radiographic evidence of malignancy.                                                                    IMPRESSION: ACR BI-RADS Category 2: Benign     BREAST CANCER SCREENING RECOMMENDATION: Routine yearly mammography beginning at age 40 or as discussed with your provider.     Abd US:                                                                     IMPRESSION:   A 8 mm hypoechoic lesion within the left hepatic lobe likely  corresponds to the indeterminant observation seen on recent MRI.    Reviewed today's mammogram with radiology; this was benign.    A/P:  39 (51y/o) female with a pT2N1a left breast cancer s/p lumpectomy and SNLB.  The tumor is ER + essentially TN negative, her2 negative. She was previously on letrozole, but had significant hot flashes and joint pain and was switched to anastrozole (1/2022).  Started on tamoxifen (early 2023), stopped in Nov 2023 and now ready to try a smaller dose.     Left breast cancer: S/p lumpectomy + SLNB s/p  adjuvant radiation. Unfortunately she was having sigificant joint aches with the letrozole that was affecting her quality of life. She was switched to anastrozole, with continuation of these symptoms.  She is now on tamoxifen.  She is still having a lot of symptoms.  We discussed continuing 5 mg of tamoxifen.    Will reevaluate when I see her in April.       Liver lesion:  8mm on MRi and Ultrasound.  This was not amenable to biopsy.  Plan to repeat imaging in 3 months.    Bone health: DEXA scan previously showed osteopenia. Discussed calcium and vitamin D and weight bearing exercise. She is no longer on an AI, however we can get a repeat DEXA this year sometime.        30 minutes spent on the date of the encounter doing chart review, review of test results, interpretation of tests, patient visit, and documentation           Again, thank you for allowing me to participate in the care of your patient.      Sincerely,    Cristy Kenny MD    Electronically signed

## 2025-01-27 NOTE — PROGRESS NOTES
Jan 27, 2025    Medical Oncology Follow-up    Reason for visit: left breast cancer     HPI:  This is a 39 y.o. Female (older than her stated age likely in her 50s based on immigration and change of age), with T2N1a left breast cancer, ER positive. This was picked up by the patient. She noticed an indentation or dimpling of her breast several months ago. She brought it up to her physician when she was seen because of neck and arm pain after a Covid vaccine. She underwent a mammogram and ultrasound. A needle biopsy was done which shows an invasive ductal carcinoma. It is estrogen receptor positive 95%, progesterone receptor positive weakly 3% and HER-2 negative 0 by IHC.  R breast revealed two fibroadenomas.  L breast - 11x13 mm mass in the 1 oclock position 9 cm from the nipple.  Lymph nodes appeared normal.       She met with Dr Kent.  She underwent a lumpectomy with SNLB.      Oncotype Dx score returned at 17 giving her a risk of metastatic disease of 15% at 10 years.  No benefit from chemotherapy. I recommended adjuvant radiation and adjuvant endocrine therapy. Lab work confirmed postmenopausal (she told me her age is not accurate in the computer).     She received 5040 cGy in 28 treatments targeted to the left breast/chest wall and regional lymph nodes followed by an additional 1000 cGy in 5 fractions to the primary tumor bed utilizing electrons. Her radiation therapy was from 4/12/2021-6/2/2021 under the care of Dr. Iza Espinoza at [facility].     Interval History:   Xiao suffered terrible arthralgias from the AIs.   She then went on Tamoxifen.  She developed significant hot flashes.  She ultimately stopped tamoxifen.   She then restarted it on a lower dose.    She was seen in the Er summer 2024.  Incidental liver lesion was visualized.  This has been getting followed.  Close follow up with repeat imaging was recommended.  Biopsy could not be performed.    Lives at home with two daughters at the [employer].  Working as an  EEG technologist.      Of note, her birthday is [birth year], she is 51 yo.  She has been post menopausal for a few years.     ROS: 10 point ROS neg other than the symptoms noted above in the HPI.      Past Medical History:   Diagnosis Date    Abnormal glandular Papanicolaou smear of cervix     confusing info related to pap    Breast cancer (H)     History of colonic polyps 2021    Invasive ductal carcinoma of breast, left (H) 2021    Pain in joint, lower leg     Previous  delivery, antepartum condition or complication 2005     Current Outpatient Medications   Medication Sig Dispense Refill    B Complex Vitamins (B COMPLEX 50 PO) Take by mouth daily      BLACK COHOSH PO Take 60 mg by mouth 2 times daily      calcium carbonate 600 mg-vitamin D 400 units (CALTRATE) 600-400 MG-UNIT per tablet Take 1 tablet by mouth daily      EVENING PRIMROSE OIL PO Take 1,000 mg by mouth 2 times daily      fish oil-omega-3 fatty acids 500 MG capsule       Magnesium Oxide -Mg Supplement 500 MG TABS       multivitamin w/minerals (MULTI-VITAMIN) tablet Take 1 tablet by mouth daily      tamoxifen (NOLVADEX) 10 MG tablet Take 1 tablet (10 mg) by mouth daily 90 tablet 3    triamcinolone (KENALOG) 0.5 % external ointment Apply 1 g topically 2 times daily 60 g 3    UNABLE TO FIND CDG EstroDIM 60 - BID      vitamin D3 (CHOLECALCIFEROL) 50 mcg (2000 units) tablet Take 1 tablet by mouth daily       Current Facility-Administered Medications   Medication Dose Route Frequency Provider Last Rate Last Admin    sodium chloride (PF) 0.9% PF flush 3 mL  3 mL Intravenous q1 min prn            PHYSICAL EXAM:  /72 (BP Location: Right arm, Patient Position: Sitting, Cuff Size: Adult Regular)   Pulse 80   Temp 98.3  F (36.8  C) (Oral)   Resp 14   Wt 63 kg (138 lb 14.4 oz)   SpO2 99%   BMI 24.80 kg/m     Gen: Alert and oriented. No distress, pleasant  Lungs: CTAB, normal work of breathing on room air   Cards: RRR  GI: normal  bowel sounds, nontender to palpation   Breast: Left lower breast more firm inferiorly than right, healed left lumpectomy incision, otherwise appears symmetrical   Neuro: No focal neural deficits, moving all extremities, ambulating independently   Psych: Euthymic, appropriate mood and affect    Narrative & Impression   BILATERAL FULL FIELD DIGITAL SCREENING MAMMOGRAM WITH TOMOSYNTHESIS     Performed on: 1/22/24     Compared to: 01/05/2023, 12/15/2021, and 01/20/2021     Technique:  This study was evaluated with the assistance of Computer-Aided Detection.  Breast Tomosynthesis was used in interpretation.     Findings: The breasts have scattered areas of fibroglandular density.  There are post-surgical changes in the left breast.  There is no radiographic evidence of malignancy.                                                                    IMPRESSION: ACR BI-RADS Category 2: Benign     BREAST CANCER SCREENING RECOMMENDATION: Routine yearly mammography beginning at age 40 or as discussed with your provider.     Abd US:                                                                     IMPRESSION:   A 8 mm hypoechoic lesion within the left hepatic lobe likely  corresponds to the indeterminant observation seen on recent MRI.    Reviewed today's mammogram with radiology; this was benign.    A/P:  39 (49y/o) female with a pT2N1a left breast cancer s/p lumpectomy and SNLB.  The tumor is ER + essentially DC negative, her2 negative. She was previously on letrozole, but had significant hot flashes and joint pain and was switched to anastrozole (1/2022).  Started on tamoxifen (early 2023), stopped in Nov 2023 and now ready to try a smaller dose.     Left breast cancer: S/p lumpectomy + SLNB s/p adjuvant radiation. Unfortunately she was having sigificant joint aches with the letrozole that was affecting her quality of life. She was switched to anastrozole, with continuation of these symptoms.  She is now on tamoxifen.  She  is still having a lot of symptoms.  We discussed continuing 5 mg of tamoxifen.    Will reevaluate when I see her in April.       Liver lesion:  8mm on MRi and Ultrasound.  This was not amenable to biopsy.  Plan to repeat imaging in 3 months.    Bone health: DEXA scan previously showed osteopenia. Discussed calcium and vitamin D and weight bearing exercise. She is no longer on an AI, however we can get a repeat DEXA this year sometime.        30 minutes spent on the date of the encounter doing chart review, review of test results, interpretation of tests, patient visit, and documentation     Cristy Kenny MD

## 2025-01-27 NOTE — NURSING NOTE
"Oncology Rooming Note    January 27, 2025 10:35 AM   Xiao Beal is a 39 year old female who presents for:    Chief Complaint   Patient presents with    Oncology Clinic Visit    Breast Cancer     Invasive ductal carcinoma of breast, left     Initial Vitals: /72 (BP Location: Right arm, Patient Position: Sitting, Cuff Size: Adult Regular)   Pulse 80   Temp 98.3  F (36.8  C) (Oral)   Resp 14   Wt 63 kg (138 lb 14.4 oz)   SpO2 99%   BMI 24.80 kg/m   Estimated body mass index is 24.8 kg/m  as calculated from the following:    Height as of 1/29/24: 1.594 m (5' 2.75\").    Weight as of this encounter: 63 kg (138 lb 14.4 oz). Body surface area is 1.67 meters squared.  No Pain (0) Comment: Data Unavailable   No LMP recorded. Patient is perimenopausal.  Allergies reviewed: Yes  Medications reviewed: Yes    Medications: Medication refills not needed today.  Pharmacy name entered into [facility]:    [facility] [address]  [id number] [place], [place] - [address] [facility] [address]  [id number] - [place] - [address] [facility] [address]  [id number] - [place], [place] - [address] AT [facility] [address]    Frailty Screening:   Is the patient here for a new oncology consult visit in cancer care? 2. No      Clinical concerns: Pt reports no new concerns today.       Cata Dorado, EMT     "

## 2025-04-07 ENCOUNTER — ANCILLARY PROCEDURE (OUTPATIENT)
Dept: ULTRASOUND IMAGING | Facility: CLINIC | Age: 40
End: 2025-04-07
Attending: INTERNAL MEDICINE
Payer: COMMERCIAL

## 2025-04-07 ENCOUNTER — ONCOLOGY VISIT (OUTPATIENT)
Dept: ONCOLOGY | Facility: CLINIC | Age: 40
End: 2025-04-07
Attending: PHYSICIAN ASSISTANT
Payer: COMMERCIAL

## 2025-04-07 VITALS
BODY MASS INDEX: 25.23 KG/M2 | TEMPERATURE: 98.6 F | OXYGEN SATURATION: 99 % | SYSTOLIC BLOOD PRESSURE: 110 MMHG | WEIGHT: 141.3 LBS | HEART RATE: 95 BPM | DIASTOLIC BLOOD PRESSURE: 69 MMHG

## 2025-04-07 DIAGNOSIS — Z17.0 MALIGNANT NEOPLASM OF OVERLAPPING SITES OF LEFT BREAST IN FEMALE, ESTROGEN RECEPTOR POSITIVE (H): ICD-10-CM

## 2025-04-07 DIAGNOSIS — K76.9 LIVER LESION: Primary | ICD-10-CM

## 2025-04-07 DIAGNOSIS — C50.812 MALIGNANT NEOPLASM OF OVERLAPPING SITES OF LEFT BREAST IN FEMALE, ESTROGEN RECEPTOR POSITIVE (H): ICD-10-CM

## 2025-04-07 DIAGNOSIS — C50.912 INVASIVE DUCTAL CARCINOMA OF BREAST, LEFT (H): ICD-10-CM

## 2025-04-07 DIAGNOSIS — K76.9 LIVER LESION: ICD-10-CM

## 2025-04-07 PROCEDURE — 76700 US EXAM ABDOM COMPLETE: CPT | Performed by: RADIOLOGY

## 2025-04-07 PROCEDURE — 99214 OFFICE O/P EST MOD 30 MIN: CPT | Performed by: PHYSICIAN ASSISTANT

## 2025-04-07 ASSESSMENT — PAIN SCALES - GENERAL: PAINLEVEL_OUTOF10: NO PAIN (0)

## 2025-04-07 NOTE — LETTER
4/7/2025      Xiao Beal  [address]  [place] [address]      Dear Colleague,    Thank you for referring your patient, Xiao Beal, to the [facility]. Please see a copy of my visit note below.    Apr 7, 2025    Medical Oncology Follow-up    Reason for visit: left breast cancer     HPI:  This is a 39 y.o. Female (older than her stated age likely in her 50s based on immigration and change of age), with T2N1a left breast cancer, ER positive. This was picked up by the patient. She noticed an indentation or dimpling of her breast several months ago. She brought it up to her physician when she was seen because of neck and arm pain after a Covid vaccine. She underwent a mammogram and ultrasound. A needle biopsy was done which shows an invasive ductal carcinoma. It is estrogen receptor positive 95%, progesterone receptor positive weakly 3% and HER-2 negative 0 by IHC.  R breast revealed two fibroadenomas.  L breast - 11x13 mm mass in the 1 oclock position 9 cm from the nipple.  Lymph nodes appeared normal.       She met with Dr Kent.  She underwent a lumpectomy with SNLB.      Oncotype Dx score returned at 17 giving her a risk of metastatic disease of 15% at 9 years.  No benefit from chemotherapy. I recommended adjuvant radiation and adjuvant endocrine therapy. Lab work confirmed postmenopausal (she told me her age is not accurate in the computer).     She received 5040 cGy in 28 treatments targeted to the left breast/chest wall and regional lymph nodes followed by an additional 1000 cGy in 5 fractions to the primary tumor bed utilizing electrons. Her radiation therapy was from 4/12/2021-6/2/2021 under the care of Dr. Iza Espinoza at [facility].     Interval History:   Xiao suffered terrible arthralgias from the AIs.   She then went on Tamoxifen.  She developed significant hot flashes.  She ultimately stopped tamoxifen.   She then restarted it on a lower dose, currently at 5 mg and  tolerating this well.  Less hot flashes and her cognition is back on track.     She was seen in the Er summer 2024.  Incidental liver lesion was visualized.  This has been getting followed.  Close follow up with repeat imaging was recommended.  Biopsy could not be performed.    Lives at home with two daughters at the .  Working as an EEG technologist.      Of note, her birthday is [birth year], she is 53 yo.  She has been post menopausal for a few years.     ROS: 10 point ROS neg other than the symptoms noted above in the HPI.      Past Medical History:   Diagnosis Date     Abnormal glandular Papanicolaou smear of cervix     confusing info related to pap     Breast cancer (H)      History of colonic polyps 2021     Invasive ductal carcinoma of breast, left (H) 2021     Pain in joint, lower leg      Previous  delivery, antepartum condition or complication 2005     Current Outpatient Medications   Medication Sig Dispense Refill     B Complex Vitamins (B COMPLEX 50 PO) Take by mouth daily       calcium carbonate 600 mg-vitamin D 400 units (CALTRATE) 600-400 MG-UNIT per tablet Take 1 tablet by mouth daily       fish oil-omega-3 fatty acids 500 MG capsule        Magnesium Oxide -Mg Supplement 500 MG TABS        multivitamin w/minerals (MULTI-VITAMIN) tablet Take 1 tablet by mouth daily       tamoxifen (NOLVADEX) 10 MG tablet Take 0.5 tablets (5 mg) by mouth daily. 90 tablet 3     vitamin D3 (CHOLECALCIFEROL) 50 mcg (2000 units) tablet Take 1 tablet by mouth daily       UNABLE TO FIND CDG EstroDIM 60 - BID       Current Facility-Administered Medications   Medication Dose Route Frequency Provider Last Rate Last Admin     sodium chloride (PF) 0.9% PF flush 3 mL  3 mL Intravenous q1 min prn            PHYSICAL EXAM:  /69 (BP Location: Right arm, Patient Position: Sitting, Cuff Size: Adult Regular)   Pulse 95   Temp 98.6  F (37  C) (Oral)   Wt 64.1 kg (141 lb 4.8 oz)   SpO2 99%   BMI 25.23  kg/m     Gen: Alert and oriented. No distress, pleasant  Lungs: CTAB, normal work of breathing on room air   Cards: RRR  GI: normal bowel sounds, nontender to palpation   Breast: Left lower breast more firm inferiorly than right, healed left lumpectomy incision, otherwise appears symmetrical   Neuro: No focal neural deficits, moving all extremities, ambulating independently   Psych: Euthymic, appropriate mood and affect    1/27/25-- Mammo WNL     4/7/25  Abd US:    IMPRESSION:   Slightly increased size left hepatic lobe lesion and additional  hypoechoic lesions within the right hepatic lobe compared to prior  ultrasound and MRI. Although technically indeterminate, in the setting  of prior malignancy these are concerning for growing metastases.  Consider further evaluation with contrast-enhanced MRI.    A/P:  39 (51y/o) female with a pT2N1a left breast cancer s/p lumpectomy and SNLB.  The tumor is ER + essentially NJ negative, her2 negative. She was previously on letrozole, but had significant hot flashes and joint pain and was switched to anastrozole (1/2022).  Started on tamoxifen (early 2023), stopped in Nov 2023 and then resumed 5 mg which she is tolerating well.    Left breast cancer: S/p lumpectomy + SLNB s/p adjuvant radiation. Unfortunately she was having sigificant joint aches with the letrozole that was affecting her quality of life. She was switched to anastrozole, with continuation of these symptoms.  She is now on tamoxifen 5 mg and feels she can continue to tolerate this.  Hot flashes are improved and her cognition is better.  Willing to continue this.       Liver lesion:  8mm on MRi and Ultrasound.  This was not amenable to biopsy.  Too small to characterize on PET.  Discussed with radiology today and will obtain a liver MRI in 3 months     Bone health: DEXA scan previously showed osteopenia in 2024. Discussed calcium and vitamin D and weight bearing exercise.         30 minutes spent on the date of  the encounter doing chart review, review of test results, interpretation of tests, patient visit, and documentation     Carmen Lenz PA-C    Again, thank you for allowing me to participate in the care of your patient.        Sincerely,        Alicia Lenz PA-C    Electronically signed

## 2025-04-07 NOTE — NURSING NOTE
"Oncology Rooming Note    April 7, 2025 10:50 AM   Xiao Beal is a 40 year old female who presents for:    Chief Complaint   Patient presents with    Oncology Clinic Visit     Malignant neoplasm of overlapping sites of left breast in female, estrogen receptor positive     Initial Vitals: /69 (BP Location: Right arm, Patient Position: Sitting, Cuff Size: Adult Regular)   Pulse 95   Temp 98.6  F (37  C) (Oral)   Wt 64.1 kg (141 lb 4.8 oz)   SpO2 99%   BMI 25.23 kg/m   Estimated body mass index is 25.23 kg/m  as calculated from the following:    Height as of 1/29/24: 1.594 m (5' 2.75\").    Weight as of this encounter: 64.1 kg (141 lb 4.8 oz). Body surface area is 1.68 meters squared.  No Pain (0) Comment: Data Unavailable   No LMP recorded. Patient is perimenopausal.  Allergies reviewed: Yes  Medications reviewed: Yes    Medications: Medication refills not needed today.  Pharmacy name entered into [facility]:    [facility] [place], [place] - [address]  [facility][id number] [place], [place] - [address] AT [facility] [address]  [id number] - [place] - [address] [facility] [address]  [id number] - [place], [place] - [address] [facility] [address]    Frailty Screening:   Is the patient here for a new oncology consult visit in cancer care? 2. No    PHQ9:  Did this patient require a PHQ9?: NO       Clinical concerns: none       Eric Griffiths              "

## 2025-04-07 NOTE — PROGRESS NOTES
Apr 7, 2025    Medical Oncology Follow-up    Reason for visit: left breast cancer     HPI:  This is a 39 y.o. Female (older than her stated age likely in her 50s based on immigration and change of age), with T2N1a left breast cancer, ER positive. This was picked up by the patient. She noticed an indentation or dimpling of her breast several months ago. She brought it up to her physician when she was seen because of neck and arm pain after a Covid vaccine. She underwent a mammogram and ultrasound. A needle biopsy was done which shows an invasive ductal carcinoma. It is estrogen receptor positive 95%, progesterone receptor positive weakly 3% and HER-2 negative 0 by IHC.  R breast revealed two fibroadenomas.  L breast - 11x13 mm mass in the 1 oclock position 9 cm from the nipple.  Lymph nodes appeared normal.       She met with Dr Kent.  She underwent a lumpectomy with SNLB.      Oncotype Dx score returned at 17 giving her a risk of metastatic disease of 15% at 9 years.  No benefit from chemotherapy. I recommended adjuvant radiation and adjuvant endocrine therapy. Lab work confirmed postmenopausal (she told me her age is not accurate in the computer).     She received 5040 cGy in 28 treatments targeted to the left breast/chest wall and regional lymph nodes followed by an additional 1000 cGy in 5 fractions to the primary tumor bed utilizing electrons. Her radiation therapy was from 4/12/2021-6/2/2021 under the care of Dr. Iza Espinoza at [facility].     Interval History:   Xiao suffered terrible arthralgias from the AIs.   She then went on Tamoxifen.  She developed significant hot flashes.  She ultimately stopped tamoxifen.   She then restarted it on a lower dose, currently at 5 mg and tolerating this well.  Less hot flashes and her cognition is back on track.     She was seen in the Er summer 2024.  Incidental liver lesion was visualized.  This has been getting followed.  Close follow up with repeat imaging was  recommended.  Biopsy could not be performed.    Lives at home with two daughters at the .  Working as an EEG technologist.      Of note, her birthday is [birth year], she is 51 yo.  She has been post menopausal for a few years.     ROS: 10 point ROS neg other than the symptoms noted above in the HPI.      Past Medical History:   Diagnosis Date    Abnormal glandular Papanicolaou smear of cervix     confusing info related to pap    Breast cancer (H)     History of colonic polyps 2021    Invasive ductal carcinoma of breast, left (H) 2021    Pain in joint, lower leg     Previous  delivery, antepartum condition or complication 2005     Current Outpatient Medications   Medication Sig Dispense Refill    B Complex Vitamins (B COMPLEX 50 PO) Take by mouth daily      calcium carbonate 600 mg-vitamin D 400 units (CALTRATE) 600-400 MG-UNIT per tablet Take 1 tablet by mouth daily      fish oil-omega-3 fatty acids 500 MG capsule       Magnesium Oxide -Mg Supplement 500 MG TABS       multivitamin w/minerals (MULTI-VITAMIN) tablet Take 1 tablet by mouth daily      tamoxifen (NOLVADEX) 10 MG tablet Take 0.5 tablets (5 mg) by mouth daily. 90 tablet 3    vitamin D3 (CHOLECALCIFEROL) 50 mcg (2000 units) tablet Take 1 tablet by mouth daily      UNABLE TO FIND CDG EstroDIM 60 - BID       Current Facility-Administered Medications   Medication Dose Route Frequency Provider Last Rate Last Admin    sodium chloride (PF) 0.9% PF flush 3 mL  3 mL Intravenous q1 min prn            PHYSICAL EXAM:  /69 (BP Location: Right arm, Patient Position: Sitting, Cuff Size: Adult Regular)   Pulse 95   Temp 98.6  F (37  C) (Oral)   Wt 64.1 kg (141 lb 4.8 oz)   SpO2 99%   BMI 25.23 kg/m     Gen: Alert and oriented. No distress, pleasant  Lungs: CTAB, normal work of breathing on room air   Cards: RRR  GI: normal bowel sounds, nontender to palpation   Breast: Left lower breast more firm inferiorly than right, healed left  lumpectomy incision, otherwise appears symmetrical   Neuro: No focal neural deficits, moving all extremities, ambulating independently   Psych: Euthymic, appropriate mood and affect    1/27/25-- Mammo WNL     4/7/25  Abd US:    IMPRESSION:   Slightly increased size left hepatic lobe lesion and additional  hypoechoic lesions within the right hepatic lobe compared to prior  ultrasound and MRI. Although technically indeterminate, in the setting  of prior malignancy these are concerning for growing metastases.  Consider further evaluation with contrast-enhanced MRI.    A/P:  39 (53y/o) female with a pT2N1a left breast cancer s/p lumpectomy and SNLB.  The tumor is ER + essentially [place] negative, her2 negative. She was previously on letrozole, but had significant hot flashes and joint pain and was switched to anastrozole (1/2022).  Started on tamoxifen (early 2023), stopped in Nov 2023 and then resumed 5 mg which she is tolerating well.    Left breast cancer: S/p lumpectomy + SLNB s/p adjuvant radiation. Unfortunately she was having sigificant joint aches with the letrozole that was affecting her quality of life. She was switched to anastrozole, with continuation of these symptoms.  She is now on tamoxifen 5 mg and feels she can continue to tolerate this.  Hot flashes are improved and her cognition is better.  Willing to continue this.       Liver lesion:  8mm on MRi and Ultrasound.  This was not amenable to biopsy.  Too small to characterize on PET.  Discussed with radiology today and will obtain a liver MRI in 3 months     Bone health: DEXA scan previously showed osteopenia in 2024. Discussed calcium and vitamin D and weight bearing exercise.         30 minutes spent on the date of the encounter doing chart review, review of test results, interpretation of tests, patient visit, and documentation     Carmen Lenz PA-C

## 2025-07-21 ENCOUNTER — ONCOLOGY VISIT (OUTPATIENT)
Dept: ONCOLOGY | Facility: CLINIC | Age: 40
End: 2025-07-21
Attending: INTERNAL MEDICINE
Payer: COMMERCIAL

## 2025-07-21 ENCOUNTER — ANCILLARY PROCEDURE (OUTPATIENT)
Dept: MRI IMAGING | Facility: CLINIC | Age: 40
End: 2025-07-21
Attending: PHYSICIAN ASSISTANT
Payer: COMMERCIAL

## 2025-07-21 VITALS
HEIGHT: 65 IN | WEIGHT: 142.6 LBS | RESPIRATION RATE: 16 BRPM | DIASTOLIC BLOOD PRESSURE: 66 MMHG | HEART RATE: 71 BPM | SYSTOLIC BLOOD PRESSURE: 106 MMHG | TEMPERATURE: 98.3 F | BODY MASS INDEX: 23.76 KG/M2 | OXYGEN SATURATION: 99 %

## 2025-07-21 DIAGNOSIS — K76.9 LIVER LESION: ICD-10-CM

## 2025-07-21 DIAGNOSIS — Z17.0 MALIGNANT NEOPLASM OF OVERLAPPING SITES OF LEFT BREAST IN FEMALE, ESTROGEN RECEPTOR POSITIVE (H): Primary | ICD-10-CM

## 2025-07-21 DIAGNOSIS — C50.812 MALIGNANT NEOPLASM OF OVERLAPPING SITES OF LEFT BREAST IN FEMALE, ESTROGEN RECEPTOR POSITIVE (H): Primary | ICD-10-CM

## 2025-07-21 DIAGNOSIS — C50.912 INVASIVE DUCTAL CARCINOMA OF BREAST, LEFT (H): ICD-10-CM

## 2025-07-21 PROCEDURE — 74183 MRI ABD W/O CNTR FLWD CNTR: CPT | Performed by: STUDENT IN AN ORGANIZED HEALTH CARE EDUCATION/TRAINING PROGRAM

## 2025-07-21 PROCEDURE — A9581 GADOXETATE DISODIUM INJ: HCPCS | Performed by: STUDENT IN AN ORGANIZED HEALTH CARE EDUCATION/TRAINING PROGRAM

## 2025-07-21 PROCEDURE — 99213 OFFICE O/P EST LOW 20 MIN: CPT | Performed by: INTERNAL MEDICINE

## 2025-07-21 NOTE — DISCHARGE INSTRUCTIONS

## 2025-07-21 NOTE — PROGRESS NOTES
Jul 21, 2025    Medical Oncology Follow-up    Reason for visit: left breast cancer     HPI:  This is a 40 y.o. Female (older than her stated age likely in her 50s based on immigration and change of age), with T2N1a left breast cancer, ER positive. This was picked up by the patient. She noticed an indentation or dimpling of her breast several months ago. She brought it up to her physician when she was seen because of neck and arm pain after a Covid vaccine. She underwent a mammogram and ultrasound. A needle biopsy was done which shows an invasive ductal carcinoma. It is estrogen receptor positive 95%, progesterone receptor positive weakly 3% and HER-2 negative 0 by IHC.  R breast revealed two fibroadenomas.  L breast - 11x13 mm mass in the 1 oclock position 9 cm from the nipple.  Lymph nodes appeared normal.       She met with Dr Kent.  She underwent a lumpectomy with SNLB.      Oncotype Dx score returned at 17 giving her a risk of metastatic disease of 15% at 10 years.  No benefit from chemotherapy. I recommended adjuvant radiation and adjuvant endocrine therapy. Lab work confirmed postmenopausal (she told me her age is not accurate in the computer).     She received 5040 cGy in 28 treatments targeted to the left breast/chest wall and regional lymph nodes followed by an additional 1000 cGy in 5 fractions to the primary tumor bed utilizing electrons. Her radiation therapy was from 4/12/2021-6/2/2021 under the care of Dr. Iza Espinoza at [facility].     Interval History:   Xiao suffered terrible arthralgias from the AIs.   She then went on Tamoxifen.  She developed significant hot flashes.  She ultimately stopped tamoxifen.   She then restarted it on a lower dose.  She did reasonably well on the low dose of tamoxifen but finds she still gets hot flashes, and she still has low mood.  She stopped the tamoxifen 3 weeks ago, and wants to discuss next steps.      She was seen in the ER summer 2024.  Incidental liver lesion  "was visualized.  This has been getting followed.  Close follow up with repeat imaging was recommended.  Biopsy could not be performed.    Lives at home with two daughters at the .  Working as an EEG technologist.      Of note, her birthday is [birth year], she is 53 yo.  She has been post menopausal for a few years.     ROS: 10 point ROS neg other than the symptoms noted above in the HPI.      Past Medical History:   Diagnosis Date    Abnormal glandular Papanicolaou smear of cervix     confusing info related to pap    Breast cancer (H)     History of colonic polyps 2021    Invasive ductal carcinoma of breast, left (H) 2021    Pain in joint, lower leg     Previous  delivery, antepartum condition or complication 2005     Current Outpatient Medications   Medication Sig Dispense Refill    calcium carbonate 600 mg-vitamin D 400 units (CALTRATE) 600-400 MG-UNIT per tablet Take 1 tablet by mouth daily      Magnesium Oxide -Mg Supplement 500 MG TABS       multivitamin w/minerals (MULTI-VITAMIN) tablet Take 1 tablet by mouth daily      vitamin D3 (CHOLECALCIFEROL) 50 mcg (2000 units) tablet Take 1 tablet by mouth daily      B Complex Vitamins (B COMPLEX 50 PO) Take by mouth daily (Patient not taking: Reported on 2025)      fish oil-omega-3 fatty acids 500 MG capsule  (Patient not taking: Reported on 2025)      tamoxifen (NOLVADEX) 10 MG tablet Take 0.5 tablets (5 mg) by mouth daily. (Patient not taking: Reported on 2025) 90 tablet 3    UNABLE TO FIND CDG EstroDIM 60 - BID       Current Facility-Administered Medications   Medication Dose Route Frequency Provider Last Rate Last Admin    sodium chloride (PF) 0.9% PF flush 3 mL  3 mL Intravenous q1 min prn            PHYSICAL EXAM:  /66 (BP Location: Right arm, Patient Position: Sitting, Cuff Size: Adult Regular)   Pulse 71   Temp 98.3  F (36.8  C) (Oral)   Resp 16   Ht 1.64 m (5' 4.57\")   Wt 64.7 kg (142 lb 9.6 oz)   SpO2 99%   " BMI 24.05 kg/m     Gen: Alert and oriented. No distress, pleasant  Lungs: CTAB, normal work of breathing on room air   Cards: RRR  GI: normal bowel sounds, nontender to palpation.  I do not appreciate the liver edge  Breast: not performed today.  Neuro: No focal neural deficits, moving all extremities, ambulating independently   Psych: Euthymic, appropriate mood and affect      Liver MRI - reviewed with radiology.  Final report pending.  Overall liver lesion is stable.  No new lesions.    A/P:  40 (51y/o) female with a pT2N1a left breast cancer s/p lumpectomy and SNLB.  The tumor is ER + essentially HI negative, her2 negative. She was previously on letrozole, but had significant hot flashes and joint pain and was switched to anastrozole (1/2022).  Started on tamoxifen (early 2023), stopped in Nov 2023 and is now on 5 mg daily.    Left breast cancer: S/p lumpectomy + SLNB s/p adjuvant radiation. Unfortunately she was having sigificant joint aches with the letrozole that was affecting her quality of life. She was switched to anastrozole, with continuation of these symptoms.  She is now on tamoxifen.  She is still having a lot of symptoms and recently stopped this medication.  We discussed continuing 5 mg of tamoxifen.  I discussed that with her MRI, it is stable.  I would recommend however that we obtain PET/CT scan and repeat labs to ensure stability.  She agrees.      Plan:  tamoxifen 5 mg daily.  PET/CT and labs.       Liver lesion:  8mm on MRi and Ultrasound.  This was not amenable to biopsy.  In follow up today, she had a liver MRI.  I reviewed this with radiology.  This is stable.  PET scan    Bone health: DEXA scan previously showed osteopenia. Discussed calcium and vitamin D and weight bearing exercise. She is no longer on an AI, however we can get a repeat DEXA this year sometime.        30 minutes spent on the date of the encounter doing chart review, review of test results, interpretation of tests, patient  visit, and documentation     Cristy Kenny MD

## 2025-07-21 NOTE — LETTER
7/21/2025      Xiao Beal  [address]  [place] [address]      Dear Colleague,    Thank you for referring your patient, Xiao Beal, to the [facility]. Please see a copy of my visit note below.    Jul 21, 2025    Medical Oncology Follow-up    Reason for visit: left breast cancer     HPI:  This is a 40 y.o. Female (older than her stated age likely in her 50s based on immigration and change of age), with T2N1a left breast cancer, ER positive. This was picked up by the patient. She noticed an indentation or dimpling of her breast several months ago. She brought it up to her physician when she was seen because of neck and arm pain after a Covid vaccine. She underwent a mammogram and ultrasound. A needle biopsy was done which shows an invasive ductal carcinoma. It is estrogen receptor positive 95%, progesterone receptor positive weakly 3% and HER-2 negative 0 by IHC.  R breast revealed two fibroadenomas.  L breast - 11x13 mm mass in the 1 oclock position 9 cm from the nipple.  Lymph nodes appeared normal.       She met with Dr Kent.  She underwent a lumpectomy with SNLB.      Oncotype Dx score returned at 17 giving her a risk of metastatic disease of 15% at 10 years.  No benefit from chemotherapy. I recommended adjuvant radiation and adjuvant endocrine therapy. Lab work confirmed postmenopausal (she told me her age is not accurate in the computer).     She received 5040 cGy in 28 treatments targeted to the left breast/chest wall and regional lymph nodes followed by an additional 1000 cGy in 5 fractions to the primary tumor bed utilizing electrons. Her radiation therapy was from 4/12/2021-6/2/2021 under the care of Dr. Iza Espinoza at [facility].     Interval History:   Xiao suffered terrible arthralgias from the AIs.   She then went on Tamoxifen.  She developed significant hot flashes.  She ultimately stopped tamoxifen.   She then restarted it on a lower dose.  She did reasonably well  on the low dose of tamoxifen but finds she still gets hot flashes, and she still has low mood.  She stopped the tamoxifen 3 weeks ago, and wants to discuss next steps.      She was seen in the ER summer 2024.  Incidental liver lesion was visualized.  This has been getting followed.  Close follow up with repeat imaging was recommended.  Biopsy could not be performed.    Lives at home with two daughters at the .  Working as an EEG technologist.      Of note, her birthday is [birth year], she is 51 yo.  She has been post menopausal for a few years.     ROS: 10 point ROS neg other than the symptoms noted above in the HPI.      Past Medical History:   Diagnosis Date     Abnormal glandular Papanicolaou smear of cervix     confusing info related to pap     Breast cancer (H)      History of colonic polyps 2021     Invasive ductal carcinoma of breast, left (H) 2021     Pain in joint, lower leg      Previous  delivery, antepartum condition or complication 2005     Current Outpatient Medications   Medication Sig Dispense Refill     calcium carbonate 600 mg-vitamin D 400 units (CALTRATE) 600-400 MG-UNIT per tablet Take 1 tablet by mouth daily       Magnesium Oxide -Mg Supplement 500 MG TABS        multivitamin w/minerals (MULTI-VITAMIN) tablet Take 1 tablet by mouth daily       vitamin D3 (CHOLECALCIFEROL) 50 mcg (2000 units) tablet Take 1 tablet by mouth daily       B Complex Vitamins (B COMPLEX 50 PO) Take by mouth daily (Patient not taking: Reported on 2025)       fish oil-omega-3 fatty acids 500 MG capsule  (Patient not taking: Reported on 2025)       tamoxifen (NOLVADEX) 10 MG tablet Take 0.5 tablets (5 mg) by mouth daily. (Patient not taking: Reported on 2025) 90 tablet 3     UNABLE TO FIND CDG EstroDIM 60 - BID       Current Facility-Administered Medications   Medication Dose Route Frequency Provider Last Rate Last Admin     sodium chloride (PF) 0.9% PF flush 3 mL  3 mL Intravenous q1  "min prn            PHYSICAL EXAM:  /66 (BP Location: Right arm, Patient Position: Sitting, Cuff Size: Adult Regular)   Pulse 71   Temp 98.3  F (36.8  C) (Oral)   Resp 16   Ht 1.64 m (5' 4.57\")   Wt 64.7 kg (142 lb 9.6 oz)   SpO2 99%   BMI 24.05 kg/m     Gen: Alert and oriented. No distress, pleasant  Lungs: CTAB, normal work of breathing on room air   Cards: RRR  GI: normal bowel sounds, nontender to palpation.  I do not appreciate the liver edge  Breast: not performed today.  Neuro: No focal neural deficits, moving all extremities, ambulating independently   Psych: Euthymic, appropriate mood and affect      Liver MRI - reviewed with radiology.  Final report pending.  Overall liver lesion is stable.  No new lesions.    A/P:  40 (53y/o) female with a pT2N1a left breast cancer s/p lumpectomy and SNLB.  The tumor is ER + essentially CA negative, her2 negative. She was previously on letrozole, but had significant hot flashes and joint pain and was switched to anastrozole (1/2022).  Started on tamoxifen (early 2023), stopped in Nov 2023 and is now on 5 mg daily.    Left breast cancer: S/p lumpectomy + SLNB s/p adjuvant radiation. Unfortunately she was having sigificant joint aches with the letrozole that was affecting her quality of life. She was switched to anastrozole, with continuation of these symptoms.  She is now on tamoxifen.  She is still having a lot of symptoms and recently stopped this medication.  We discussed continuing 5 mg of tamoxifen.  I discussed that with her MRI, it is stable.  I would recommend however that we obtain PET/CT scan and repeat labs to ensure stability.  She agrees.      Plan:  tamoxifen 5 mg daily.  PET/CT and labs.       Liver lesion:  8mm on MRi and Ultrasound.  This was not amenable to biopsy.  In follow up today, she had a liver MRI.  I reviewed this with radiology.  This is stable.  PET scan    Bone health: DEXA scan previously showed osteopenia. Discussed calcium and " vitamin D and weight bearing exercise. She is no longer on an AI, however we can get a repeat DEXA this year sometime.        30 minutes spent on the date of the encounter doing chart review, review of test results, interpretation of tests, patient visit, and documentation     Cristy Kenny MD       Again, thank you for allowing me to participate in the care of your patient.        Sincerely,        Cristy Kenny MD    Electronically signed

## 2025-07-21 NOTE — NURSING NOTE
"Oncology Rooming Note    July 21, 2025 1:57 PM   Xiao Beal is a 40 year old female who presents for:    Chief Complaint   Patient presents with    Oncology Clinic Visit     RTN Invasive ductal carcinoma of left breast      Initial Vitals: /66 (BP Location: Right arm, Patient Position: Sitting, Cuff Size: Adult Regular)   Pulse 71   Temp 98.3  F (36.8  C) (Oral)   Resp 16   Ht 1.64 m (5' 4.57\")   Wt 64.7 kg (142 lb 9.6 oz)   SpO2 99%   BMI 24.05 kg/m   Estimated body mass index is 24.05 kg/m  as calculated from the following:    Height as of this encounter: 1.64 m (5' 4.57\").    Weight as of this encounter: 64.7 kg (142 lb 9.6 oz). Body surface area is 1.72 meters squared.  Data Unavailable Comment: Data Unavailable   No LMP recorded. Patient is perimenopausal.  Allergies reviewed: Yes  Medications reviewed: Yes    Medications: Medication refills not needed today.  Pharmacy name entered into [facility]:    [id number] [place], [place] - [address]  AT [facility] [address]  [id number] [place] [address] [facility] [address] [place]  [id number] - [place], [place] - [address] AT [facility] [address]    PHQ9:  Did this patient require a PHQ9?: No      Clinical concerns: None      Walker Dunbar             "

## 2025-07-28 ENCOUNTER — PATIENT OUTREACH (OUTPATIENT)
Dept: CARE COORDINATION | Facility: CLINIC | Age: 40
End: 2025-07-28
Payer: COMMERCIAL

## 2025-08-06 ENCOUNTER — ANCILLARY PROCEDURE (OUTPATIENT)
Dept: MAMMOGRAPHY | Facility: CLINIC | Age: 40
End: 2025-08-06
Attending: FAMILY MEDICINE
Payer: COMMERCIAL

## 2025-08-06 ENCOUNTER — HOSPITAL ENCOUNTER (OUTPATIENT)
Dept: PET IMAGING | Facility: CLINIC | Age: 40
Discharge: HOME | End: 2025-08-06
Attending: INTERNAL MEDICINE
Payer: COMMERCIAL

## 2025-08-06 DIAGNOSIS — N63.22 MASS OF UPPER INNER QUADRANT OF LEFT BREAST: ICD-10-CM

## 2025-08-06 DIAGNOSIS — K76.9 LIVER LESION: ICD-10-CM

## 2025-08-06 DIAGNOSIS — C50.812 MALIGNANT NEOPLASM OF OVERLAPPING SITES OF LEFT BREAST IN FEMALE, ESTROGEN RECEPTOR POSITIVE (H): ICD-10-CM

## 2025-08-06 DIAGNOSIS — Z17.0 MALIGNANT NEOPLASM OF OVERLAPPING SITES OF LEFT BREAST IN FEMALE, ESTROGEN RECEPTOR POSITIVE (H): ICD-10-CM

## 2025-08-06 LAB
CREAT BLD-MCNC: 0.8 MG/DL (ref 0.5–1)
EGFRCR SERPLBLD CKD-EPI 2021: >60 ML/MIN/1.73M2

## 2025-08-06 PROCEDURE — 74177 CT ABD & PELVIS W/CONTRAST: CPT

## 2025-08-06 PROCEDURE — 82565 ASSAY OF CREATININE: CPT

## 2025-08-06 PROCEDURE — 76642 ULTRASOUND BREAST LIMITED: CPT | Mod: LT | Performed by: STUDENT IN AN ORGANIZED HEALTH CARE EDUCATION/TRAINING PROGRAM

## 2025-08-06 PROCEDURE — 343N000001 HC RX 343 MED OP 636: Performed by: INTERNAL MEDICINE

## 2025-08-06 PROCEDURE — 250N000011 HC RX IP 250 OP 636: Performed by: INTERNAL MEDICINE

## 2025-08-06 PROCEDURE — G0279 TOMOSYNTHESIS, MAMMO: HCPCS | Performed by: STUDENT IN AN ORGANIZED HEALTH CARE EDUCATION/TRAINING PROGRAM

## 2025-08-06 PROCEDURE — 78815 PET IMAGE W/CT SKULL-THIGH: CPT | Mod: PI

## 2025-08-06 PROCEDURE — 77065 DX MAMMO INCL CAD UNI: CPT | Mod: LT | Performed by: STUDENT IN AN ORGANIZED HEALTH CARE EDUCATION/TRAINING PROGRAM

## 2025-08-06 PROCEDURE — A9552 F18 FDG: HCPCS | Performed by: INTERNAL MEDICINE

## 2025-08-06 RX ORDER — FLUDEOXYGLUCOSE F 18 200 MCI/ML
10-18 INJECTION, SOLUTION INTRAVENOUS ONCE
Status: COMPLETED | OUTPATIENT
Start: 2025-08-06 | End: 2025-08-06

## 2025-08-06 RX ORDER — IOPAMIDOL 755 MG/ML
10-135 INJECTION, SOLUTION INTRAVASCULAR ONCE
Status: COMPLETED | OUTPATIENT
Start: 2025-08-06 | End: 2025-08-06

## 2025-08-06 RX ADMIN — FLUDEOXYGLUCOSE F 18 10.2 MILLICURIE: 200 INJECTION, SOLUTION INTRAVENOUS at 09:30

## 2025-08-06 RX ADMIN — IOPAMIDOL 86 ML: 755 INJECTION, SOLUTION INTRAVENOUS at 10:24

## 2025-08-20 ENCOUNTER — TELEPHONE (OUTPATIENT)
Dept: ONCOLOGY | Facility: CLINIC | Age: 40
End: 2025-08-20
Payer: COMMERCIAL

## 2025-08-20 DIAGNOSIS — C50.812 MALIGNANT NEOPLASM OF OVERLAPPING SITES OF LEFT BREAST IN FEMALE, ESTROGEN RECEPTOR POSITIVE (H): ICD-10-CM

## 2025-08-20 DIAGNOSIS — R91.8 PULMONARY NODULES: Primary | ICD-10-CM

## 2025-08-20 DIAGNOSIS — K76.89 LIVER NODULE: ICD-10-CM

## 2025-08-20 DIAGNOSIS — Z17.0 MALIGNANT NEOPLASM OF OVERLAPPING SITES OF LEFT BREAST IN FEMALE, ESTROGEN RECEPTOR POSITIVE (H): ICD-10-CM
